# Patient Record
Sex: FEMALE | Race: OTHER | NOT HISPANIC OR LATINO | Employment: UNEMPLOYED | URBAN - METROPOLITAN AREA
[De-identification: names, ages, dates, MRNs, and addresses within clinical notes are randomized per-mention and may not be internally consistent; named-entity substitution may affect disease eponyms.]

---

## 2017-03-15 ENCOUNTER — TELEPHONE (OUTPATIENT)
Dept: MEDICAL GROUP | Age: 32
End: 2017-03-15

## 2017-03-16 NOTE — TELEPHONE ENCOUNTER
Called Milli Santizo and left a message to return my call regarding his/her upcoming NP appointment with Sandrine Pan M.D..   If patient returns the call please transfer them to extension: [phone]

## 2017-03-23 ENCOUNTER — OFFICE VISIT (OUTPATIENT)
Dept: MEDICAL GROUP | Age: 32
End: 2017-03-23
Payer: COMMERCIAL

## 2017-03-23 VITALS
HEIGHT: 61 IN | OXYGEN SATURATION: 96 % | DIASTOLIC BLOOD PRESSURE: 64 MMHG | HEART RATE: 78 BPM | BODY MASS INDEX: 27.68 KG/M2 | WEIGHT: 146.6 LBS | SYSTOLIC BLOOD PRESSURE: 110 MMHG | TEMPERATURE: 98.1 F

## 2017-03-23 DIAGNOSIS — Z13.220 SCREENING FOR LIPID DISORDERS: ICD-10-CM

## 2017-03-23 DIAGNOSIS — F52.5 PSYCHOLOGIC VAGINISMUS: ICD-10-CM

## 2017-03-23 DIAGNOSIS — Z30.09 FAMILY PLANNING: ICD-10-CM

## 2017-03-23 DIAGNOSIS — N92.6 IRREGULAR MENSTRUAL CYCLE: ICD-10-CM

## 2017-03-23 PROCEDURE — 99204 OFFICE O/P NEW MOD 45 MIN: CPT | Performed by: INTERNAL MEDICINE

## 2017-03-23 ASSESSMENT — PATIENT HEALTH QUESTIONNAIRE - PHQ9: CLINICAL INTERPRETATION OF PHQ2 SCORE: 0

## 2017-03-23 NOTE — MR AVS SNAPSHOT
"        Milli Santizo   3/23/2017 4:00 PM   Office Visit   MRN: [id number]    Department:  [address] [place] [address]   Dept Phone:  [phone]    Description:  Female : 1985   Provider:  Sandrine Pan M.D.           Reason for Visit     Establish Care     Dysmenorrhea missed period in February and returned on 3/7/17    Difficulty Sleeping not getting enough hours of sleep and experiencing daytime sleepiness    Other family planning      Allergies as of 3/23/2017     No Known Allergies      You were diagnosed with     Psychologic vaginismus   [069034]       Irregular menstrual cycle   [626.4.ICD-9-CM]       Family planning   [701791]       Screening for lipid disorders   [9674160]         Vital Signs     Blood Pressure Pulse Temperature Height Weight Body Mass Index    110/64 mmHg 78 36.7 °C (98.1 °F) 1.549 m (5' 0.98\") 66.497 kg (146 lb 9.6 oz) 27.71 kg/m2    Oxygen Saturation Last Menstrual Period Breastfeeding? Smoking Status          96% 2017 No Never Smoker         Basic Information     Date Of Birth Sex Race Ethnicity Preferred Language    1985 Female Other Non- English      Your appointments     May 01, 2017  2:30 PM   New Patient with Arvind Paredes M.D.   [facility])    [address]  [facility] [id number] [phone]              Problem List              ICD-10-CM Priority Class Noted - Resolved    Positive PPD R76.11   Unknown - Present    Allergic rhinitis    2013 - Present    Family planning Z30.09   2013 - Present    Psychologic vaginismus F52.5   3/23/2017 - Present    Irregular menstrual cycle N92.6   3/23/2017 - Present      Health Maintenance        Date Due Completion Dates    IMM DTaP/Tdap/Td Vaccine (1 - Tdap) 2006 ---    PAP SMEAR 2008 ---            Current Immunizations     HPV Quadrivalent Vaccine (GARDASIL) 2013  2:45 PM    Influenza TIV (IM) 10/21/2013, 2013   " Influenza Vaccine Quad Inj (Preserved) 10/18/2016    MMR Vaccine 12/13/2012, 11/13/2012  3:15 PM    Tuberculin Skin Test 4/1/2011      Below and/or attached are the medications your provider expects you to take. Review all of your home medications and newly ordered medications with your provider and/or pharmacist. Follow medication instructions as directed by your provider and/or pharmacist. Please keep your medication list with you and share with your provider. Update the information when medications are discontinued, doses are changed, or new medications (including over-the-counter products) are added; and carry medication information at all times in the event of emergency situations     Allergies:  No Known Allergies          Medications  Valid as of: March 23, 2017 -  5:46 PM    Generic Name Brand Name Tablet Size Instructions for use    .                 Medicines prescribed today were sent to:     [facility][id number] - [place], [place] - [address]    [address] [place] [address]    Phone: [phone] Fax: [phone]    Open 24 Hours?: No      Medication refill instructions:       If your prescription bottle indicates you have medication refills left, it is not necessary to call your provider’s office. Please contact your pharmacy and they will refill your medication.    If your prescription bottle indicates you do not have any refills left, you may request refills at any time through one of the following ways: The online [facility] system (except Urgent Care), by calling your provider’s office, or by asking your pharmacy to contact your provider’s office with a refill request. Medication refills are processed only during regular business hours and may not be available until the next business day. Your provider may request additional information or to have a follow-up visit with you prior to refilling your medication.   *Please Note: Medication refills are assigned a new Rx number when refilled  electronically. Your pharmacy may indicate that no refills were authorized even though a new prescription for the same medication is available at the pharmacy. Please request the medicine by name with the pharmacy before contacting your provider for a refill.        Your To Do List     Future Labs/Procedures Complete By Expires    CBC WITH DIFFERENTIAL  As directed 3/24/2018    COMP METABOLIC PANEL  As directed 3/24/2018    FREE THYROXINE  As directed 3/24/2018    LIPID PROFILE  As directed 3/24/2018    TSH  As directed 3/24/2018      Referral     A referral request has been sent to our patient care coordination department. Please allow 3-5 business days for us to process this request and contact you either by phone or mail. If you do not hear from us by the 5th business day, please call us at [phone].           [employer] Access Code: Activation code not generated  Current [employer] Status: Active

## 2017-03-23 NOTE — Clinical Note
[facility]  Sandrine Pan M.D.  [address]  [place] [address]  Fax: [phone]   Authorization for Release/Disclosure of   Protected Health Information   Name: AUNG SANTIZO : 1985 SSN: [id number]   [place] [place] [place] [address] Phone:    [phone] (home)    I authorize the entity listed below to release/disclose the PHI below to:   [facility], [facility]/Sandrine Pan M.D. and Sandrine Pan M.D.   Provider or Entity Name:  Dr. Patito Bustamante   Address   [facility], [facility], [facility]   Phone:      Fax:     Reason for request: continuity of care   Information to be released:    [  ] LAST COLONOSCOPY,  including any PATH REPORT and follow-up  [  ] LAST FIT/COLOGUARD RESULT [  ] LAST DEXA  [  ] LAST MAMMOGRAM  [  ] LAST PAP  [  ] LAST LABS [  ] RETINA EXAM REPORT  [  ] IMMUNIZATION RECORDS  [ XXXX ] Release all info      [  ] Check here and initial the line next to each item to release ALL health information INCLUDING  _____ Care and treatment for drug and / or alcohol abuse  _____ HIV testing, infection status, or AIDS  _____ Genetic Testing    DATES OF SERVICE OR TIME PERIOD TO BE DISCLOSED: _____________  I understand and acknowledge that:  * This Authorization may be revoked at any time by you in writing, except if your health information has already been used or disclosed.  * Your health information that will be used or disclosed as a result of you signing this authorization could be re-disclosed by the recipient. If this occurs, your re-disclosed health information may no longer be protected by State or Federal laws.  * You may refuse to sign this Authorization. Your refusal will not affect your ability to obtain treatment.  * This Authorization becomes effective upon signing and will  on (date) __________.      If no date is indicated, this Authorization will  one (1) year from the signature date.    Name: Aung Santizo    Signature:   Date:     3/23/2017       PLEASE FAX  REQUESTED RECORDS BACK TO: [phone]

## 2017-03-24 NOTE — PROGRESS NOTES
Milli Santizo is a 31 y.o. female here to establish care and the evaluation and management of:      HPI:    Family planning  Patient reports that she plans to have pregnant with her . She has never pregnant before. She is not taking any contraception. She has  with her  for 2 years. She reported that her  has diabetes. They do not have any genital sexual intercourse yet as she has vaginismus and her  was also sick. Now they are planning to have a baby. She also has pressure from parents and ask them to have a baby.  She is taking prenatal vitamins daily.    Irregular menstrual cycle  Patient reported that she always has irregular menstrual cycle. She will have her period once a month for 2 treatment and she would miss one month. Her menstrual cycle restart again after missing one month. She has blood test in September 2016 showed normal CBC, CMP, FSH, LH, except slightly low TSH, normal free T4, slightly elevated hematocrit and RBC count. She was told that she was dehydrated and she was advised to hydrate well. She reports that her mom has hypothyroidism and taking levothyroxine. She denied temperature intolerance, constipation, diarrhea, skin changes or health changes except severe cystic acne on her face that happened last year and was treated with topical and oral medication.      Psychologic vaginismus  Patient reported that she has vaginal spasms and pain during sexual intercourse and during pelvic exam and Pap smear. She tried to have pelvic exam and [facility]. She could not tolerate to insert speculum and did not do the Pap smear. She was able to tolerate pelvic exam with finger at that time. She has not had vaginal sexual intercourse with her  due to fear of pain from penetration.   She read and learned from online that first time sexual intercourse will be very painful and made her fearful, nervous and hesitated to have genital  sexual intercourse.     She denied history of traumatized sexual intercourse, or sexually transmitted infection or recurrent urinary tract infection. She denied discharge or lesion on the genitalia. She denies surgery on abdomen or pelvic.   She stated that she has good relationship with her . They both want to try to have a baby.      Current medicines (including changes today)  Current Outpatient Prescriptions   Medication Sig Dispense Refill   • Prenatal Multivit-Min-Fe-FA (PRENATAL VITAMINS PO) Take  by mouth.       No current facility-administered medications for this visit.     She  has a past medical history of Positive PPD.  She  has no past surgical history on file.  Social History   Substance Use Topics   • Smoking status: Never Smoker    • Smokeless tobacco: Never Used   • Alcohol Use: Yes      Comment: 1 drink every few months     Social History     Social History Narrative     Family History   Problem Relation Age of Onset   • Arthritis Mother    • Thyroid Mother      hypothyroid   • Hypertension Father    • Arthritis Maternal Grandmother    • Hypertension Paternal Grandmother      Family Status   Relation Status Death Age   • Mother Alive    • Father Alive    • Sister Alive    • Maternal Grandmother Alive    • Maternal Grandfather Alive    • Paternal Grandmother     • Paternal Grandfather       Health Maintenance Topics with due status: Overdue       Topic Date Due    IMM DTaP/Tdap/Td Vaccine 2006    PAP SMEAR 2008         ROS    Gen.: Denied weight change, appetite change, fatigue.  ENT: Denied sinus tenderness, nasal congestion, runny nose, or sore throat  CVS: Denied chest pain, palpitations, legs swelling.  Respiratory: Denied cough, shortness of breath, wheezing.  GI: Denied abdominal pain, constipation or diarrhea.  Endocrine: Denied temperature intolerance, increased frequency of urination, polyphagia or polydipsia.  Musculoskeletal: Denied back pain or joint  "pain.    All other systems reviewed and are negative     Objective:     Blood pressure 110/64, pulse 78, temperature 36.7 °C (98.1 °F), height 1.549 m (5' 0.98\"), weight 66.497 kg (146 lb 9.6 oz), last menstrual period 03/07/2017, SpO2 96 %, not currently breastfeeding. Body mass index is 27.71 kg/(m^2).  Physical Exam:    Constitutional: Well nourished and Well developed, Alert, no distress.  Skin: Warm, dry, good turgor, no rashes in visible areas.  Eye: Equal, round and reactive, conjunctiva clear, lids normal.  ENMT: Lips without lesions, good dentition, oropharynx clear.  Neck: Trachea midline, no masses, no thyromegaly. No cervical or supraclavicular lymphadenopathy.  Respiratory: Unlabored respiratory effort, lungs clear to auscultation, no wheezes, no ronchi.  Cardiovascular: Normal S1, S2, no murmur, no edema.   Abdomen: Soft, non distended, non-tender, no masses, no hepatosplenomegaly. Bowel sound normal.  Extremities: No edema, no clubbing, no cyanosis.  Psych: Alert and oriented x3, normal affect and mood.    I reviewed her previous blood test done in 9/2016 with her today. Lab results will be scanned into media.     Assessment and Plan:   The following treatment plan was discussed       1. Psychologic vaginismus  - I have a long discussion with patient as her condition is situational vaginismus most likely related to psychological stress and fear.  - I have reviewed with her for different methods to cope the stress and to overcome fear.   - Discussed and advised to use lubricant gel.   - Advised to practice to relax her pelvic muscle, vagina wall.   - Advised to work with her  to get more stronger relationship and trust each other. Encourage to have more activities with her .    - Advised to do regular physical exercise and enough rest and relaxation technique.   - Will refer to psychologist for counseling.   - REFERRAL TO PSYCHOLOGY  - [facility] WITH DIFFERENTIAL; Future  - COMP METABOLIC " PANEL; Future    2. Irregular menstrual cycle  - Will retest for thyroid function test as her last TSH was low in 9/2016. She also has family hx of hypothyroid in her mom.  - She already has an appointment with gynecologist on 5/1/17.  - CBC WITH DIFFERENTIAL; Future  - COMP METABOLIC PANEL; Future  - TSH; Future  - FREE THYROXINE; Future    3. Family planning  - Counseled to eat heathy diet and regular exercise to maintain healthy body weight she is overweight currently.   - Advised to have adequate folate supplement. She will continue to take prenatal vitamins.  - CBC WITH DIFFERENTIAL; Future  - COMP METABOLIC PANEL; Future    4. Screening for lipid disorders  - Advised to eat low fat, low carbohydrate and high fiber diet as well as do cardio physical exercise regularly.   - LIPID PROFILE; Future    Face-to-face time spent 50 minutes with patient and more than half of that time spent for counseling and cooperating of care for medical problems listed above.       Records requested.  Followup: Return in about 6 weeks (around 5/4/2017), or if symptoms worsen or fail to improve, for psychologic vaginismus, irregular menstrual period. Family planning. Lab review. sooner should new symptoms or problems arise.      Please note that this dictation was created using voice recognition software. I have made every reasonable attempt to correct obvious errors, but I expect that there may have unintended errors in text, spelling, punctuation, or grammar that I did not discover.

## 2017-03-24 NOTE — ASSESSMENT & PLAN NOTE
Patient reports that she plans to have pregnant with her . She has never pregnant before. She is not taking any contraception. She has  with her  for 2 years. She reported that her  has diabetes. They do not have any genital sexual intercourse yet as she has vaginismus and her  was also sick. Now they are planning to have a baby. She also has pressure from parents and ask them to have a baby.  She is taking prenatal vitamins daily.

## 2017-03-24 NOTE — ASSESSMENT & PLAN NOTE
Patient reported that she always has irregular menstrual cycle. She will have her period once a month for 2 treatment and she would miss one month. Her menstrual cycle restart again after missing one month. She has blood test in September 2016 showed normal CBC, CMP, FSH, LH, except slightly low TSH, normal free T4, slightly elevated hematocrit and RBC count. She was told that she was dehydrated and she was advised to hydrate well. She reports that her mom has hypothyroidism and taking levothyroxine. She denied temperature intolerance, constipation, diarrhea, skin changes or health changes except severe cystic acne on her face that happened last year and was treated with topical and oral medication.

## 2017-03-24 NOTE — ASSESSMENT & PLAN NOTE
Patient reported that she has vaginal spasms and pain during sexual intercourse and during pelvic exam and Pap smear. She tried to have pelvic exam and Pap smear in [facility] outreach clinic last year. She could not tolerate to insert speculum and did not do the Pap smear. She was able to tolerate pelvic exam with finger at that time. She has not had vaginal sexual intercourse with her  due to fear of pain from penetration.   She read and learned from online that first time sexual intercourse will be very painful and made her fearful, nervous and hesitated to have genital sexual intercourse.     She denied history of traumatized sexual intercourse, or sexually transmitted infection or recurrent urinary tract infection. She denied discharge or lesion on the genitalia. She denies surgery on abdomen or pelvic.   She stated that she has good relationship with her . They both want to try to have a baby.

## 2017-04-01 ENCOUNTER — HOSPITAL ENCOUNTER (OUTPATIENT)
Dept: LAB | Facility: MEDICAL CENTER | Age: 32
End: 2017-04-01
Attending: INTERNAL MEDICINE
Payer: COMMERCIAL

## 2017-04-01 DIAGNOSIS — N92.6 IRREGULAR MENSTRUAL CYCLE: ICD-10-CM

## 2017-04-01 DIAGNOSIS — Z13.220 SCREENING FOR LIPID DISORDERS: ICD-10-CM

## 2017-04-01 DIAGNOSIS — F52.5 PSYCHOLOGIC VAGINISMUS: ICD-10-CM

## 2017-04-01 DIAGNOSIS — Z30.09 FAMILY PLANNING: ICD-10-CM

## 2017-04-01 LAB
ALBUMIN SERPL BCP-MCNC: 4.3 G/DL (ref 3.2–4.9)
ALBUMIN/GLOB SERPL: 1.2 G/DL
ALP SERPL-CCNC: 55 U/L (ref 30–99)
ALT SERPL-CCNC: 24 U/L (ref 2–50)
ANION GAP SERPL CALC-SCNC: 8 MMOL/L (ref 0–11.9)
AST SERPL-CCNC: 24 U/L (ref 12–45)
BASOPHILS # BLD AUTO: 0.03 K/UL (ref 0–0.12)
BASOPHILS NFR BLD AUTO: 0.4 % (ref 0–1.8)
BILIRUB SERPL-MCNC: 0.3 MG/DL (ref 0.1–1.5)
BUN SERPL-MCNC: 12 MG/DL (ref 8–22)
CALCIUM SERPL-MCNC: 9.7 MG/DL (ref 8.5–10.5)
CHLORIDE SERPL-SCNC: 106 MMOL/L (ref 96–112)
CHOLEST SERPL-MCNC: 173 MG/DL (ref 100–199)
CO2 SERPL-SCNC: 24 MMOL/L (ref 20–33)
COMMENT 1642: NORMAL
CREAT SERPL-MCNC: 0.72 MG/DL (ref 0.5–1.4)
EOSINOPHIL # BLD: 0.16 K/UL (ref 0–0.51)
EOSINOPHIL NFR BLD AUTO: 2.1 % (ref 0–6.9)
ERYTHROCYTE [DISTWIDTH] IN BLOOD BY AUTOMATED COUNT: 41 FL (ref 35.9–50)
GLOBULIN SER CALC-MCNC: 3.7 G/DL (ref 1.9–3.5)
GLUCOSE SERPL-MCNC: 98 MG/DL (ref 65–99)
HCT VFR BLD AUTO: 48.5 % (ref 37–47)
HDLC SERPL-MCNC: 44 MG/DL
HGB BLD-MCNC: 14.1 G/DL (ref 12–16)
IMM GRANULOCYTES # BLD AUTO: 0.01 K/UL (ref 0–0.11)
IMM GRANULOCYTES NFR BLD AUTO: 0.1 % (ref 0–0.9)
LDLC SERPL CALC-MCNC: 109 MG/DL
LG PLATELETS BLD QL SMEAR: NORMAL
LYMPHOCYTES # BLD: 2.7 K/UL (ref 1–4.8)
LYMPHOCYTES NFR BLD AUTO: 36 % (ref 22–41)
MCH RBC QN AUTO: 25.1 PG (ref 27–33)
MCHC RBC AUTO-ENTMCNC: 29.1 G/DL (ref 33.6–35)
MCV RBC AUTO: 86.5 FL (ref 81.4–97.8)
MONOCYTES # BLD: 0.45 K/UL (ref 0–0.85)
MONOCYTES NFR BLD AUTO: 6 % (ref 0–13.4)
MORPHOLOGY BLD-IMP: NORMAL
NEUTROPHILS # BLD: 4.15 K/UL (ref 2–7.15)
NEUTROPHILS NFR BLD AUTO: 55.4 % (ref 44–72)
NRBC # BLD AUTO: 0 K/UL
NRBC BLD-RTO: 0 /100 WBC
PLATELET # BLD AUTO: 222 K/UL (ref 164–446)
PLATELET BLD QL SMEAR: NORMAL
PMV BLD AUTO: 15.1 FL (ref 9–12.9)
POTASSIUM SERPL-SCNC: 4.5 MMOL/L (ref 3.6–5.5)
PROT SERPL-MCNC: 8 G/DL (ref 6–8.2)
RBC # BLD AUTO: 5.61 M/UL (ref 4.2–5.4)
RBC BLD AUTO: PRESENT
SODIUM SERPL-SCNC: 138 MMOL/L (ref 135–145)
T4 FREE SERPL-MCNC: 0.86 NG/DL (ref 0.53–1.43)
TRIGL SERPL-MCNC: 100 MG/DL (ref 0–149)
TSH SERPL DL<=0.005 MIU/L-ACNC: 3.8 UIU/ML (ref 0.3–3.7)
WBC # BLD AUTO: 7.5 K/UL (ref 4.8–10.8)

## 2017-04-01 PROCEDURE — 84443 ASSAY THYROID STIM HORMONE: CPT

## 2017-04-01 PROCEDURE — 85025 COMPLETE CBC W/AUTO DIFF WBC: CPT

## 2017-04-01 PROCEDURE — 84439 ASSAY OF FREE THYROXINE: CPT

## 2017-04-01 PROCEDURE — 80061 LIPID PANEL: CPT

## 2017-04-01 PROCEDURE — 80053 COMPREHEN METABOLIC PANEL: CPT

## 2017-04-01 PROCEDURE — 36415 COLL VENOUS BLD VENIPUNCTURE: CPT

## 2017-04-02 NOTE — PROGRESS NOTES
Quick Note:    Please contact patient and advise her to schedule an appointment to review lab results.    Sandrine Pan MD    ______

## 2017-04-03 ENCOUNTER — TELEPHONE (OUTPATIENT)
Dept: MEDICAL GROUP | Age: 32
End: 2017-04-03

## 2017-04-03 NOTE — TELEPHONE ENCOUNTER
Phone Number Called: [phone] (home)     Message: Called and LVM for Pt to call office and set up an appointment to go over her recent lab results    Left Message for patient to call back: yes

## 2017-04-03 NOTE — TELEPHONE ENCOUNTER
----- Message from Sandrine Pan M.D. sent at 4/2/2017 11:40 AM PDT -----  Please contact patient and advise her to schedule an appointment to review lab results.    Sandrine Pan MD

## 2017-04-05 ENCOUNTER — OFFICE VISIT (OUTPATIENT)
Dept: MEDICAL GROUP | Age: 32
End: 2017-04-05
Payer: COMMERCIAL

## 2017-04-05 VITALS
RESPIRATION RATE: 16 BRPM | TEMPERATURE: 97.9 F | BODY MASS INDEX: 28.66 KG/M2 | DIASTOLIC BLOOD PRESSURE: 70 MMHG | HEART RATE: 80 BPM | OXYGEN SATURATION: 99 % | HEIGHT: 60 IN | SYSTOLIC BLOOD PRESSURE: 106 MMHG | WEIGHT: 146 LBS

## 2017-04-05 DIAGNOSIS — L70.0 ACNE VULGARIS: ICD-10-CM

## 2017-04-05 DIAGNOSIS — F52.5 PSYCHOLOGIC VAGINISMUS: ICD-10-CM

## 2017-04-05 DIAGNOSIS — R79.89 ELEVATED TSH: ICD-10-CM

## 2017-04-05 PROCEDURE — 99214 OFFICE O/P EST MOD 30 MIN: CPT | Performed by: INTERNAL MEDICINE

## 2017-04-05 RX ORDER — BENZOYL PEROXIDE 10 G/100G
1 SUSPENSION TOPICAL 2 TIMES DAILY
Qty: 1 BOTTLE | Refills: 3 | Status: SHIPPED | OUTPATIENT
Start: 2017-04-05 | End: 2018-04-26

## 2017-04-05 ASSESSMENT — PAIN SCALES - GENERAL: PAINLEVEL: NO PAIN

## 2017-04-06 NOTE — PROGRESS NOTES
Subjective:   Aung Blanchard is a 31 y.o. female here today for evaluation and management of:      Acne vulgaris  Patient reported that she has intermittent acne flared up on her face, mostly on her jaw line. She is using benzoyl peroxide 10% liquid to control her acne. She denies side effects from using it. She also used moisturizing lotion to prevent dryness of her skin. She wants to refill benzoyl peroxide liquid today.    Elevated TSH  Patient has family history of hypothyroid on her mom. She had slightly low TSH and normal free T4 in previous blood work in September 2016. Recent blood tests showed that she has slightly elevated TSH with normal free T4. She reported feeling tired and irregular menstrual cycle. We discussed to repeat the blood test 3 months later before decided taking levothyroxine as her thyroid hormone is not significantly low. She agreed with the plan.      Results for ANUG BLANCHARD (MRN [id number]) as of 4/5/2017 19:17   Ref. Range 4/1/2017 08:25   TSH Latest Ref Range: 0.300-3.700 uIU/mL 3.800 (H)   Free T-4 Latest Ref Range: 0.53-1.43 ng/dL 0.86       Psychologic vaginismus  She has an appointment with gynecologist and psychologist. She will follow the instruction that we discussed in previous visit on 3/23/17. Please see detailed discussion in office visit note on 3/23/17. Patient will discuss with gynecologist for irregular menstrual cycle, plan to conceive and will do Pap smear with gynecologist. She is advised to take prenatal vitamins daily.           Current medicines (including changes today)  Current Outpatient Prescriptions   Medication Sig Dispense Refill   • Benzoyl Peroxide 10 % Liquid 1 Application by Apply externally route 2 times a day. 1 Bottle 3   • Prenatal Multivit-Min-Fe-FA (PRENATAL VITAMINS PO) Take  by mouth.       No current facility-administered medications for this visit.     She  has a past medical history of Positive PPD.    ROS   No chest pain, no shortness  of breath, no abdominal pain       Objective:     Blood pressure 106/70, pulse 80, temperature 36.6 °C (97.9 °F), resp. rate 16, height 1.524 m (5'), weight 66.225 kg (146 lb), last menstrual period 03/07/2017, SpO2 99 %, not currently breastfeeding. Body mass index is 28.51 kg/(m^2).   Physical Exam:  General: Alert, oriented and no acute distress.  Eye contact is good, speech goal directed, affect calm  HEENT: conjunctiva non-injected, sclera non-icteric.  Oral mucous membranes pink and moist with no lesions.  Pinna normal.   Neck No supraclavicular, submandibular, submental lymphadenopathy or masses in the neck or supraclavicular regions.  Lungs: Normal respiratory effort, clear to auscultation bilaterally with good excursion.  CV: regular rate and rhythm. No murmurs.   Abdomen: soft, non distended, nontender, Bowel sound normal.  Ext: no edema, color normal, vascularity normal, temperature normal        Assessment and Plan:   The following treatment plan was discussed     1. Elevated TSH  - Discussed to recheck thyroid function tests in 3 months. If her TSH remains high, will consider to start Levothyroxine.  - COMP METABOLIC PANEL; Future  - CBC WITH DIFFERENTIAL; Future  - TSH; Future  - FREE THYROXINE; Future    2. Acne vulgaris  - Recommend to avoid sweet and dairy. Refill benzoyl peroxide. Discussed the use and side effects of benzoyl peroxide with patient.  - Benzoyl Peroxide 10 % Liquid; 1 Application by Apply externally route 2 times a day.  Dispense: 1 Bottle; Refill: 3  - COMP METABOLIC PANEL; Future  - CBC WITH DIFFERENTIAL; Future    3. Psychologic vaginismus  - She will continue to try relaxation techniques, KY gel and more interaction with her .  - She will follow-up with gynecologist and psychologist.  - Advised her to do Pap smear with gynecologist.      She has slightly elevated globulin at 3.7 and slightly elevated hematocrit at 48.5, and recent blood test on 4/1/17. I discussed with  patient and  for possible causes and I recommended her to increase water intake and to lose weight. She is advised to eat low fat, low carbohydrate and high fiber diet as well as do cardio physical exercise regularly. We will recheck lab in 3 months.     Followup: Return in about 3 months (around 7/5/2017), or if symptoms worsen or fail to improve, for elevated TSH, acne, psychologic vaginismus, lab review.      Please note that this dictation was created using voice recognition software. I have made every reasonable attempt to correct obvious errors, but I expect that there may have unintended errors in text, spelling, punctuation, or grammar that I did not discover.

## 2017-04-06 NOTE — ASSESSMENT & PLAN NOTE
She has an appointment with gynecologist and psychologist. She will follow the instruction that we discussed in previous visit on 3/23/17. Please see detailed discussion in office visit note on 3/23/17. Patient will discuss with gynecologist for irregular menstrual cycle, plan to conceive and will do Pap smear with gynecologist. She is advised to take prenatal vitamins daily.

## 2017-04-06 NOTE — ASSESSMENT & PLAN NOTE
Patient has family history of hypothyroid on her mom. She had slightly low TSH and normal free T4 in previous blood work in September 2016. Recent blood tests showed that she has slightly elevated TSH with normal free T4. She reported feeling tired and irregular menstrual cycle. We discussed to repeat the blood test 3 months later before decided taking levothyroxine as her thyroid hormone is not significantly low. She agreed with the plan.      Results for AUNG BLANCHARD (MRN [id number]) as of 4/5/2017 19:17   Ref. Range 4/1/2017 08:25   TSH Latest Ref Range: 0.300-3.700 uIU/mL 3.800 (H)   Free T-4 Latest Ref Range: 0.53-1.43 ng/dL 0.86

## 2017-04-06 NOTE — ASSESSMENT & PLAN NOTE
Patient reported that she has intermittent acne flared up on her face, mostly on her jaw line. She is using benzoyl peroxide 10% liquid to control her acne. She denies side effects from using it. She also used moisturizing lotion to prevent dryness of her skin. She wants to refill benzoyl peroxide liquid today.

## 2017-05-02 ENCOUNTER — GYNECOLOGY VISIT (OUTPATIENT)
Dept: OBGYN | Facility: MEDICAL CENTER | Age: 32
End: 2017-05-02
Payer: COMMERCIAL

## 2017-05-02 VITALS
WEIGHT: 144 LBS | HEIGHT: 60 IN | SYSTOLIC BLOOD PRESSURE: 108 MMHG | BODY MASS INDEX: 28.27 KG/M2 | DIASTOLIC BLOOD PRESSURE: 80 MMHG

## 2017-05-02 DIAGNOSIS — Z31.69 PRE-CONCEPTION COUNSELING: ICD-10-CM

## 2017-05-02 PROCEDURE — 99204 OFFICE O/P NEW MOD 45 MIN: CPT | Performed by: OBSTETRICS & GYNECOLOGY

## 2017-05-02 ASSESSMENT — ENCOUNTER SYMPTOMS
ABDOMINAL PAIN: 0
SEIZURES: 0
NECK PAIN: 0
NERVOUS/ANXIOUS: 0
CONSTIPATION: 0
FOCAL WEAKNESS: 0
SPUTUM PRODUCTION: 0
SPEECH CHANGE: 0
PALPITATIONS: 0
DEPRESSION: 0
BLURRED VISION: 0
HALLUCINATIONS: 0
BLOOD IN STOOL: 0
DOUBLE VISION: 0
PHOTOPHOBIA: 0
MYALGIAS: 0
CONSTITUTIONAL NEGATIVE: 1
SHORTNESS OF BREATH: 0
SENSORY CHANGE: 0
DIARRHEA: 0

## 2017-05-02 ASSESSMENT — LIFESTYLE VARIABLES: SUBSTANCE_ABUSE: 0

## 2017-05-02 NOTE — PROGRESS NOTES
Subjective:      Milli Santizo is a 31 y.o. female who presents with Other    H/o irregular cycles with severe acne in -  was on BC pills from [facility] later cycles got regulated since  stopped BC pills and trying to get pregnant.   cycles are regular.  Patient's last menstrual period was 2017.\   for 2 years.     deneis any abnormal pap or STD.   denies HTn diabetes   had TSh borderline high.   feels tiresme.   denies smoking  Social alcohol use.   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  Denies Family h/o diabetes, breast cancer, ovarian ,uterine or colon cancer.   no prior surgeries.           Other  Pertinent negatives include no abdominal pain, chest pain, congestion, myalgias or neck pain.     See above.    Review of Systems   Constitutional: Negative.    HENT: Negative for congestion and nosebleeds.    Eyes: Negative for blurred vision, double vision and photophobia.   Respiratory: Negative for sputum production and shortness of breath.    Cardiovascular: Negative for chest pain and palpitations.   Gastrointestinal: Negative for abdominal pain, diarrhea, constipation and blood in stool.   Genitourinary: Negative for dysuria and urgency.   Musculoskeletal: Negative for myalgias and neck pain.   Skin: Negative.    Neurological: Negative for sensory change, speech change, focal weakness and seizures.   Psychiatric/Behavioral: Negative for depression, suicidal ideas, hallucinations and substance abuse. The patient is not nervous/anxious.           Objective:     /80 mmHg  Ht 1.524 m (5')  Wt 65.318 kg (144 lb)  BMI 28.12 kg/m2  LMP 2017     Physical Exam   Constitutional: She is oriented to person, place, and time. She appears well-developed and well-nourished.   HENT:   Head: Normocephalic and atraumatic.   Eyes: Conjunctivae and EOM are normal. Pupils are equal, round, and reactive to light.   Neck: Normal range of motion. Neck supple. No thyromegaly present.    Cardiovascular: Normal rate, regular rhythm and normal heart sounds.    Pulmonary/Chest: Effort normal and breath sounds normal.   Abdominal: Soft. Bowel sounds are normal.   Genitourinary: Vagina normal and uterus normal.   Finger exam followed by speculum exam, reassured pt the anatomy is normal.   gave instructions of positions during sex.   Musculoskeletal: Normal range of motion.   Neurological: She is alert and oriented to person, place, and time. She has normal reflexes.   Skin: Skin is warm and dry.   Psychiatric: She has a normal mood and affect. Her behavior is normal. Judgment and thought content normal.   Nursing note and vitals reviewed.              Assessment/Plan:   32 y/o    regular cycles.   trying tog et pregnant.   talked about fertile period.

## 2017-05-02 NOTE — MR AVS SNAPSHOT
Milli Santizo   2017 10:30 AM   Gynecology Visit   MRN: [id number]    Department:  [facility]   Dept Phone:  [phone]    Description:  Female : 1985   Provider:  Arvind Paredes M.D.           Reason for Visit     Other family planning trying to concive      Allergies as of 2017     No Known Allergies      Vital Signs     Blood Pressure Height Weight Body Mass Index Last Menstrual Period Smoking Status    108/80 mmHg 1.524 m (5') 65.318 kg (144 lb) 28.12 kg/m2 2017 Never Smoker       Basic Information     Date Of Birth Sex Race Ethnicity Preferred Language    1985 Female Other Non- English      Your appointments     2017  9:00 AM   Initial Behavioral Health Eval with Meaghan Hernandez P.H.D.   BEHAVIORAL HEALTH [facility] ([facility])    [address] [place] [address]  [address]  [facility] [address]   [phone]            2017  3:20 PM   Established Patient with Sandrine Pan M.D.   [address] [id number] [phone]           You will be receiving a confirmation call a few days before your appointment from our automated call confirmation system.              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   3/23/2017 - Present    Irregular menstrual cycle N92.6   3/23/2017 - Present    Elevated TSH R94.6   2017 - Present    Acne vulgaris L70.0   2017 - Present      Health Maintenance        Date Due Completion Dates    IMM DTaP/Tdap/Td Vaccine (1 - Tdap) 2006 ---    PAP SMEAR 2008 ---            Current Immunizations     HPV Quadrivalent Vaccine (GARDASIL) 2013  2:45 PM    Influenza TIV (IM) 10/21/2013, 2013    Influenza Vaccine Quad Inj (Preserved) 10/18/2016    MMR Vaccine 2012, 2012  3:15 PM    Tuberculin Skin  Test 4/1/2011      Below and/or attached are the medications your provider expects you to take. Review all of your home medications and newly ordered medications with your provider and/or pharmacist. Follow medication instructions as directed by your provider and/or pharmacist. Please keep your medication list with you and share with your provider. Update the information when medications are discontinued, doses are changed, or new medications (including over-the-counter products) are added; and carry medication information at all times in the event of emergency situations     Allergies:  No Known Allergies          Medications  Valid as of: May 02, 2017 - 11:04 AM    Generic Name Brand Name Tablet Size Instructions for use    Benzoyl Peroxide (Liquid) Benzoyl Peroxide 10 % 1 Application by Apply externally route 2 times a day.        Prenatal Multivit-Min-Fe-FA   Take  by mouth.        .                 Medicines prescribed today were sent to:     [facility] ([place] [place] - [address]    [facility] [place] [address]    Phone: [phone] Fax: [phone]    Open 24 Hours?: No      Medication refill instructions:       If your prescription bottle indicates you have medication refills left, it is not necessary to call your provider’s office. Please contact your pharmacy and they will refill your medication.    If your prescription bottle indicates you do not have any refills left, you may request refills at any time through one of the following ways: The online [employer] system (except Urgent Care), by calling your provider’s office, or by asking your pharmacy to contact your provider’s office with a refill request. Medication refills are processed only during regular business hours and may not be available until the next business day. Your provider may request additional information or to have a follow-up visit with you prior to refilling your medication.   *Please Note: Medication refills  are assigned a new Rx number when refilled electronically. Your pharmacy may indicate that no refills were authorized even though a new prescription for the same medication is available at the pharmacy. Please request the medicine by name with the pharmacy before contacting your provider for a refill.           [employer] Access Code: Activation code not generated  Current [employer] [facility] Status: Active

## 2017-06-08 ENCOUNTER — OFFICE VISIT (OUTPATIENT)
Dept: BEHAVIORAL HEALTH | Facility: PHYSICIAN GROUP | Age: 32
End: 2017-06-08
Payer: COMMERCIAL

## 2017-06-08 DIAGNOSIS — F41.1 GENERALIZED ANXIETY DISORDER: ICD-10-CM

## 2017-06-08 PROCEDURE — 90791 PSYCH DIAGNOSTIC EVALUATION: CPT | Performed by: PSYCHOLOGIST
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
## 2017-06-14 ENCOUNTER — OFFICE VISIT (OUTPATIENT)
Dept: MEDICAL GROUP | Age: 32
End: 2017-06-14
Payer: COMMERCIAL

## 2017-06-14 VITALS
DIASTOLIC BLOOD PRESSURE: 78 MMHG | HEIGHT: 60 IN | BODY MASS INDEX: 28.66 KG/M2 | SYSTOLIC BLOOD PRESSURE: 110 MMHG | TEMPERATURE: 97.3 F | WEIGHT: 146 LBS | HEART RATE: 84 BPM | OXYGEN SATURATION: 97 %

## 2017-06-14 DIAGNOSIS — G57.11 MERALGIA PARESTHETICA OF RIGHT SIDE: ICD-10-CM

## 2017-06-14 PROBLEM — F41.1 GENERALIZED ANXIETY DISORDER: Status: ACTIVE | Noted: 2017-06-14

## 2017-06-14 PROCEDURE — 99214 OFFICE O/P EST MOD 30 MIN: CPT | Performed by: INTERNAL MEDICINE

## 2017-06-14 RX ORDER — IBUPROFEN 600 MG/1
600 TABLET ORAL EVERY 6 HOURS PRN
COMMUNITY
End: 2017-07-25

## 2017-06-14 ASSESSMENT — PAIN SCALES - GENERAL: PAINLEVEL: 7=MODERATE-SEVERE PAIN

## 2017-06-14 NOTE — PATIENT INSTRUCTIONS
Meralgia Paresthetica   Meralgia paresthetica (MP) is a disorder characterized by tingling, numbness, and burning pain in the outer side of the thigh. It occurs in men more than women. MP is generally found in middle-aged or overweight people. Sometimes, the disorder may disappear.  CAUSES  The disorder is caused by a nerve in the thigh being squeezed (compressed). MP may be associated with tight clothing, pregnancy, diabetes, and being overweight (obese).  SYMPTOMS  · Tingling, numbness, and burning in the outer thigh.   · An area of the skin may be painful and sensitive to the touch.   The symptoms often worsen after walking or standing.  TREATMENT   Treatment is based on your symptoms and is mainly supportive. Treatment may include:  · Wearing looser clothing.   · Losing weight.   · Avoiding prolonged standing or walking.   · Taking medication.   · Surgery if the pain is peristent or severe.   MP usually eases or disappears after treatment. Surgery is not always fully successful.  Document Released: 12/08/2003 Document Revised: 03/11/2013 Document Reviewed: 12/18/2006  [employer]® Patient Information ©2013 [employer].

## 2017-06-14 NOTE — ASSESSMENT & PLAN NOTE
This is a new problem. Patient reported feeling constant, stabbing, sharp pain on upper outer quadrant of the right thigh for 7 days. She has similar symptom a year ago. She does not have any acute injury. She does not have history of back pain, or back injury. She stated that her pain was very intense and severe, 8/10 in severity and she was not able to stand straight and walk a few days ago. She took ibuprofen 600 mg last night and pain gradually decreased. However, she still has mild to moderate sharp pain on upper outer part of the right thigh. 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 She does not have history of diabetes or any neurological disease. Denies family history of neurological disease.

## 2017-06-14 NOTE — PROGRESS NOTES
Subjective:   Milli Santizo is a 31 y.o. female here today for evaluation and management of:      Meralgia paresthetica of right side  This is a new problem. Patient reported feeling constant, stabbing, sharp pain on upper outer quadrant of the right thigh for 7 days. She has similar symptom a year ago. She does not have any acute injury. She does not have history of back pain, or back injury. She stated that her pain was very intense and severe, 8/10 in severity and she was not able to stand straight and walk a few days ago. She took ibuprofen 600 mg last night and pain gradually decreased. However, she still has mild to moderate sharp pain on upper outer part of the right thigh. 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 She does not have history of diabetes or any neurological disease. Denies family history of neurological disease.         Current medicines (including changes today)  Current Outpatient Prescriptions   Medication Sig Dispense Refill   • Diclofenac Sodium 1 % Gel Apply 4 gram on affected thigh twice a day as needed. 1 Tube 0   • ibuprofen (MOTRIN) 600 MG Tab Take 600 mg by mouth every 6 hours as needed.     • Benzoyl Peroxide 10 % Liquid 1 Application by Apply externally route 2 times a day. 1 Bottle 3   • Prenatal Multivit-Min-Fe-FA (PRENATAL VITAMINS PO) Take  by mouth.       No current facility-administered medications for this visit.     She  has a past medical history of Positive PPD and Anxiety.    ROS   No chest pain, no shortness of breath, no abdominal pain       Objective:     Blood pressure 110/78, pulse 84, temperature 36.3 °C (97.3 °F), height 1.524 m (5'), weight 66.225 kg (146 lb), last menstrual period 05/19/2017, SpO2 97 %, not currently breastfeeding. Body mass index is 28.51 kg/(m^2).   Physical Exam:  General: Alert, oriented and no acute distress.  Eye contact is good, speech goal directed, affect calm  HEENT: conjunctiva  non-injected, sclera non-icteric.  Oral mucous membranes pink and moist with no lesions.  Pinna normal.   Lungs: Normal respiratory effort, clear to auscultation bilaterally with good excursion.  CV: regular rate and rhythm. No murmurs.   Abdomen: soft, non distended, nontender, Bowel sound normal.  Ext: no edema, color normal, vascularity normal, temperature normal  Musculoskeletal exam: Nontender and no swelling on hips, knees exam. Nontender on lumbar spine exam. Negative straight leg raising test. No deformity noticed on the both lower extremities.  Neuro exam: Intact fine touch on both lower extremity. Normal muscle tone and muscle strength on both lower extremity. Normal gait.      Assessment and Plan:   The following treatment plan was discussed     1. Meralgia paresthetica of right side  - I have a long discussion with patient and her  about nature of the disease explained to them that it is localized inflammation on lateral femoral cutaneous nerve. This is usually benign in nature and sometimes disappeared by taking anti-inflammatory medication and avoid wearing tight pants.  - Patient wants to try ibuprofen only. She does not want to take gabapentin yet.  - She is advised to take ibuprofen 600 mg every 8 hour with meal for 5 days and prescribed diclofenac gel to apply twice a day on affected area.  - Recommend to avoid prolonged sitting, prolonged standing or prolonged walking. Avoid wearing tight clothes.   - If symptoms do not improve in 4-6 weeks, consider to try gabapentin or oral steroid.  - Diclofenac Sodium 1 % Gel; Apply 4 gram on affected thigh twice a day as needed.  Dispense: 1 Tube; Refill: 0  - ibuprofen (MOTRIN) 600 MG Tab; Take 600 mg by mouth every 6 hours as needed.        Followup: Return if symptoms worsen or fail to improve. Patient has regular follow-up appointment on 7/14/17 with blood tests. She will keep the follow-up appointment.      Please note that this dictation was  created using voice recognition software. I have made every reasonable attempt to correct obvious errors, but I expect that there may have unintended errors in text, spelling, punctuation, or grammar that I did not discover.

## 2017-07-01 ENCOUNTER — HOSPITAL ENCOUNTER (OUTPATIENT)
Dept: LAB | Facility: MEDICAL CENTER | Age: 32
End: 2017-07-01
Attending: INTERNAL MEDICINE
Payer: COMMERCIAL

## 2017-07-01 DIAGNOSIS — L70.0 ACNE VULGARIS: ICD-10-CM

## 2017-07-01 DIAGNOSIS — R79.89 ELEVATED TSH: ICD-10-CM

## 2017-07-01 LAB
ALBUMIN SERPL BCP-MCNC: 3.8 G/DL (ref 3.2–4.9)
ALBUMIN/GLOB SERPL: 1.1 G/DL
ALP SERPL-CCNC: 66 U/L (ref 30–99)
ALT SERPL-CCNC: 18 U/L (ref 2–50)
ANION GAP SERPL CALC-SCNC: 7 MMOL/L (ref 0–11.9)
AST SERPL-CCNC: 24 U/L (ref 12–45)
BASOPHILS # BLD AUTO: 0.5 % (ref 0–1.8)
BASOPHILS # BLD: 0.04 K/UL (ref 0–0.12)
BILIRUB SERPL-MCNC: 0.3 MG/DL (ref 0.1–1.5)
BUN SERPL-MCNC: 6 MG/DL (ref 8–22)
CALCIUM SERPL-MCNC: 8.7 MG/DL (ref 8.5–10.5)
CHLORIDE SERPL-SCNC: 109 MMOL/L (ref 96–112)
CO2 SERPL-SCNC: 23 MMOL/L (ref 20–33)
CREAT SERPL-MCNC: 0.6 MG/DL (ref 0.5–1.4)
EOSINOPHIL # BLD AUTO: 0.24 K/UL (ref 0–0.51)
EOSINOPHIL NFR BLD: 2.8 % (ref 0–6.9)
ERYTHROCYTE [DISTWIDTH] IN BLOOD BY AUTOMATED COUNT: 38.8 FL (ref 35.9–50)
GFR SERPL CREATININE-BSD FRML MDRD: >60 ML/MIN/1.73 M 2
GLOBULIN SER CALC-MCNC: 3.4 G/DL (ref 1.9–3.5)
GLUCOSE SERPL-MCNC: 80 MG/DL (ref 65–99)
HCT VFR BLD AUTO: 46 % (ref 37–47)
HGB BLD-MCNC: 14.6 G/DL (ref 12–16)
IMM GRANULOCYTES # BLD AUTO: 0.02 K/UL (ref 0–0.11)
IMM GRANULOCYTES NFR BLD AUTO: 0.2 % (ref 0–0.9)
LYMPHOCYTES # BLD AUTO: 2.88 K/UL (ref 1–4.8)
LYMPHOCYTES NFR BLD: 33.9 % (ref 22–41)
MCH RBC QN AUTO: 26.6 PG (ref 27–33)
MCHC RBC AUTO-ENTMCNC: 31.7 G/DL (ref 33.6–35)
MCV RBC AUTO: 83.9 FL (ref 81.4–97.8)
MONOCYTES # BLD AUTO: 0.49 K/UL (ref 0–0.85)
MONOCYTES NFR BLD AUTO: 5.8 % (ref 0–13.4)
NEUTROPHILS # BLD AUTO: 4.83 K/UL (ref 2–7.15)
NEUTROPHILS NFR BLD: 56.8 % (ref 44–72)
NRBC # BLD AUTO: 0 K/UL
NRBC BLD AUTO-RTO: 0 /100 WBC
PLATELET # BLD AUTO: 219 K/UL (ref 164–446)
PMV BLD AUTO: 14.1 FL (ref 9–12.9)
POTASSIUM SERPL-SCNC: 4.1 MMOL/L (ref 3.6–5.5)
PROT SERPL-MCNC: 7.2 G/DL (ref 6–8.2)
RBC # BLD AUTO: 5.48 M/UL (ref 4.2–5.4)
SODIUM SERPL-SCNC: 139 MMOL/L (ref 135–145)
T4 FREE SERPL-MCNC: 0.77 NG/DL (ref 0.53–1.43)
TSH SERPL DL<=0.005 MIU/L-ACNC: 5.74 UIU/ML (ref 0.3–3.7)
WBC # BLD AUTO: 8.5 K/UL (ref 4.8–10.8)

## 2017-07-01 PROCEDURE — 85025 COMPLETE CBC W/AUTO DIFF WBC: CPT

## 2017-07-01 PROCEDURE — 80053 COMPREHEN METABOLIC PANEL: CPT

## 2017-07-01 PROCEDURE — 84439 ASSAY OF FREE THYROXINE: CPT

## 2017-07-01 PROCEDURE — 84443 ASSAY THYROID STIM HORMONE: CPT

## 2017-07-01 PROCEDURE — 36415 COLL VENOUS BLD VENIPUNCTURE: CPT

## 2017-07-14 ENCOUNTER — APPOINTMENT (OUTPATIENT)
Dept: MEDICAL GROUP | Age: 32
End: 2017-07-14
Payer: COMMERCIAL

## 2017-07-25 ENCOUNTER — OFFICE VISIT (OUTPATIENT)
Dept: MEDICAL GROUP | Age: 32
End: 2017-07-25
Payer: COMMERCIAL

## 2017-07-25 VITALS
BODY MASS INDEX: 28.47 KG/M2 | SYSTOLIC BLOOD PRESSURE: 110 MMHG | HEART RATE: 88 BPM | DIASTOLIC BLOOD PRESSURE: 74 MMHG | HEIGHT: 60 IN | TEMPERATURE: 97.7 F | OXYGEN SATURATION: 97 % | WEIGHT: 145 LBS

## 2017-07-25 DIAGNOSIS — Z30.09 FAMILY PLANNING: ICD-10-CM

## 2017-07-25 DIAGNOSIS — E03.9 HYPOTHYROIDISM (ACQUIRED): ICD-10-CM

## 2017-07-25 DIAGNOSIS — F52.5 PSYCHOLOGIC VAGINISMUS: ICD-10-CM

## 2017-07-25 PROCEDURE — 99215 OFFICE O/P EST HI 40 MIN: CPT | Performed by: INTERNAL MEDICINE

## 2017-07-25 RX ORDER — LEVOTHYROXINE SODIUM 0.03 MG/1
25 TABLET ORAL
Qty: 30 TAB | Refills: 6 | Status: SHIPPED | OUTPATIENT
Start: 2017-07-25 | End: 2018-02-06 | Stop reason: SDUPTHER

## 2017-07-25 NOTE — MR AVS SNAPSHOT
Milli Valladaresew   2017 5:40 PM   Office Visit   MRN: [id number]    Department:  [address] [place] [address]   Dept Phone:  [phone]    Description:  Female : 1985   Provider:  Sandrine Pan M.D.           Reason for Visit     Hypothyroidism Pt also experiencing mood swings since the beginning of the month    Results Blood work done on 17      Allergies as of 2017     No Known Allergies      You were diagnosed with     Hypothyroidism (acquired)   [363373]         Vital Signs     Blood Pressure Pulse Temperature Height Weight Body Mass Index    110/74 mmHg 88 36.5 °C (97.7 °F) 1.524 m (5') 65.772 kg (145 lb) 28.32 kg/m2    Oxygen Saturation Last Menstrual Period Breastfeeding? Smoking Status          97% 2017 No Never Smoker         Basic Information     Date Of Birth Sex Race Ethnicity Preferred Language    1985 Female Other Non- English      Your appointments     Aug 24, 2017  2:00 PM   Individual Therapy with Meaghan Hernandez P.H.D.   BE[facility]IORAL [facility] [address] [place] [address] [facility]    [address] [place] [address]  [address]  [facility] [address]   [phone]            Aug 25, 2017  3:00 PM   GYN Visit with Lavell Bullard M.D.   [facility] ([address], [address]  [facility] [address]   [phone]            Sep 06, 2017  4:00 PM   Individual Therapy with Meaghan Hernandez P.H.D.   BE[facility]AVIOR[facility]    [address] [facility] [address]  [address]  [facility] [id number] [phone]            Sep 21, 2017  3:00 PM   Individual Therapy with CLARY LeonardoHDILAN   BEHAVIORAL HEALTH [address] [place] [address] [facility]    [address] [facility]  [address]  [facility] [address]   [phone]            Oct 12, 2017  5:20 PM   Established Patient with Sandrine Pan M.D.   [address]    [address] [facility] [id number] [phone]           You will be receiving a confirmation call a few days before your appointment from our automated  call confirmation system.              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   3/23/2017 - Present    Irregular menstrual cycle N92.6   3/23/2017 - Present    Hypothyroidism (acquired) E03.9   4/5/2017 - Present    Acne vulgaris L70.0   4/5/2017 - Present    Meralgia paresthetica of right side G57.11   6/14/2017 - Present    Generalized anxiety disorder F41.1   6/14/2017 - Present      Health Maintenance        Date Due Completion Dates    IMM DTaP/Tdap/Td Vaccine (1 - Tdap) 6/27/2006 ---    PAP SMEAR 6/27/2008 ---    IMM INFLUENZA (1) 9/1/2017 10/18/2016, 10/21/2013, 1/25/2013            Current Immunizations     HPV Quadrivalent Vaccine (GARDASIL) 11/20/2013  2:45 PM    Influenza TIV (IM) 10/21/2013, 1/25/2013    Influenza Vaccine Quad Inj (Preserved) 10/18/2016    MMR Vaccine 12/13/2012, 11/13/2012  3:15 PM    Tuberculin Skin Test 4/1/2011      Below and/or attached are the medications your provider expects you to take. Review all of your home medications and newly ordered medications with your provider and/or pharmacist. Follow medication instructions as directed by your provider and/or pharmacist. Please keep your medication list with you and share with your provider. Update the information when medications are discontinued, doses are changed, or new medications (including over-the-counter products) are added; and carry medication information at all times in the event of emergency situations     Allergies:  No Known Allergies          Medications  Valid as of: July 25, 2017 -  6:41 PM    Generic Name Brand Name Tablet Size Instructions for use    Benzoyl Peroxide (Liquid) Benzoyl Peroxide 10 % 1 Application by Apply externally route 2 times a day.        Levothyroxine Sodium (Tab) SYNTHROID 25 MCG Take 1 Tab by mouth Every morning on an empty stomach.         Prenatal Multivit-Min-Fe-FA   Take  by mouth.        .                 Medicines prescribed today were sent to:     [facility] #[id number] - [place], [place] - [address]    [address] [place] [address]    Phone: [phone] Fax: [phone]    Open 24 Hours?: No      Medication refill instructions:       If your prescription bottle indicates you have medication refills left, it is not necessary to call your provider’s office. Please contact your pharmacy and they will refill your medication.    If your prescription bottle indicates you do not have any refills left, you may request refills at any time through one of the following ways: The online Shopcliq system (except Urgent Care), by calling your provider’s office, or by asking your pharmacy to contact your provider’s office with a refill request. Medication refills are processed only during regular business hours and may not be available until the next business day. Your provider may request additional information or to have a follow-up visit with you prior to refilling your medication.   *Please Note: Medication refills are assigned a new Rx number when refilled electronically. Your pharmacy may indicate that no refills were authorized even though a new prescription for the same medication is available at the pharmacy. Please request the medicine by name with the pharmacy before contacting your provider for a refill.        Your To Do List     Future Labs/Procedures Complete By Expires    FREE THYROXINE  As directed 7/26/2018    THYROID PEROXIDASE  (TPO) AB  As directed 7/26/2018    TSH  As directed 7/26/2018    US-SOFT TISSUES OF HEAD - NECK  As directed 1/25/2018         Shopcliq Access Code: Activation code not generated  Current Shopcliq Status: Active

## 2017-07-26 NOTE — ASSESSMENT & PLAN NOTE
Patient still has fear to have natural sexual intercourse. She and her  tried for natural intercourse but is not able to complete due to spasm and fear and pain. She stated that her  also has erectile dysfunction due to type 1 diabetes. She already saw behavioral therapist once. She has follow-up appointment with them next month.

## 2017-07-26 NOTE — ASSESSMENT & PLAN NOTE
I have a long discussion with patient that she needs to overcome the fear on natural sexual intercourse. She will work with her gynecologist and behavior therapist for psychologic vaginismus. She denies history of sexual abuse. I also discussed with patient that she will need to do pelvic ultrasound to evaluate for ovary and uterus if she worries about polycystic ovarian syndrome. Patient has not had pap smear or pelvic exam yet due to vaginismus. I also discussed with patient to have couples treatment with therapist. Her  also need to talk to his PCP for erectile dysfunction. I also discussed with patient that they will need to see specialist at infertility clinic if they still cannot naturally conceive after trying and treating with different methods. Patient understands and agrees with the plan.

## 2017-07-26 NOTE — ASSESSMENT & PLAN NOTE
Patient has elevated TSH with low normal T4. Her TSH was high on 2 consecutive blood tests. She has family history of hypothyroid on her mom and sister. She reported feeling tired and low energy most of the time. She has irregular menstrual cycle with mixed menstrual periods every 2-3 months. We discussed to try low-dose levothyroxine and she agreed to take levothyroxine.

## 2017-07-26 NOTE — PROGRESS NOTES
Subjective:   Milli Santizo is a 32 y.o. female here today for evaluation and management of:      Psychologic vaginismus  Patient still has fear to have natural sexual intercourse. She and her  tried for natural intercourse but is not able to complete due to spasm and fear and pain. She stated that her  also has erectile dysfunction due to type 1 diabetes. She already saw behavioral therapist once. She has follow-up appointment with them next month.    Hypothyroidism (acquired)  Patient has elevated TSH with low normal T4. Her TSH was high on 2 consecutive blood tests. She has family history of hypothyroid on her mom and sister. She reported feeling tired and low energy most of the time. She has irregular menstrual cycle with mixed menstrual periods every 2-3 months. We discussed to try low-dose levothyroxine and she agreed to take levothyroxine.    Family planning  I have a long discussion with patient that she needs to overcome the fear on natural sexual intercourse. She will work with her gynecologist and behavior therapist for psychologic vaginismus. She denies history of sexual abuse. I also discussed with patient that she will need to do pelvic ultrasound to evaluate for ovary and uterus if she worries about polycystic ovarian syndrome. Patient has not had pap smear or pelvic exam yet due to vaginismus. I also discussed with patient to have couples treatment with therapist. Her  also need to talk to his PCP for erectile dysfunction. I also discussed with patient that they will need to see specialist at infertility clinic if they still cannot naturally conceive after trying and treating with different methods. Patient understands and agrees with the plan.           Current medicines (including changes today)  Current Outpatient Prescriptions   Medication Sig Dispense Refill   • levothyroxine (SYNTHROID) 25 MCG Tab Take 1 Tab by mouth Every morning on an empty stomach. 30 Tab 6   •  Benzoyl Peroxide 10 % Liquid 1 Application by Apply externally route 2 times a day. 1 Bottle 3   • Prenatal Multivit-Min-Fe-FA (PRENATAL VITAMINS PO) Take  by mouth.       No current facility-administered medications for this visit.     She  has a past medical history of Positive PPD and Anxiety.    ROS   No chest pain, no shortness of breath, no abdominal pain       Objective:     Blood pressure 110/74, pulse 88, temperature 36.5 °C (97.7 °F), height 1.524 m (5'), weight 65.772 kg (145 lb), last menstrual period 07/17/2017, SpO2 97 %, not currently breastfeeding. Body mass index is 28.32 kg/(m^2).   Physical Exam:  General: Alert, oriented and no acute distress.  Eye contact is good, speech goal directed, affect calm  HEENT: conjunctiva non-injected, sclera non-icteric.  Oral mucous membranes pink and moist with no lesions.  Pinna normal.   Lungs: Normal respiratory effort, clear to auscultation bilaterally with good excursion.  CV: regular rate and rhythm. No murmurs.   Abdomen: soft, non distended, nontender, Bowel sound normal.  Ext: no edema, color normal, vascularity normal, temperature normal        Assessment and Plan:   The following treatment plan was discussed     1. Hypothyroidism (acquired)  - She will start levothyroxine 25 µg every morning. Reviewed the potential side effects reviewed with levothyroxine. Advised to take levothyroxine on an empty stomach. We will recheck labs in 3 months.  - We will do ultrasound neck for evaluation of thyroid gland.  - levothyroxine (SYNTHROID) 25 MCG Tab; Take 1 Tab by mouth Every morning on an empty stomach.  Dispense: 30 Tab; Refill: 6  - US-SOFT TISSUES OF HEAD - NECK; Future  - TSH; Future  - FREE THYROXINE; Future  - THYROID PEROXIDASE  (TPO) AB; Future    2. Psychologic vaginismus  - We have a long discussion to help her to understand how to relax and overcome her fear. I advised her to have couples treatment with her therapist, and to get assist with book or  movie or small vibrator for stimulation. She also needs to follow with gynecologist for detailed pelvic exam and Pap smear. She has appointment with her new gynecologist next month.  - I provided encouragement every visit and also advised to control her stress and anxiety as possible.    3. Family planning  - She is advised to have pelvic exam, Pap smear and pelvic ultrasound to rule out PCOS.  - She is advised to eat healthy diet and do regular cardio physical exercise to lose weight. She is advised to take prenatal vitamins daily.  - Advised her  to have detailed assessment for sperm analysis and treatment for erectile dysfunction as well.  - Advised patient to follow with therapist and endocrinologist as scheduled.  - If she can have natural sexual intercourse and she is still not able to conceive naturally, she will need to go to infertility clinic.  - She may need to have more blood tests for pituitary adrenal gonadal axis. I will defer those tests to infertility clinic if she needs to do further workup in the future.      Face-to-face time spent 40 minutes with patient and more than half of that time spent for counseling and cooperating of care for medical problems listed above.       Followup: Return in about 3 months (around 10/25/2017), or if symptoms worsen or fail to improve, for hypothyroid, irregular menstrual cycle, psychologic vaginismus, lab review.      Please note that this dictation was created using voice recognition software. I have made every reasonable attempt to correct obvious errors, but I expect that there may have unintended errors in text, spelling, punctuation, or grammar that I did not discover.

## 2017-08-17 ENCOUNTER — PATIENT MESSAGE (OUTPATIENT)
Dept: MEDICAL GROUP | Age: 32
End: 2017-08-17

## 2017-08-17 NOTE — TELEPHONE ENCOUNTER
From: Milli Santizo  To: Sandrine Pan M.D.  Sent: 8/17/2017 5:39 AM PDT  Subject: Prescription Question    [facility] Doctor Viviane,    My thyroid medicine is almost done. Can you please write another prescription order to [facility] pharmacy for refill?

## 2017-08-24 ENCOUNTER — OFFICE VISIT (OUTPATIENT)
Dept: BEHAVIORAL HEALTH | Facility: PHYSICIAN GROUP | Age: 32
End: 2017-08-24
Payer: COMMERCIAL

## 2017-08-24 DIAGNOSIS — F41.1 GENERALIZED ANXIETY DISORDER: ICD-10-CM

## 2017-08-24 DIAGNOSIS — F52.5 PSYCHOLOGIC VAGINISMUS: ICD-10-CM

## 2017-08-24 PROCEDURE — 90834 PSYTX W PT 45 MINUTES: CPT | Performed by: PSYCHOLOGIST

## 2017-08-24 NOTE — BH THERAPY
[facility]  Therapy Progress Note    Patient Name: Milli Santizo  Patient MRN: [id number]  Today's Date: 8/24/2017     Type of session:Individual psychotherapy  Length of session: 45 minutes  Persons in attendance:Patient    Subjective/New Info: Disc Pt's childhood: grew up in [place], enjoyed her childhood, however the rigors of school there were sometimes stressful. Father was strict. No one talked about sex to Pt - she had no idea what was happening to her when she started her period and even then, she just got the pragmatics from her mom. Pt learned about sex and intercourse from movies and books, both fiction and educational. The fear of the pain af first intercourse was planted in her mind then. Pt is unable to fully have intercourse. Disc the treatment approach for this problem. Some psychoeducation about her anatomy. Suggested that Pt use her own fingers, just one at first, to just become accustomed to having something inside her. As she becomes more comfortable , she is to insert another finger - moving it gently around, until she is able to insert three or four fingers. Disc the attitude in which she is to do this, being accepting, curious, non-judgemental. She can do this alone or with the support of her husb, but she is to be they only one inserting. Pt expressed understanding. Pt conts to not want meds.  Has great difficulty letting go of her busy, anxious thoughts.    Objective/Observations:   Participation: Active verbal participation and Attentive   Grooming: Casual   Cognition: Alert and Fully Oriented   Eye contact: Good   Mood: Anxious   Affect: Anxious   Thought process: Logical   Speech: Rate within normal limits   Other:     Diagnoses:   1. Generalized anxiety disorder    2. Psychologic vaginismus         Current risk:   SUICIDE: Not applicable   Homicide: Not applicable   Self-harm: Not applicable   Relapse: Not applicable   Other:    Safety Plan reviewed? Not Indicated   If  evidence of imminent risk is present, intervention/plan:     Therapeutic Intervention(s): Behavior:  Behavioral activation, Conflict clarification, Pain addressed, Relaxation exercise, Stressors assessed and Supportive psychotherapy    Treatment Goal(s)/Objective(s) addressed: Tx plan:  - Learn to successfully challenge & change distortions in thinking  - Learn to ameliorate effects of anxiety/panic on life and functioning  - Be able to have intercourse without pain  -        Progress toward Treatment Goals: No change    Plan:  - Continue Individual therapy    Meaghan Hernandez, Ph.D.  8/24/2017

## 2017-08-25 ENCOUNTER — GYNECOLOGY VISIT (OUTPATIENT)
Dept: OBGYN | Facility: CLINIC | Age: 32
End: 2017-08-25
Payer: COMMERCIAL

## 2017-08-25 VITALS
BODY MASS INDEX: 28.66 KG/M2 | SYSTOLIC BLOOD PRESSURE: 108 MMHG | HEIGHT: 60 IN | WEIGHT: 146 LBS | DIASTOLIC BLOOD PRESSURE: 70 MMHG

## 2017-08-25 DIAGNOSIS — N97.0 INFERTILITY ASSOCIATED WITH ANOVULATION: ICD-10-CM

## 2017-08-25 DIAGNOSIS — Z01.419 WELL WOMAN EXAM: ICD-10-CM

## 2017-08-25 PROCEDURE — 99213 OFFICE O/P EST LOW 20 MIN: CPT | Performed by: OBSTETRICS & GYNECOLOGY

## 2017-08-25 NOTE — MR AVS SNAPSHOT
Milli Santizo   2017 3:00 PM   Gynecology Visit   MRN: [id number]    Department:  [facility]   Dept Phone:  [phone]    Description:  Female : 1985   Provider:  Lavell Bullard M.D.           Reason for Visit     Gynecologic Exam           Allergies as of 2017     No Known Allergies      You were diagnosed with     Well woman exam   [129124]       Infertility associated with anovulation   [[id number]]         Vital Signs     Blood Pressure Height Weight Body Mass Index Last Menstrual Period Breastfeeding?    108/70 mmHg 1.524 m (5') 66.225 kg (146 lb) 28.51 kg/m2 2017 No    Smoking Status                   Never Smoker            Basic Information     Date Of Birth Sex Race Ethnicity Preferred Language    1985 Female Other Non- English      Your appointments     Aug 26, 2017  7:00 AM   [facility] Tracks with [facility]   [facility] (--)    [address]  [place] [address]   [phone]            Sep 06, 2017  4:00 PM   Individual Therapy with Meaghan Hernandez, Ph.D.   BEHAVIORAL HEALTH [facility])    [address] [facility]  [address]  [place] [id number] [phone]            Sep 21, 2017  3:00 PM   Individual Therapy with Meaghan Hernandez, Ph.D.   BEHAVIORAL HEALTH [address] [place] [address])    [address] [facility]  [address]  [place] [address]   [phone]            Sep 29, 2017  1:30 PM   US [id number] with S [place] US 2   [facility]    [facility]  [address] [facility]  [address]  [place] [address]   [phone]            Oct 12, 2017  5:20 PM   Established Patient with Sandrine Pan M.D.   [facility] [address] [facility]    [address] [facility] [place] [address] [phone]           You will be receiving a confirmation call a few days before your appointment from our automated call confirmation system.            2017  4:15 PM   GYN Visit with Lavell Bullard M.D.   [facility]  [address], [address]  [facility] [address]   [phone]            Nov 09, 2017  5:00 PM   Individual Therapy with Meaghan Hernandez, Ph.D.   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   3/23/2017 - Present    Irregular menstrual cycle N92.6   3/23/2017 - Present    Hypothyroidism (acquired) E03.9   4/5/2017 - Present    Acne vulgaris L70.0   4/5/2017 - Present    Meralgia paresthetica of right side G57.11   6/14/2017 - Present    Generalized anxiety disorder F41.1   6/14/2017 - Present      Health Maintenance        Date Due Completion Dates    IMM DTaP/Tdap/Td Vaccine (1 - Tdap) 6/27/2006 ---    PAP SMEAR 6/27/2008 ---    IMM INFLUENZA (1) 9/1/2017 10/18/2016, 10/21/2013, 1/25/2013            Current Immunizations     HPV Quadrivalent Vaccine (GARDASIL) 11/20/2013  2:45 PM    Influenza TIV (IM) 10/21/2013, 1/25/2013    Influenza Vaccine Quad Inj (Preserved) 10/18/2016    MMR Vaccine 12/13/2012, 11/13/2012  3:15 PM    Tuberculin Skin Test 4/1/2011      Below and/or attached are the medications your provider expects you to take. Review all of your home medications and newly ordered medications with your provider and/or pharmacist. Follow medication instructions as directed by your provider and/or pharmacist. Please keep your medication list with you and share with your provider. Update the information when medications are discontinued, doses are changed, or new medications (including over-the-counter products) are added; and carry medication information at all times in the event of emergency situations     Allergies:  No Known Allergies          Medications  Valid as of: August 25, 2017 -  4:12 PM    Generic Name Brand Name Tablet  Size Instructions for use    Benzoyl Peroxide (Liquid) Benzoyl Peroxide 10 % 1 Application by Apply externally route 2 times a day.        Levothyroxine Sodium (Tab) SYNTHROID 25 MCG Take 1 Tab by mouth Every morning on an empty stomach.        Prenatal Multivit-Min-Fe-FA   Take  by mouth.        .                 Medicines prescribed today were sent to:     [facility]ACY #[id number] - [place], [place] - [address]    [address] [place] [address]    Phone: [phone] Fax: [phone]    Open 24 Hours?: No      Medication refill instructions:       If your prescription bottle indicates you have medication refills left, it is not necessary to call your provider’s office. Please contact your pharmacy and they will refill your medication.    If your prescription bottle indicates you do not have any refills left, you may request refills at any time through one of the following ways: The online Stylehive system (except Urgent Care), by calling your provider’s office, or by asking your pharmacy to contact your provider’s office with a refill request. Medication refills are processed only during regular business hours and may not be available until the next business day. Your provider may request additional information or to have a follow-up visit with you prior to refilling your medication.   *Please Note: Medication refills are assigned a new Rx number when refilled electronically. Your pharmacy may indicate that no refills were authorized even though a new prescription for the same medication is available at the pharmacy. Please request the medicine by name with the pharmacy before contacting your provider for a refill.           Stylehive Access Code: Activation code not generated  Current Stylehive Status: Active

## 2017-08-26 ENCOUNTER — HOSPITAL ENCOUNTER (OUTPATIENT)
Dept: LAB | Facility: MEDICAL CENTER | Age: 32
End: 2017-08-26
Payer: COMMERCIAL

## 2017-08-26 LAB
BDY FAT % MEASURED: 31.7 %
BP DIAS: 86 MMHG
BP SYS: 114 MMHG
CHOLEST SERPL-MCNC: 183 MG/DL (ref 100–199)
DIABETES HTDIA: NO
EVENT NAME HTEVT: NORMAL
FASTING HTFAS: YES
GLUCOSE SERPL-MCNC: 90 MG/DL (ref 65–99)
HDLC SERPL-MCNC: 49 MG/DL
HYPERTENSION HTHYP: NO
LDLC SERPL CALC-MCNC: 114 MG/DL
SCREENING LOC CITY HTCIT: NORMAL
SCREENING LOC STATE HTSTA: NORMAL
SCREENING LOCATION HTLOC: NORMAL
SMOKING HTSMO: NO
SUBSCRIBER ID HTSID: NORMAL
TRIGL SERPL-MCNC: 98 MG/DL (ref 0–149)

## 2017-08-26 PROCEDURE — 80061 LIPID PANEL: CPT

## 2017-08-26 PROCEDURE — 82947 ASSAY GLUCOSE BLOOD QUANT: CPT

## 2017-08-26 PROCEDURE — 36415 COLL VENOUS BLD VENIPUNCTURE: CPT

## 2017-08-26 PROCEDURE — S5190 WELLNESS ASSESSMENT BY NONPH: HCPCS

## 2017-09-04 NOTE — PROGRESS NOTES
32 y.o.  female previously seen for : Chief Complaint:  infertility  Patient wished to deferred pelvic today  Specialty Problems     None      . Patient now here in follow up. Patient and SO masy have some sexual difficulty     Patient's last menstrual period was 2017.      Subjective: Abdominal Pain: negative    Vaginal Bleedingnegative  Menstrual Cycle: normal: possible cycle irregularity  Dysmenorrhea:negative:   Dyspareunia:negative  Urinary Symptoms: negative   Vaginal Discharge:{No          Current Outpatient Prescriptions:   •  levothyroxine (SYNTHROID) 25 MCG Tab, Take 1 Tab by mouth Every morning on an empty stomach., Disp: 30 Tab, Rfl: 6  •  Benzoyl Peroxide 10 % Liquid, 1 Application by Apply externally route 2 times a day., Disp: 1 Bottle, Rfl: 3  •  Prenatal Multivit-Min-Fe-FA (PRENATAL VITAMINS PO), Take  by mouth., Disp: , Rfl:   ROS: no change in ROS since visit of : Visit date not found.  :  Recent Results (from the past 336 hour(s))   [facility] BLOOD TESTS    Collection Time: 17  7:24 AM   Result Value Ref Range    Fasting? Yes     Cholesterol,Tot 183 100 - 199 mg/dL    Triglycerides 98 0 - 149 mg/dL    HDL 49 >=40 mg/dL     (H) <100 mg/dL    Glucose 90 65 - 99 mg/dL   [facility]    Collection Time: 17  7:53 AM   Result Value Ref Range    Hypertension? No     Height 61 in    Weight 142 lbs    Body Fat % 31.7 %    Systolic  <120 mmHg    Diastolic BP 86 (H) <80 mmHg   [facility] SCREENING DATA    Collection Time: 17  7:53 AM   Result Value Ref Range    Subscriber ID [facility]     Event Name Patient Visit     Location of Screening [facility]     Tobacco Use? No     Diabetes? No        Vitals:    17 1453   BP: 108/70   Weight: 66.2 kg (146 lb)   Height: 1.524 m (5')     Past Medical History:   Diagnosis Date   • Anxiety    • Positive PPD      PGYN: None  Social History     Social History   • Marital status: Single     Spouse name:  N/A   • Number of children: 0   • Years of education: N/A     Occupational History   • [employer]     Social History Main Topics   • Smoking status: Never Smoker   • Smokeless tobacco: Never Used   • Alcohol use Yes      Comment: 1 drink every few months   • Drug use: No   • Sexual activity: Yes     Partners: Male     Other Topics Concern   • Not on file     Social History Narrative   • No narrative on file     Family History   Problem Relation Age of Onset   • Arthritis Mother    • Thyroid Mother      hypothyroid   • Hypertension Father    • Arthritis Maternal Grandmother    • Hypertension Paternal Grandmother      No past surgical history on file.      Exam: deferred  Ass: desires pregnancy  Possible cycle irregualrity    Spent 15 minutes minutes with the patient ; Face to Face, with >50% of this time spent in counseling and coordination of care, surrounding the above mentioned issues as well as:   P. Luteal progesterone       TSH/PRL    Follow up : Return visit in 4 week(s)

## 2017-09-06 ENCOUNTER — OFFICE VISIT (OUTPATIENT)
Dept: BEHAVIORAL HEALTH | Facility: PHYSICIAN GROUP | Age: 32
End: 2017-09-06
Payer: COMMERCIAL

## 2017-09-06 DIAGNOSIS — F52.5 PSYCHOLOGIC VAGINISMUS: ICD-10-CM

## 2017-09-06 DIAGNOSIS — F41.1 GENERALIZED ANXIETY DISORDER: ICD-10-CM

## 2017-09-06 PROCEDURE — 90834 PSYTX W PT 45 MINUTES: CPT | Performed by: PSYCHOLOGIST

## 2017-09-07 ENCOUNTER — HOSPITAL ENCOUNTER (OUTPATIENT)
Dept: LAB | Facility: MEDICAL CENTER | Age: 32
End: 2017-09-07
Attending: OBSTETRICS & GYNECOLOGY
Payer: COMMERCIAL

## 2017-09-07 PROCEDURE — 84403 ASSAY OF TOTAL TESTOSTERONE: CPT

## 2017-09-07 PROCEDURE — 36415 COLL VENOUS BLD VENIPUNCTURE: CPT

## 2017-09-07 PROCEDURE — 84144 ASSAY OF PROGESTERONE: CPT

## 2017-09-07 PROCEDURE — 84443 ASSAY THYROID STIM HORMONE: CPT

## 2017-09-07 PROCEDURE — 84439 ASSAY OF FREE THYROXINE: CPT

## 2017-09-07 PROCEDURE — 84146 ASSAY OF PROLACTIN: CPT

## 2017-09-07 PROCEDURE — 83001 ASSAY OF GONADOTROPIN (FSH): CPT

## 2017-09-07 PROCEDURE — 82627 DEHYDROEPIANDROSTERONE: CPT

## 2017-09-07 PROCEDURE — 83002 ASSAY OF GONADOTROPIN (LH): CPT

## 2017-09-07 NOTE — BH THERAPY
" [facility]  Therapy Progress Note    Patient Name: Milli Santizo  Patient MRN: [id number]  Today's Date: 9/6/2017     Type of session:Individual psychotherapy  Length of session: 45 minutes  Persons in attendance:Patient    Subjective/New Info: Pt reports that she was unable to do the experience - she was only able to get one finger inside herself and was not able to do two fingers. Pt is quite un accepting and intolerant anything that presents as an unpleasant experience or may create any uncomfortable sensation or pain. Disc some tools she could use to learn to relax (relaxation tape, writing down her \"to do list\" in her mind prior to bed and setting it aside. Disc ways she could challenge the distortions in her thinking and use mindfulness to bring her attention to her present moment and examine what are the \"real\" issues /problems she is having and can actually work on solving and get her mind out of the land of \"what ifs'. Pt is having an unusually difficult time embracing these things. Pt might be a little more open to medication.     Objective/Observations:   Participation: Active verbal participation and Attentive   Grooming: Casual   Cognition: Alert and Fully Oriented   Eye contact: Good   Mood: Anxious   Affect: Anxious   Thought process: Logical   Speech: Rate within normal limits   Other:     Diagnoses:   1. Generalized anxiety disorder    2. Psychologic vaginismus         Current risk:   SUICIDE: Not applicable   Homicide: Not applicable   Self-harm: Not applicable   Relapse: Not applicable   Other:    Safety Plan reviewed? Not Indicated   If evidence of imminent risk is present, intervention/plan:     Therapeutic Intervention(s): Behavior:  Behavioral activation, Conflict clarification, Pain addressed, Relaxation exercise, Stressors assessed and Supportive psychotherapy    Treatment Goal(s)/Objective(s) addressed: Tx plan:  - Learn to successfully challenge & change distortions in " thinking  - Learn to ameliorate effects of anxiety/panic on life and functioning  - Be able to have intercourse without pain  -        Progress toward Treatment Goals: No change    Plan:  - Continue Individual therapy    Meaghan Hernandez, Ph.D.  9/6/2017

## 2017-09-08 LAB
DHEA-S SERPL-MCNC: 165.1 UG/DL (ref 98.8–340)
FSH SERPL-ACNC: 5.4 MIU/ML
LH SERPL-ACNC: 21 IU/L
PROGEST SERPL-MCNC: 0.48 NG/ML
PROLACTIN SERPL-MCNC: 18.53 NG/ML (ref 2.8–26)
T4 FREE SERPL-MCNC: 0.8 NG/DL (ref 0.53–1.43)
TESTOST SERPL-MCNC: 85 NG/DL (ref 9–75)
TSH SERPL DL<=0.005 MIU/L-ACNC: 3.5 UIU/ML (ref 0.3–3.7)

## 2017-09-11 ENCOUNTER — HOSPITAL ENCOUNTER (OUTPATIENT)
Dept: LAB | Facility: MEDICAL CENTER | Age: 32
End: 2017-09-11
Attending: OBSTETRICS & GYNECOLOGY
Payer: COMMERCIAL

## 2017-09-11 PROCEDURE — 36415 COLL VENOUS BLD VENIPUNCTURE: CPT

## 2017-09-11 PROCEDURE — 84144 ASSAY OF PROGESTERONE: CPT

## 2017-09-12 LAB — PROGEST SERPL-MCNC: 3.32 NG/ML

## 2017-09-29 ENCOUNTER — HOSPITAL ENCOUNTER (OUTPATIENT)
Dept: RADIOLOGY | Facility: MEDICAL CENTER | Age: 32
End: 2017-09-29
Attending: INTERNAL MEDICINE
Payer: COMMERCIAL

## 2017-09-29 ENCOUNTER — TELEPHONE (OUTPATIENT)
Dept: OBGYN | Facility: CLINIC | Age: 32
End: 2017-09-29

## 2017-09-29 DIAGNOSIS — E03.9 HYPOTHYROIDISM (ACQUIRED): ICD-10-CM

## 2017-09-29 PROCEDURE — 76536 US EXAM OF HEAD AND NECK: CPT

## 2017-09-30 NOTE — TELEPHONE ENCOUNTER
Pt wanting to know results. Informed patient dr dos santos is not in right now and will go over her results on Monday. Will call her back Monday with an answer. Pt verbalized understanding.

## 2017-10-03 ENCOUNTER — TELEPHONE (OUTPATIENT)
Dept: OBGYN | Facility: CLINIC | Age: 32
End: 2017-10-03

## 2017-10-03 NOTE — TELEPHONE ENCOUNTER
Consulted with Dr Bullard and he states it would be easiest to go over results with patient when she comes in for follow up appt. Patient is upset and states she wants to be fit in on 10/3/17. I explained to patient there are no appts available for her to be seen on 10/3/17. She states she wants to speak with manager in person, not over the phone. Will relay the message to Manager (Taylor RAMOS) on 10/3/17

## 2017-10-06 ENCOUNTER — IMMUNIZATION (OUTPATIENT)
Dept: OCCUPATIONAL MEDICINE | Facility: CLINIC | Age: 32
End: 2017-10-06

## 2017-10-06 DIAGNOSIS — Z23 NEED FOR VACCINATION: ICD-10-CM

## 2017-10-06 PROCEDURE — 90686 IIV4 VACC NO PRSV 0.5 ML IM: CPT | Performed by: PREVENTIVE MEDICINE

## 2017-10-17 ENCOUNTER — DOCUMENTATION (OUTPATIENT)
Dept: BEHAVIORAL HEALTH | Facility: PHYSICIAN GROUP | Age: 32
End: 2017-10-17

## 2017-10-20 ENCOUNTER — HOSPITAL ENCOUNTER (OUTPATIENT)
Dept: LAB | Facility: MEDICAL CENTER | Age: 32
End: 2017-10-20
Attending: INTERNAL MEDICINE
Payer: COMMERCIAL

## 2017-10-20 DIAGNOSIS — E03.9 HYPOTHYROIDISM (ACQUIRED): ICD-10-CM

## 2017-10-20 LAB
T4 FREE SERPL-MCNC: 0.81 NG/DL (ref 0.53–1.43)
THYROPEROXIDASE AB SERPL-ACNC: 14.9 IU/ML (ref 0–9)
TSH SERPL DL<=0.005 MIU/L-ACNC: 3.55 UIU/ML (ref 0.3–3.7)

## 2017-10-20 PROCEDURE — 36415 COLL VENOUS BLD VENIPUNCTURE: CPT

## 2017-10-20 PROCEDURE — 86376 MICROSOMAL ANTIBODY EACH: CPT

## 2017-10-20 PROCEDURE — 84439 ASSAY OF FREE THYROXINE: CPT

## 2017-10-20 PROCEDURE — 84443 ASSAY THYROID STIM HORMONE: CPT

## 2017-10-26 ENCOUNTER — OFFICE VISIT (OUTPATIENT)
Dept: MEDICAL GROUP | Age: 32
End: 2017-10-26
Payer: COMMERCIAL

## 2017-10-26 VITALS
HEART RATE: 86 BPM | HEIGHT: 60 IN | DIASTOLIC BLOOD PRESSURE: 66 MMHG | BODY MASS INDEX: 28.47 KG/M2 | OXYGEN SATURATION: 98 % | WEIGHT: 145 LBS | TEMPERATURE: 97.4 F | SYSTOLIC BLOOD PRESSURE: 120 MMHG

## 2017-10-26 DIAGNOSIS — E03.9 HYPOTHYROIDISM (ACQUIRED): ICD-10-CM

## 2017-10-26 DIAGNOSIS — Z30.09 FAMILY PLANNING: ICD-10-CM

## 2017-10-26 DIAGNOSIS — L70.0 ACNE VULGARIS: ICD-10-CM

## 2017-10-26 LAB
INT CON NEG: NEGATIVE
INT CON POS: POSITIVE
POC URINE PREGNANCY TEST: NEGATIVE

## 2017-10-26 PROCEDURE — 81025 URINE PREGNANCY TEST: CPT | Performed by: INTERNAL MEDICINE

## 2017-10-26 PROCEDURE — 99215 OFFICE O/P EST HI 40 MIN: CPT | Performed by: INTERNAL MEDICINE

## 2017-10-27 NOTE — ASSESSMENT & PLAN NOTE
Patient would like to have urine pregnancy test in clinic as she has not had her period since 8/18/17. She and her  try to conceive. She was already evaluated by endocrinologist and had hormone test done. The test showed that she has high testosterone that is more likely due to PCOS. She has follow-up appointment with gynecologist next week to discuss the treatment of PCOS.

## 2017-10-27 NOTE — PROGRESS NOTES
Subjective:   Milli Santizo is a 32 y.o. female here today for evaluation and management of:      Hypothyroidism (acquired)  Patient is taking levothyroxine 25 µg every morning. She felt energy level improved after taking the levothyroxine. Her thyroid function tests improved with levothyroxine. She denies side effects from taking it. Her thyroid ultrasound was normal and did not show thyroid nodule.  I reviewed all her recent labs and ultrasound Thyroid with her in clinic today. I answered all questions and concerns that she and her  have.     Family planning  Patient would like to have urine pregnancy test in clinic as she has not had her period since 8/18/17. She and her  try to conceive. She was already evaluated by endocrinologist and had hormone test done. The test showed that she has high testosterone that is more likely due to PCOS. She has follow-up appointment with gynecologist next week to discuss the treatment of PCOS.    Acne vulgaris  Patient reported worsening acne vulgaris on her chin. She wants to know the option of the treatment. She is using benzoyl peroxide 10% and it does not seem to help a lot. I had a long discussion with patient regarding different treatment option. She still wants to wait to try new oral medications or topical. She wants to meet her gynecologist next week and wants to see was kind of treatment plan that she will get.         Current medicines (including changes today)  Current Outpatient Prescriptions   Medication Sig Dispense Refill   • levothyroxine (SYNTHROID) 25 MCG Tab Take 1 Tab by mouth Every morning on an empty stomach. 30 Tab 6   • Benzoyl Peroxide 10 % Liquid 1 Application by Apply externally route 2 times a day. 1 Bottle 3   • Prenatal Multivit-Min-Fe-FA (PRENATAL VITAMINS PO) Take  by mouth.       No current facility-administered medications for this visit.      She  has a past medical history of Anxiety and Positive PPD.    ROS   No chest pain,  no shortness of breath, no abdominal pain       Objective:     Blood pressure 120/66, pulse 86, temperature 36.3 °C (97.4 °F), height 1.524 m (5'), weight 65.8 kg (145 lb), SpO2 98 %. Body mass index is 28.32 kg/m².   Physical Exam:  General: Alert, oriented and no acute distress.  Eye contact is good, speech goal directed, affect calm  HEENT: conjunctiva non-injected, sclera non-icteric.  Oral mucous membranes pink and moist with no lesions.  Pinna normal.   Lungs: Normal respiratory effort, clear to auscultation bilaterally with good excursion.  CV: regular rate and rhythm. No murmurs.  Abdomen: soft, non distended, nontender, Bowel sound normal.  Ext: no edema, color normal, vascularity normal, temperature normal        Assessment and Plan:   The following treatment plan was discussed     1. Family planning  - POCT urine pregnancy test in clinic was negative. Patient and  plan to conceive.  - She was recently diagnosed by gynecologist with PCOS.  - She has multiple questions and concerns regarding PCOS. I discussed the nature of disease with patient and reviewed the potential treatment option to induce ovulation.   - Patient has follow-up appointment with gynecologist next week.  - POCT Pregnancy    2. Hypothyroidism (acquired)  - Improved and well-controlled. Continue levothyroxine 25 µg every morning on empty stomach.  - Reviewed blood tests and ultrasound thyroid with patient.  - Answered all questions and concerns that she had.  - TSH; Future  - FREE THYROXINE; Future    3. Acne vulgaris  - We discussed different treatment options including topical antibiotic, oral antibiotic. We will avoid oral isotretinoin as she is planning to get pregnant.  - Patient does not want to start new treatment yet.  - Counseling to avoid sugar and dairy as much as she can.  - Continue benzoyl peroxide topical.    4. Health maintenance  - Patient will have Pap smear with gynecologist. She refused to have Tdap vaccine, even  after discussion of importance of immunization. She wants to postpone Tdap to future.    Face-to-face time spent 40 minutes with patient and more than half of that time spent for counseling and cooperating of care for medical problems listed above.       Followup: Return in about 6 months (around 4/26/2018), or if symptoms worsen or fail to improve, for hypothyroid, irregular menstrual cycle, acne, lab review.      Please note that this dictation was created using voice recognition software. I have made every reasonable attempt to correct obvious errors, but I expect that there may have unintended errors in text, spelling, punctuation, or grammar that I did not discover.

## 2017-10-27 NOTE — ASSESSMENT & PLAN NOTE
Patient is taking levothyroxine 25 µg every morning. She felt energy level improved after taking the levothyroxine. Her thyroid function tests improved with levothyroxine. She denies side effects from taking it. Her thyroid ultrasound was normal and did not show thyroid nodule.  I reviewed all her recent labs and ultrasound Thyroid with her in clinic today. I answered all questions and concerns that she and her  have.

## 2017-10-27 NOTE — ASSESSMENT & PLAN NOTE
Patient reported worsening acne vulgaris on her chin. She wants to know the option of the treatment. She is using benzoyl peroxide 10% and it does not seem to help a lot. I had a long discussion with patient regarding different treatment option. She still wants to wait to try new oral medications or topical. She wants to meet her gynecologist next week and wants to see was kind of treatment plan that she will get.

## 2017-11-01 ENCOUNTER — GYNECOLOGY VISIT (OUTPATIENT)
Dept: OBGYN | Facility: CLINIC | Age: 32
End: 2017-11-01
Payer: COMMERCIAL

## 2017-11-01 VITALS
DIASTOLIC BLOOD PRESSURE: 68 MMHG | SYSTOLIC BLOOD PRESSURE: 118 MMHG | HEIGHT: 60 IN | BODY MASS INDEX: 28.66 KG/M2 | WEIGHT: 146 LBS

## 2017-11-01 DIAGNOSIS — E28.2 PCOS (POLYCYSTIC OVARIAN SYNDROME): ICD-10-CM

## 2017-11-01 DIAGNOSIS — N92.6 IRREGULAR PERIODS/MENSTRUAL CYCLES: ICD-10-CM

## 2017-11-01 PROCEDURE — 99214 OFFICE O/P EST MOD 30 MIN: CPT | Performed by: OBSTETRICS & GYNECOLOGY

## 2017-11-02 NOTE — PROGRESS NOTES
32 y.o.  female previously seen for : Chief Complaint:  infertility    Specialty Problems     None      . Patient now here in follow up. PAtient here to follow up labs . Again does not want exam/pap, today is day 1 of cycle     Patient's last menstrual period was 2017.      Subjective: Abdominal Pain: negative    Vaginal Bleeding positive  Menstrual Cycle: normal: negative  Dysmenorrhea:positive:   Dyspareunia:positive  Urinary Symptoms: negative   Vaginal Discharge:{ No          Current Outpatient Prescriptions:   •  metformin (GLUCOPHAGE) 500 MG Tab, Take 1 Tab by mouth 2 times a day, with meals., Disp: 60 Tab, Rfl: 0  •  levothyroxine (SYNTHROID) 25 MCG Tab, Take 1 Tab by mouth Every morning on an empty stomach., Disp: 30 Tab, Rfl: 6  •  Benzoyl Peroxide 10 % Liquid, 1 Application by Apply externally route 2 times a day., Disp: 1 Bottle, Rfl: 3  •  Prenatal Multivit-Min-Fe-FA (PRENATAL VITAMINS PO), Take  by mouth., Disp: , Rfl:   ROS: no change in ROS since visit of : 10/3/2017.  :  Recent Results (from the past 336 hour(s))   TSH    Collection Time: 10/20/17  4:27 PM   Result Value Ref Range    TSH 3.550 0.300 - 3.700 uIU/mL   FREE THYROXINE    Collection Time: 10/20/17  4:27 PM   Result Value Ref Range    Free T-4 0.81 0.53 - 1.43 ng/dL   THYROID PEROXIDASE  (TPO) AB    Collection Time: 10/20/17  4:27 PM   Result Value Ref Range    Microsomal -Tpo- Abs 14.9 (H) 0.0 - 9.0 IU/mL   POCT Pregnancy    Collection Time: 10/26/17  6:05 PM   Result Value Ref Range    POC Urine Pregnancy Test Negative Negative    Internal Control Positive Positive     Internal Control Negative Negative        Vitals:    17 1622   BP: 118/68   Weight: 66.2 kg (146 lb)   Height: 1.524 m (5')     Past Medical History:   Diagnosis Date   • Anxiety    • Positive PPD      PGYN: None  Social History     Social History   • Marital status: Single     Spouse name: N/A   • Number of children: 0   • Years of education: N/A      Occupational History   • [employer] Information Management [employer]     Social History Main Topics   • Smoking status: Never Smoker   • Smokeless tobacco: Never Used   • Alcohol use Yes      Comment: 1 drink every few months   • Drug use: No   • Sexual activity: Yes     Partners: Male     Other Topics Concern   • Not on file     Social History Narrative   • No narrative on file     Family History   Problem Relation Age of Onset   • Arthritis Mother    • Thyroid Mother      hypothyroid   • Hypertension Father    • Arthritis Maternal Grandmother    • Hypertension Paternal Grandmother      No past surgical history on file.      Exam:  Patient declined     Labs : Results for AUNG BLANCHARD (MRN [id number]) as of 11/2/2017 09:00   Ref. Range 9/7/2017 16:39 9/11/2017 16:37 10/20/2017 16:27   TSH Latest Ref Range: 0.300 - 3.700 uIU/mL 3.500  3.550   Free T-4 Latest Ref Range: 0.53 - 1.43 ng/dL 0.80  0.81   Follicle Stimulating Hormone Latest Units: mIU/mL 5.4     Luteinizing Hormone Latest Units: IU/L 21.0     Testosterone,Total Latest Ref Range: 9 - 75 ng/dL 85 (H)     Prolactin Latest Ref Range: 2.80 - 26.00 ng/mL 18.53     Progesterone Latest Units: ng/mL 0.48 3.32    Dhea-S Latest Ref Range: 98.8 - 340.0 ug/dL 165.1       Ass: Infertility          Clinical and labs , c/w PCOS         Dyspareunia , and possible vaginismus     Spent 30 minutes minutes with the patient ; Face to Face, with >50% of this time spent in counseling and coordination of care, surrounding the above mentioned issues as well as: infertilty work up , PCOS , etiology , phased Rx process . Patient and SO , not really understanding process yet, thus recommend holding Clomid . Will start metformin     P. Metformin 500 mg BID      Repeat Luteal labs       Follow up : Return visit in 4 week(s)

## 2017-11-09 ENCOUNTER — APPOINTMENT (OUTPATIENT)
Dept: BEHAVIORAL HEALTH | Facility: PHYSICIAN GROUP | Age: 32
End: 2017-11-09
Payer: COMMERCIAL

## 2017-11-21 ENCOUNTER — HOSPITAL ENCOUNTER (OUTPATIENT)
Dept: LAB | Facility: MEDICAL CENTER | Age: 32
End: 2017-11-21
Attending: OBSTETRICS & GYNECOLOGY
Payer: COMMERCIAL

## 2017-11-21 LAB — PROGEST SERPL-MCNC: 8.1 NG/ML

## 2017-11-21 PROCEDURE — 36415 COLL VENOUS BLD VENIPUNCTURE: CPT

## 2017-11-21 PROCEDURE — 84144 ASSAY OF PROGESTERONE: CPT

## 2017-11-25 ENCOUNTER — HOSPITAL ENCOUNTER (OUTPATIENT)
Dept: LAB | Facility: MEDICAL CENTER | Age: 32
End: 2017-11-25
Attending: OBSTETRICS & GYNECOLOGY
Payer: COMMERCIAL

## 2017-11-25 LAB — PROGEST SERPL-MCNC: 8.23 NG/ML

## 2017-11-25 PROCEDURE — 84144 ASSAY OF PROGESTERONE: CPT

## 2017-11-25 PROCEDURE — 36415 COLL VENOUS BLD VENIPUNCTURE: CPT

## 2017-11-30 ENCOUNTER — HOSPITAL ENCOUNTER (OUTPATIENT)
Facility: MEDICAL CENTER | Age: 32
End: 2017-11-30
Attending: OBSTETRICS & GYNECOLOGY
Payer: COMMERCIAL

## 2017-11-30 ENCOUNTER — GYNECOLOGY VISIT (OUTPATIENT)
Dept: OBGYN | Facility: CLINIC | Age: 32
End: 2017-11-30
Payer: COMMERCIAL

## 2017-11-30 VITALS
BODY MASS INDEX: 29.06 KG/M2 | HEIGHT: 60 IN | DIASTOLIC BLOOD PRESSURE: 70 MMHG | SYSTOLIC BLOOD PRESSURE: 122 MMHG | WEIGHT: 148 LBS

## 2017-11-30 DIAGNOSIS — N97.0 INFERTILITY ASSOCIATED WITH ANOVULATION: ICD-10-CM

## 2017-11-30 DIAGNOSIS — E28.2 PCOS (POLYCYSTIC OVARIAN SYNDROME): ICD-10-CM

## 2017-11-30 PROCEDURE — 99213 OFFICE O/P EST LOW 20 MIN: CPT | Performed by: OBSTETRICS & GYNECOLOGY

## 2017-11-30 PROCEDURE — 87624 HPV HI-RISK TYP POOLED RSLT: CPT

## 2017-11-30 PROCEDURE — 88175 CYTOPATH C/V AUTO FLUID REDO: CPT

## 2017-11-30 NOTE — PROGRESS NOTES
32 y.o.  female previously seen for : Chief Complaint:  Infertility, sexual dyfunction , irregualr cycles     Specialty Problems     None      . Patient now here in follow up. PAtient begun on metformin at 500 mg daily . Here to check Progesterone leves. Now onm metformin .Patient as well need svaginal exam : has deferred x 2 before     Patient's last menstrual period was 2017.      Subjective: Abdominal Pain: positive    Vaginal Bleedingpositive  Menstrual Cycle: normal: negative  Dysmenorrhea:positive:   Dyspareunia:positive - unsure if consummated   Urinary Symptoms: negative   Vaginal Discharge:{No          Current Outpatient Prescriptions:   •  metformin (GLUCOPHAGE) 500 MG Tab, Take 1 Tab by mouth 2 times a day, with meals., Disp: 60 Tab, Rfl: 2  •  metformin (GLUCOPHAGE) 500 MG Tab, Take 1 Tab by mouth 2 times a day, with meals., Disp: 60 Tab, Rfl: 0  •  levothyroxine (SYNTHROID) 25 MCG Tab, Take 1 Tab by mouth Every morning on an empty stomach., Disp: 30 Tab, Rfl: 6  •  Benzoyl Peroxide 10 % Liquid, 1 Application by Apply externally route 2 times a day., Disp: 1 Bottle, Rfl: 3  •  Prenatal Multivit-Min-Fe-FA (PRENATAL VITAMINS PO), Take  by mouth., Disp: , Rfl:   ROS: no change in ROS since visit of : 2017.  :  Recent Results (from the past 336 hour(s))   PROGESTERONE    Collection Time: 17  4:47 PM   Result Value Ref Range    Progesterone 8.10 ng/mL   PROGESTERONE    Collection Time: 17 10:13 AM   Result Value Ref Range    Progesterone 8.23 ng/mL       Vitals:    17 1111   BP: 122/70   Weight: 67.1 kg (148 lb)   Height: 1.524 m (5')     Past Medical History:   Diagnosis Date   • Anxiety    • Positive PPD      PGYN: PCOS  Social History     Social History   • Marital status:      Spouse name: N/A   • Number of children: 0   • Years of education: N/A     Occupational History   • [employer]     Social History Main Topics   • Smoking status:  Never Smoker   • Smokeless tobacco: Never Used   • Alcohol use Yes      Comment: 1 drink every few months   • Drug use: No   • Sexual activity: Yes     Partners: Male     Other Topics Concern   • Not on file     Social History Narrative   • No narrative on file     Family History   Problem Relation Age of Onset   • Arthritis Mother    • Thyroid Mother      hypothyroid   • Hypertension Father    • Arthritis Maternal Grandmother    • Hypertension Paternal Grandmother      No past surgical history on file.      Exam:   General : Awake, alert and oriented x 3, Cranial nerves II-XII grossly intact  Abdominal : no masses, nontender, soft no rebound or guarding  Pelvic :  external genitalia normal, Bartholin's glands, urethra, [place]'s glands negative, hymen intact posteriorly ;  Vaginal blood noted   Pelvic Support system : normal    Labs :   Results for AUNG BLANCHARD (MRN [id number]) as of 11/30/2017 14:09   Ref. Range 9/29/2017 13:33 10/20/2017 16:27 10/26/2017 18:05 11/21/2017 16:47 11/25/2017 10:13   Progesterone Latest Units: ng/mL    8.10 8.23     Ass  PCOS, infertility   Sexual dysfunction     Spent 15 minutes minutes with the patient ; Face to Face, with >50% of this time spent in counseling and coordination of care, surrounding the above mentioned issues as well as:Need further improvement in ovulatory function . PAtient needs to return with SO,to discuss hymen issue , if desireous of children   P.     Follow up : Return visit in 4 week(s)  Increase metformin to 500 mg BID   Rec hck day 21 this month

## 2017-12-02 LAB
CYTOLOGY REG CYTOL: NORMAL
HPV HR 12 DNA CVX QL NAA+PROBE: NEGATIVE
HPV16 DNA SPEC QL NAA+PROBE: NEGATIVE
HPV18 DNA SPEC QL NAA+PROBE: NEGATIVE
SPECIMEN SOURCE: NORMAL

## 2017-12-11 DIAGNOSIS — F52.9 FEMALE SEXUAL DYSFUNCTION: ICD-10-CM

## 2017-12-11 DIAGNOSIS — E28.2 PCOS (POLYCYSTIC OVARIAN SYNDROME): ICD-10-CM

## 2017-12-11 DIAGNOSIS — N97.0 INFERTILITY ASSOCIATED WITH ANOVULATION: ICD-10-CM

## 2017-12-19 ENCOUNTER — HOSPITAL ENCOUNTER (OUTPATIENT)
Dept: LAB | Facility: MEDICAL CENTER | Age: 32
End: 2017-12-19
Attending: OBSTETRICS & GYNECOLOGY
Payer: COMMERCIAL

## 2017-12-19 PROCEDURE — 36415 COLL VENOUS BLD VENIPUNCTURE: CPT

## 2017-12-19 PROCEDURE — 84144 ASSAY OF PROGESTERONE: CPT

## 2017-12-20 ENCOUNTER — OFFICE VISIT (OUTPATIENT)
Dept: BEHAVIORAL HEALTH | Facility: PHYSICIAN GROUP | Age: 32
End: 2017-12-20
Payer: COMMERCIAL

## 2017-12-20 ENCOUNTER — TELEPHONE (OUTPATIENT)
Dept: OBGYN | Facility: CLINIC | Age: 32
End: 2017-12-20

## 2017-12-20 DIAGNOSIS — F41.1 GENERALIZED ANXIETY DISORDER: ICD-10-CM

## 2017-12-20 DIAGNOSIS — F52.5 PSYCHOLOGIC VAGINISMUS: ICD-10-CM

## 2017-12-20 LAB — PROGEST SERPL-MCNC: 0.56 NG/ML

## 2017-12-20 PROCEDURE — 90834 PSYTX W PT 45 MINUTES: CPT | Performed by: PSYCHOLOGIST

## 2017-12-20 NOTE — TELEPHONE ENCOUNTER
Called and let her know that she was referred to  due to infertility  and anovulation. Pt  States she will discuss on her next appt with the doctor.

## 2017-12-20 NOTE — TELEPHONE ENCOUNTER
----- Message from Meaghan Romano sent at 12/15/2017  3:10 PM PST -----  Regarding: questions  Contact: [phone]  Patient called and wanted to know why she was referred out to a fertility Dr? Please call back thank you.

## 2017-12-21 NOTE — BH THERAPY
[facility]  Therapy Progress Note    Patient Name: Milli Santizo  Patient MRN: [id number]  Today's Date: 12/20/2017     Type of session:Individual psychotherapy  Length of session: 45 minutes  Persons in attendance:Patient    Subjective/New Info: Pt reports she conts to try to have sex with husb but is unable to accomplish the act every time she feels any kind of pain. Pt wants a simple solution or thing she can apply that will make all her problems go away. Disc how her anxiety and non-acceptance of anything unpleasant is driving her troubles and avoidance behaviors. Disc approaches to changing this and that this would be a long process for her. Disc how medication might soften her over anxious brain and make it easier for her to make use of therapy. Suggested we find a specialist in sexual problems to work with her on the sex issues and work here on the anxiety problems. Psychoeducation and practice on DBT Mindfulness skills to reduce emotional suffering. Handed out flyer and links on practice of mindfulness. Psychoeducation and practice re  use of DBT Distress Tolerance skills: Utilizing distraction, radical acceptance, mindfulness, sensory observation, creating space, positive comparison, pleasant experience. Set up medication eval.  Will help Pt find a therapist who specializes in sexual problems.    Objective/Observations:   Participation: Active verbal participation and Attentive   Grooming: Casual   Cognition: Alert and Fully Oriented   Eye contact: Good   Mood: Anxious   Affect: Anxious   Thought process: Logical   Speech: Rate within normal limits   Other:     Diagnoses:   1. Generalized anxiety disorder    2. Psychologic vaginismus         Current risk:   SUICIDE: Not applicable   Homicide: Not applicable   Self-harm: Not applicable   Relapse: Not applicable   Other:    Safety Plan reviewed? Not Indicated   If evidence of imminent risk is present, intervention/plan:     Therapeutic  Intervention(s): Behavior:  Behavioral activation, Conflict clarification, Pain addressed, Relaxation exercise, Stressors assessed and Supportive psychotherapy    Treatment Goal(s)/Objective(s) addressed: Tx plan:  - Learn to successfully challenge & change distortions in thinking  - Learn to ameliorate effects of anxiety/panic on life and functioning  - Be able to have intercourse without pain  -        Progress toward Treatment Goals: No change    Plan:  - Continue Individual therapy    Meaghan Hernandez, Ph.D.  12/20/2017

## 2018-01-11 ENCOUNTER — GYNECOLOGY VISIT (OUTPATIENT)
Dept: OBGYN | Facility: CLINIC | Age: 33
End: 2018-01-11
Payer: COMMERCIAL

## 2018-01-11 VITALS — BODY MASS INDEX: 28.32 KG/M2 | SYSTOLIC BLOOD PRESSURE: 112 MMHG | WEIGHT: 145 LBS | DIASTOLIC BLOOD PRESSURE: 70 MMHG

## 2018-01-11 DIAGNOSIS — F52.9 FEMALE SEXUAL DYSFUNCTION: ICD-10-CM

## 2018-01-11 DIAGNOSIS — N97.0 INFERTILITY ASSOCIATED WITH ANOVULATION: ICD-10-CM

## 2018-01-11 PROCEDURE — 99214 OFFICE O/P EST MOD 30 MIN: CPT | Performed by: OBSTETRICS & GYNECOLOGY

## 2018-01-27 ENCOUNTER — HOSPITAL ENCOUNTER (OUTPATIENT)
Dept: LAB | Facility: MEDICAL CENTER | Age: 33
End: 2018-01-27
Attending: OBSTETRICS & GYNECOLOGY
Payer: COMMERCIAL

## 2018-01-27 PROCEDURE — 36415 COLL VENOUS BLD VENIPUNCTURE: CPT

## 2018-01-27 PROCEDURE — 82951 GLUCOSE TOLERANCE TEST (GTT): CPT

## 2018-01-27 PROCEDURE — 83525 ASSAY OF INSULIN: CPT | Mod: 91

## 2018-01-27 PROCEDURE — 82952 GTT-ADDED SAMPLES: CPT

## 2018-02-01 LAB
GLUCOSE 1H P CHAL SERPL-MCNC: 168 MG/DL (ref 65–199)
GLUCOSE 2H P CHAL SERPL-MCNC: 117 MG/DL (ref 65–139)
GLUCOSE BS SERPL-MCNC: 98 MG/DL (ref 65–99)
INSULIN 1H P CHAL SERPL-ACNC: 105 UIU/ML (ref 29–88)
INSULIN 2H P CHAL SERPL-ACNC: 283 UIU/ML (ref 22–79)
INSULIN P FAST SERPL-ACNC: 33 UIU/ML (ref 3–19)

## 2018-02-01 NOTE — PROGRESS NOTES
32 y.o.  female previously seen for : Chief Complaint:  infertility    Specialty Problems     None      . Patient now here in follow up. Patient with dx of infertility , with both poor ovulatory response , probable PCOS , and secondary to lack of vaginal penetration , no real chance for conception : vaginismus     Patient's last menstrual period was 2017.      Subjective: Abdominal Pain: negative    Vaginal Bleedingnegative  Menstrual Cycle: normal: negative  Dysmenorrhea:positive:   Dyspareunia:not tested  Urinary Symptoms: no   Vaginal Discharge:{No          Current Outpatient Prescriptions:   •  metformin (GLUCOPHAGE) 500 MG Tab, Take 1 Tab by mouth 2 times a day, with meals., Disp: 60 Tab, Rfl: 2  •  metformin (GLUCOPHAGE) 500 MG Tab, Take 1 Tab by mouth 2 times a day, with meals., Disp: 60 Tab, Rfl: 0  •  levothyroxine (SYNTHROID) 25 MCG Tab, Take 1 Tab by mouth Every morning on an empty stomach., Disp: 30 Tab, Rfl: 6  •  Benzoyl Peroxide 10 % Liquid, 1 Application by Apply externally route 2 times a day., Disp: 1 Bottle, Rfl: 3  •  Prenatal Multivit-Min-Fe-FA (PRENATAL VITAMINS PO), Take  by mouth., Disp: , Rfl:   ROS: no change in ROS since visit of : 2017.  :  Recent Results (from the past 336 hour(s))   GLUCOSE/INSULIN RESP (5 SPEC)    Collection Time: 18  7:30 AM   Result Value Ref Range    Glucose, Fasting 98 65 - 99 mg/dL    Glucose 1 Hour 168 65 - 199 mg/dL    Glucose 2 Hour 117 65 - 139 mg/dL    Insulin 1Hr (60 min) 105 (H) 29 - 88 uIU/mL    Insulin 2Hr (120 min( 283 (H) 22 - 79 uIU/mL    Insulin Fasting 33 (H) 3 - 19 uIU/mL       Vitals:    18 1042   BP: 112/70   Weight: 65.8 kg (145 lb)     Past Medical History:   Diagnosis Date   • Anxiety    • Positive PPD      PGYN: lack of sexual activity , and infertility   Social History     Social History   • Marital status:      Spouse name: N/A   • Number of children: 0   • Years of education: N/A     Occupational History    • Health Information Management [employer]     Social History Main Topics   • Smoking status: Never Smoker   • Smokeless tobacco: Never Used   • Alcohol use Yes      Comment: 1 drink every few months   • Drug use: No   • Sexual activity: Yes     Partners: Male     Other Topics Concern   • Not on file     Social History Narrative   • No narrative on file     Family History   Problem Relation Age of Onset   • Arthritis Mother    • Thyroid Mother      hypothyroid   • Hypertension Father    • Arthritis Maternal Grandmother    • Hypertension Paternal Grandmother      No past surgical history on file.      Exam:  Deferred   Labs :     Results for AUNG BLANCHARD (MRN [id number]) as of 2/1/2018 15:30   Ref. Range 12/19/2017 15:47   Progesterone Latest Units: ng/mL 0.56    day 21 of cycle     Ass:     Spent 30 minutes minutes with the patient ; Face to Face, with >50% of this time spent in counseling and coordination of care, surrounding the above mentioned issues as well as:problems with hormonal response , PCOS , on metformin , and vaginismus , makes her better candidate for IUI, or IVF  Suggest need to [facility] to [facility] for these procedures     P. Refer to Dr. Armstrong        No Clomid as no sex       Continue Metformin as ordered 500 mg BID       RTC after conception

## 2018-02-06 DIAGNOSIS — E03.9 HYPOTHYROIDISM (ACQUIRED): ICD-10-CM

## 2018-02-06 RX ORDER — LEVOTHYROXINE SODIUM 0.03 MG/1
25 TABLET ORAL
Qty: 30 TAB | Refills: 6 | Status: SHIPPED | OUTPATIENT
Start: 2018-02-06 | End: 2018-09-11 | Stop reason: SDUPTHER

## 2018-02-13 ENCOUNTER — OFFICE VISIT (OUTPATIENT)
Dept: BEHAVIORAL HEALTH | Facility: PHYSICIAN GROUP | Age: 33
End: 2018-02-13
Payer: COMMERCIAL

## 2018-02-13 VITALS
BODY MASS INDEX: 28.47 KG/M2 | HEART RATE: 77 BPM | DIASTOLIC BLOOD PRESSURE: 84 MMHG | HEIGHT: 60 IN | WEIGHT: 145 LBS | SYSTOLIC BLOOD PRESSURE: 112 MMHG

## 2018-02-13 DIAGNOSIS — F52.5 PSYCHOLOGIC VAGINISMUS: ICD-10-CM

## 2018-02-13 DIAGNOSIS — F41.1 GENERALIZED ANXIETY DISORDER: ICD-10-CM

## 2018-02-13 PROCEDURE — 99215 OFFICE O/P EST HI 40 MIN: CPT | Performed by: PSYCHIATRY & NEUROLOGY

## 2018-02-13 RX ORDER — DIAZEPAM 5 MG/1
5 TABLET ORAL
Qty: 5 TAB | Refills: 0 | Status: SHIPPED
Start: 2018-02-13 | End: 2018-03-15

## 2018-02-13 ASSESSMENT — PATIENT HEALTH QUESTIONNAIRE - PHQ9: CLINICAL INTERPRETATION OF PHQ2 SCORE: 0

## 2018-02-13 NOTE — PROGRESS NOTES
INITIAL PSYCHIATRY EVALUATION      Chief Complaint   Patient presents with   • Establish Care   • Anxiety         History Of Present Illness:  Milli Santizo is a 32 y.o. old female with history of hypothyroidism, PCOS, anxiety disorder comes in today to establish care and for evaluation of anxiety. She reports having anxiety for majority of her adult life but it has been manageable without medications. She reports worrying excessively about things in her life. She has been  to her  who has Type 1 diabetes which appears to be under good control. However, he worries about his health excessively and has a difficult time relaxing herself. She is close with her parents who live in town as well and if she does not hear from them for a while she starts getting anxious about their health. She has a difficult time relaxing herself and sometimes her anxiety interferes with sleep as well. She is also struggling with having a sexual relationship with her . She got  to him in 2015 but has not been able to have a sexual relationship with him. She has extreme anxiety surrounding sexual intercourse which makes it really difficult for her to be sexually active with her  at this time. She grew up in [place] and did not have much knowledge about sexuality either from her school or  family. She states that she went to an all girls school and college and was not around boys much. She does not have that close relationship with her mother, older sister or any other friends to discuss this issue with them. She has been having a lot of pressure from her parents to have a grandchild as her older sister is also struggling with infertility and does not have any kids. She and her  want kids as well but she is extremely sensitive to pain and is not sure how she will be able to handle the pain during labor. She states that she and her  waited for a year before they tried to have sex. She does not  "have body esteem issues and feels comfortable in her skin. She does not masturbate and has extremely good support from her . She states that her  has tried penetration but it is extremely painful and she is unable to continue having sex. She did state that her OB was able to do up her vaginal examination last year which was uncomfortable but she was told that her hymen is \"partially intact\". She gets extremely anxious if she and her  are trying to have sex and is unable to relax herself and go through the process. She does drink alcohol a few times a month but does not feel relaxed or has attempted to have sex after alcohol. Her  has type 2 diabetes and is having some erectile dysfunction because of it. She stated that he was prescribed Viagra but they have never felt that because of cost issues. She does have some situational depression regarding this issue but denies persistent or prolonged symptoms of depression. Denies any self-harm behavior. Denies having thoughts of wanting to hurt herself. The speed    Current psychiatric medications - None     Past Psychiatric History:  Denies history of suicide attempt or prior inpatient psychiatry hospitalization. She endorses some situational depression but denies persistent or prolonged symptoms of depression. Denies prior symptoms consistent with hypomania/sara or psychosis.  Previous medication trials - None     Past Medical/Surgical History:  Past Medical History:   Diagnosis Date   • Anxiety    • Positive PPD      History reviewed. No pertinent surgical history.    Family Psychiatric History:  Denies    Substance Use/Addiction History:  Alcohol - Drinks infrequently, 2-3 times a month, denies heavy drinking or binge drinking or black outs  Nicotine - Denies  Illicit drugs - Denies    Social History:  She was born and raised in [place] and she immigrated to [place] in 2009 with her parents. She has an older sister who lives in [place]. " She got  in 2015 and lives with her  in [place]. 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    Allergies:  Patient has no known allergies.    Medications:  Current Outpatient Prescriptions   Medication Sig Dispense Refill   • diazePAM (VALIUM) 5 MG Tab Take 1 Tab by mouth 1 time daily as needed for Anxiety for up to 30 days. 5 Tab 0   • levothyroxine (SYNTHROID) 25 MCG Tab TAKE 1 TAB BY MOUTH EVERY MORNING ON AN EMPTY STOMACH. 30 Tab 6   • metformin (GLUCOPHAGE) 500 MG Tab Take 1 Tab by mouth 2 times a day, with meals. 60 Tab 2   • Prenatal Multivit-Min-Fe-FA (PRENATAL VITAMINS PO) Take  by mouth.     • Benzoyl Peroxide 10 % Liquid 1 Application by Apply externally route 2 times a day. 1 Bottle 3     No current facility-administered medications for this visit.        Review of Symptoms:  Constitutional - Negative for fatigue  Eyes - Negative for blurry vision  HEENT - Negative for sore throat  Respiratory - Negative for shortness of breath, cough  CVS - Negative for chest pain, palpitations  GI - Negative for nausea, vomiting, abdominal pain, diarrhea, constipation  Skin - Negative for rash  Musculoskeletal - Negative for back pain  Neurological - Negative for headaches  Psychiatric - Positive for anxiety    Physical Examination:  Vital signs: /84   Pulse 77   Ht 1.524 m (5')   Wt 65.8 kg (145 lb)   BMI 28.32 kg/m²     Musculoskeletal: Normal gait. No abnormal movements.     Mental Status Evaluation:   General: Young  female, dressed in casual attire, good grooming and hygiene, in no apparent distress, calm and cooperative, good eye contact, no psychomotor agitation or retardation  Orientation: Alert and oriented to person, place and time  Recent and remote memory: Intact  Attention span and concentration: Intact  Speech: Spontaneous, normal rate, rhythm and tone  Thought Process: Linear, logical and goal directed  Thought Content: Denies suicidal or  "homicidal ideations, intent or plan  Perception: Denies auditory or visual hallucinations. No delusions noted  Associations: Intact  Language: Appropriate  Fund of knowledge and vocabulary: Adequate  Mood: \"good\"  Affect: Anxious, mood congruent  Insight: Good  Judgment: Good    Depression screening:  Depression Screen (PHQ-2/PHQ-9) 3/23/2017 2/13/2018   PHQ-2 Total Score 0 0       Interpretation of PHQ-9 Total Score   Score Severity   1-4 No Depression   5-9 Mild Depression   10-14 Moderate Depression   15-19 Moderately Severe Depression   20-27 Severe Depression    Medical Records/Labs/Diagnostic Tests Reviewed:  [place]  records - no prescribe controlled medications found in the last 3 years    Impression:  Young  female with generalized anxiety symptoms and situational anxiety surrounding sexual intercourse.    1. Generalized anxiety disorder  2. Psychological vaginismus     Plan:  1. I discussed treatment options for her anxiety management at length with her.  She does appear to be good candidate for SSRI/SNRI medications but there is a risk of sexual dysfunction as a side effect and I would like to avoid those side effects if possible. Her primary anxiety at this time secondary to not being able to have a sexual relationship with her . She will likely benefit from as needed use of benzodiazepines for her vaginismus. I have prescribed her 5 mg of Valium and instructed her to use it 30 minutes before intercourse which should help her with mental anxiety as well muscle relxation. I have also advised her to get the prescription of Viagra filled for her  as he has difficulty maintaining his erection for more than a minute. I encouraged her to use other relaxation/distraction techniques like music etc. whenever she plans on having intercourse with her . I also encouraged her to use a vibrator and get a clitoral orgasm which can help her relax and make penetration easier.  2. Provided " supportive psychotherapy (> 16 minutes). Discussed nonpharmacological techniques of anxiety management. Discussed normal expectations with sexual intercourse.    Return to clinic in 2 months or sooner if symptoms worsen    The proposed treatment plan was discussed with the patient who was provided the opportunity to ask questions and make suggestions regarding alternative treatment. Patient verbalized understanding and expressed agreement with the plan.     Total face-to-face time: 45 minutes  More than 50% of face-to-face time was spent in counseling and coordinating care. Discussed anxiety and vaginismus management.     Yessica Medeiros M.D.  02/13/18    This note was created using voice recognition software (Dragon). The accuracy of the dictation is limited by the abilities of the software. I have reviewed the note prior to signing, however some errors in grammar and context are still possible. If you have any questions related to this note please do not hesitate to contact our office.     CC:  Sandrine Pan M.D

## 2018-03-09 ENCOUNTER — OFFICE VISIT (OUTPATIENT)
Dept: BEHAVIORAL HEALTH | Facility: PHYSICIAN GROUP | Age: 33
End: 2018-03-09
Payer: COMMERCIAL

## 2018-03-09 DIAGNOSIS — F41.1 GENERALIZED ANXIETY DISORDER: ICD-10-CM

## 2018-03-09 DIAGNOSIS — F52.5 PSYCHOLOGIC VAGINISMUS: ICD-10-CM

## 2018-03-09 PROCEDURE — 90834 PSYTX W PT 45 MINUTES: CPT | Performed by: PSYCHOLOGIST

## 2018-03-10 NOTE — BH THERAPY
[facility]  Therapy Progress Note    Patient Name: Milli Santizo  Patient MRN: [id number]  Today's Date: 3/9/2018     Type of session:Individual psychotherapy  Length of session: 45 minutes  Persons in attendance:Patient    Subjective/New Info: Pt reports she conts to try to have sex with husb but is unable to accomplish the act every time she feels any kind of pain. Pt is unable to to take antidepressant bc it reduces sexual drive and she reports the few benzos she was given does nothing to help her have intercourse. Disc referring Pt to a trained sex theapist. Disc how this one problem keeps her anxiety alive. Gave her two female names and Pt said she would contact them. Suggested that work on her anxiety can continue after she resolves this issue first, then she can maybe try an antidepressant. Pt agreed and will either return to work on other issues around her anxiety or stay with the other therapist.     Objective/Observations:   Participation: Active verbal participation and Attentive   Grooming: Casual   Cognition: Alert and Fully Oriented   Eye contact: Good   Mood: Anxious   Affect: Anxious   Thought process: Logical   Speech: Rate within normal limits   Other:     Diagnoses:   1. Psychologic vaginismus    2. Generalized anxiety disorder         Current risk:   SUICIDE: Not applicable   Homicide: Not applicable   Self-harm: Not applicable   Relapse: Not applicable   Other:    Safety Plan reviewed? Not Indicated   If evidence of imminent risk is present, intervention/plan:     Therapeutic Intervention(s): Behavior:  Behavioral activation, Develop/modify treatment plan, Stressors assessed and Supportive psychotherapy    Treatment Goal(s)/Objective(s) addressed: Tx plan:  - Learn to successfully challenge & change distortions in thinking  - Learn to ameliorate effects of anxiety/panic on life and functioning  - Be able to have intercourse without pain  -        Progress toward Treatment Goals:  No change    Plan:  - Continue Medication management  - referred to sex therapist specialist    Meaghan Hernandez, Ph.D.  3/9/2018

## 2018-04-09 ENCOUNTER — OFFICE VISIT (OUTPATIENT)
Dept: BEHAVIORAL HEALTH | Facility: PHYSICIAN GROUP | Age: 33
End: 2018-04-09
Payer: COMMERCIAL

## 2018-04-09 VITALS
HEIGHT: 60 IN | HEART RATE: 83 BPM | BODY MASS INDEX: 28.27 KG/M2 | WEIGHT: 144 LBS | SYSTOLIC BLOOD PRESSURE: 126 MMHG | DIASTOLIC BLOOD PRESSURE: 82 MMHG

## 2018-04-09 DIAGNOSIS — F41.1 GENERALIZED ANXIETY DISORDER: ICD-10-CM

## 2018-04-09 DIAGNOSIS — F52.5 PSYCHOLOGIC VAGINISMUS: ICD-10-CM

## 2018-04-09 PROCEDURE — 99214 OFFICE O/P EST MOD 30 MIN: CPT | Performed by: PSYCHIATRY & NEUROLOGY

## 2018-04-09 RX ORDER — DIAZEPAM 5 MG/1
5 TABLET ORAL
Qty: 15 TAB | Refills: 0 | Status: SHIPPED
Start: 2018-04-09 | End: 2018-05-09

## 2018-04-09 NOTE — PROGRESS NOTES
"PSYCHIATRY FOLLOW-UP NOTE      Chief Complaint   Patient presents with   • Follow-Up     anxiety         History Of Present Illness:  Milli Santizo is a 32 y.o. old female with generalized anxiety disorder, vaginismus, hypothyroidism, PCOS comes in today for follow up, was last seen for an initial evaluation 2 months ago. She reports stable anxiety symptoms since her last visit with me. She did try to have intercourse after taking Valium and feels that \"it did work\". She does feel that her  was able to penetrate her at that time. She feels that she and her  are busy during the week with their jobs and to not have much time for sex. She has good support from her  in regards to her anxiety and she is appreciative of the same. She did notice some drowsiness with Valium but did not notice any side effects. She tried to take Valium the second time that did not notice the same efficacy is the first time. She feels that if she does not try to have sex on a regular basis her anxiety and pain during sexual intercourse comes back again. She would like to have a healthy sexual life before getting pregnant which is our eventual goal. She does have anxiety in other scenarios of her life but is denying much impairments in other aspects. Denies any recent panic attacks. She is also not happy with her job and is looking into changing careers at this time. Denies any relationship stressors. Denies any current mood symptoms. Sleep and appetite have been stable.    Social History:   She was born and raised in [place] and immigrated to  in 2009 with her parents. She has an older sister who lives in [place]. She got  in 2015 and has no kids. She lives with her  in [place]. 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    Substance Use:  Alcohol - Drinks alcohol may be 2-3 times a month  Nicotine - Denies  Illicit drugs - Denies    Past Medication Trials:  None " "    Medications:  Current Outpatient Prescriptions   Medication Sig Dispense Refill   • diazepam (VALIUM) 5 MG Tab Take 1 Tab by mouth 1 time daily as needed for Anxiety for up to 30 days. 15 Tab 0   • levothyroxine (SYNTHROID) 25 MCG Tab TAKE 1 TAB BY MOUTH EVERY MORNING ON AN EMPTY STOMACH. 30 Tab 6   • metformin (GLUCOPHAGE) 500 MG Tab Take 1 Tab by mouth 2 times a day, with meals. 60 Tab 2   • Benzoyl Peroxide 10 % Liquid 1 Application by Apply externally route 2 times a day. 1 Bottle 3   • Prenatal Multivit-Min-Fe-FA (PRENATAL VITAMINS PO) Take  by mouth.       No current facility-administered medications for this visit.        Review Of Systems:    Constitutional - Negative for fatigue  Respiratory - Negative for shortness of breath, cough  CVS - Negative for chest pain, palpitations  GI - Negative for nausea, vomiting, abdominal pain, diarrhea, constipation  Musculoskeletal - Negative for back pain  Neurological - Negative for headaches  Psychiatric - Positive for anxiety    Physical Examination:  Vital signs: /82   Pulse 83   Ht 1.524 m (5')   Wt 65.3 kg (144 lb)   BMI 28.12 kg/m²     Musculoskeletal: Normal gait. No abnormal movements.     Mental Status Evaluation:   General: Young  female, dressed in casual attire, good grooming and hygiene, in no apparent distress, calm and cooperative, good eye contact, no psychomotor agitation or retardation  Orientation: Alert and oriented to person, place and time  Recent and remote memory: Grossly intact  Attention span and concentration: Grossly intact  Speech: Spontaneous, normal rate, rhythm and tone  Thought Process: Linear, logical and goal directed  Thought Content: Denies suicidal or homicidal ideations, intent or plan  Perception: Denies auditory or visual hallucinations. No delusions noted  Associations: Intact  Language: Appropriate  Fund of knowledge and vocabulary: Grossly adequate  Mood: \"am okay\"  Affect: Euthymic, mood " congruent  Insight: Good  Judgment: Good    Depression screening:  Depression Screen (PHQ-2/PHQ-9) 3/23/2017 2/13/2018   PHQ-2 Total Score 0 0       Interpretation of PHQ-9 Total Score   Score Severity   1-4 No Depression   5-9 Mild Depression   10-14 Moderate Depression   15-19 Moderately Severe Depression   20-27 Severe Depression    Medical Records/Labs/Diagnostic Tests Reviewed:  [place]  records - appropriate refills, no abuse suspected       Impression:  1. Generalized anxiety disorder - stable   2. Psychological vaginismus - stable     Plan:  1. Continue Valium 5 mg as needed for anxiety related with sexual intercourse. # 15 tablets with no refill prescription faxed to her pharmacy.  2. Will continue to avoid SSRI/SNRI medications for anxiety management given sexual side effects with those medications. She is also looking forward to getting pregnant in the near future and would like to avoid medications if possible during pregnancy.  3. She has information about sex therapists in [facility] but is not interested in therapy at this time. I have advised her to keep sex as a priority in her marriage and to try to have sex at least once or twice a week to alleviate her anxiety related to intercourse.    Return to clinic in 3 months or sooner if symptoms worsen    The proposed treatment plan was discussed with the patient who was provided the opportunity to ask questions and make suggestions regarding alternative treatment. Patient verbalized understanding and expressed agreement with the plan.     Total face-to-face time: 25 minutes  More than 50% of face-to-face time was spent in counseling and coordinating care. Discussed anxiety associated with sexual intercourse.     Yessica Medeiros M.D.  04/09/18    This note was created using voice recognition software (Dragon). The accuracy of the dictation is limited by the abilities of the software. I have reviewed the note prior to signing, however some errors in grammar and  context are still possible. If you have any questions related to this note please do not hesitate to contact our office.

## 2018-04-23 ENCOUNTER — HOSPITAL ENCOUNTER (OUTPATIENT)
Dept: LAB | Facility: MEDICAL CENTER | Age: 33
End: 2018-04-23
Attending: INTERNAL MEDICINE
Payer: COMMERCIAL

## 2018-04-23 DIAGNOSIS — E03.9 HYPOTHYROIDISM (ACQUIRED): ICD-10-CM

## 2018-04-23 DIAGNOSIS — R79.89 HIGH SERUM LOW-DENSITY LIPOPROTEIN (LDL): ICD-10-CM

## 2018-04-23 DIAGNOSIS — F41.1 GENERALIZED ANXIETY DISORDER: ICD-10-CM

## 2018-04-23 DIAGNOSIS — L70.0 ACNE VULGARIS: ICD-10-CM

## 2018-04-23 LAB
ALBUMIN SERPL BCP-MCNC: 4.2 G/DL (ref 3.2–4.9)
ALBUMIN/GLOB SERPL: 1.3 G/DL
ALP SERPL-CCNC: 55 U/L (ref 30–99)
ALT SERPL-CCNC: 17 U/L (ref 2–50)
ANION GAP SERPL CALC-SCNC: 8 MMOL/L (ref 0–11.9)
AST SERPL-CCNC: 19 U/L (ref 12–45)
BASOPHILS # BLD AUTO: 0.3 % (ref 0–1.8)
BASOPHILS # BLD: 0.03 K/UL (ref 0–0.12)
BILIRUB SERPL-MCNC: 0.3 MG/DL (ref 0.1–1.5)
BUN SERPL-MCNC: 9 MG/DL (ref 8–22)
CALCIUM SERPL-MCNC: 8.6 MG/DL (ref 8.5–10.5)
CHLORIDE SERPL-SCNC: 106 MMOL/L (ref 96–112)
CHOLEST SERPL-MCNC: 173 MG/DL (ref 100–199)
CO2 SERPL-SCNC: 23 MMOL/L (ref 20–33)
CREAT SERPL-MCNC: 0.67 MG/DL (ref 0.5–1.4)
EOSINOPHIL # BLD AUTO: 0.22 K/UL (ref 0–0.51)
EOSINOPHIL NFR BLD: 2.4 % (ref 0–6.9)
ERYTHROCYTE [DISTWIDTH] IN BLOOD BY AUTOMATED COUNT: 40.7 FL (ref 35.9–50)
GLOBULIN SER CALC-MCNC: 3.3 G/DL (ref 1.9–3.5)
GLUCOSE SERPL-MCNC: 82 MG/DL (ref 65–99)
HCT VFR BLD AUTO: 44.5 % (ref 37–47)
HDLC SERPL-MCNC: 46 MG/DL
HGB BLD-MCNC: 14.4 G/DL (ref 12–16)
IMM GRANULOCYTES # BLD AUTO: 0.02 K/UL (ref 0–0.11)
IMM GRANULOCYTES NFR BLD AUTO: 0.2 % (ref 0–0.9)
LDLC SERPL CALC-MCNC: 98 MG/DL
LYMPHOCYTES # BLD AUTO: 2.99 K/UL (ref 1–4.8)
LYMPHOCYTES NFR BLD: 32.5 % (ref 22–41)
MCH RBC QN AUTO: 27.3 PG (ref 27–33)
MCHC RBC AUTO-ENTMCNC: 32.4 G/DL (ref 33.6–35)
MCV RBC AUTO: 84.4 FL (ref 81.4–97.8)
MONOCYTES # BLD AUTO: 0.55 K/UL (ref 0–0.85)
MONOCYTES NFR BLD AUTO: 6 % (ref 0–13.4)
NEUTROPHILS # BLD AUTO: 5.39 K/UL (ref 2–7.15)
NEUTROPHILS NFR BLD: 58.6 % (ref 44–72)
NRBC # BLD AUTO: 0 K/UL
NRBC BLD-RTO: 0 /100 WBC
PLATELET # BLD AUTO: 202 K/UL (ref 164–446)
PMV BLD AUTO: 14.6 FL (ref 9–12.9)
POTASSIUM SERPL-SCNC: 4.1 MMOL/L (ref 3.6–5.5)
PROT SERPL-MCNC: 7.5 G/DL (ref 6–8.2)
RBC # BLD AUTO: 5.27 M/UL (ref 4.2–5.4)
SODIUM SERPL-SCNC: 137 MMOL/L (ref 135–145)
T4 FREE SERPL-MCNC: 1.03 NG/DL (ref 0.53–1.43)
TRIGL SERPL-MCNC: 144 MG/DL (ref 0–149)
TSH SERPL DL<=0.005 MIU/L-ACNC: 4.14 UIU/ML (ref 0.38–5.33)
WBC # BLD AUTO: 9.2 K/UL (ref 4.8–10.8)

## 2018-04-23 PROCEDURE — 84439 ASSAY OF FREE THYROXINE: CPT

## 2018-04-23 PROCEDURE — 36415 COLL VENOUS BLD VENIPUNCTURE: CPT

## 2018-04-23 PROCEDURE — 80053 COMPREHEN METABOLIC PANEL: CPT

## 2018-04-23 PROCEDURE — 80061 LIPID PANEL: CPT

## 2018-04-23 PROCEDURE — 85025 COMPLETE CBC W/AUTO DIFF WBC: CPT

## 2018-04-23 PROCEDURE — 84443 ASSAY THYROID STIM HORMONE: CPT

## 2018-04-26 ENCOUNTER — OFFICE VISIT (OUTPATIENT)
Dept: MEDICAL GROUP | Age: 33
End: 2018-04-26
Payer: COMMERCIAL

## 2018-04-26 VITALS
WEIGHT: 145 LBS | HEIGHT: 60 IN | BODY MASS INDEX: 28.47 KG/M2 | HEART RATE: 78 BPM | DIASTOLIC BLOOD PRESSURE: 68 MMHG | TEMPERATURE: 97.8 F | SYSTOLIC BLOOD PRESSURE: 106 MMHG | OXYGEN SATURATION: 98 % | RESPIRATION RATE: 16 BRPM

## 2018-04-26 DIAGNOSIS — E03.9 HYPOTHYROIDISM (ACQUIRED): ICD-10-CM

## 2018-04-26 DIAGNOSIS — E28.2 PCOS (POLYCYSTIC OVARIAN SYNDROME): ICD-10-CM

## 2018-04-26 DIAGNOSIS — N97.0 INFERTILITY ASSOCIATED WITH ANOVULATION: ICD-10-CM

## 2018-04-26 DIAGNOSIS — F52.5 PSYCHOLOGIC VAGINISMUS: ICD-10-CM

## 2018-04-26 PROBLEM — N92.6 IRREGULAR MENSTRUAL CYCLE: Status: RESOLVED | Noted: 2017-03-23 | Resolved: 2018-04-26

## 2018-04-26 PROCEDURE — 99214 OFFICE O/P EST MOD 30 MIN: CPT | Performed by: INTERNAL MEDICINE

## 2018-04-27 NOTE — ASSESSMENT & PLAN NOTE
Patient follows with Dr. Medeiros, psychiatrist for her anxiety and psychologic vaginismus. She is taking Valium 5 mg only when she has sexual intercourse with her . She stated that she does not take Valium all the time. I reviewed the risks and benefits of Valium with patient and . She will cautiously take Valium and will use it rarely.

## 2018-04-27 NOTE — ASSESSMENT & PLAN NOTE
Patient follows with Dr. [facility] and Dr. Bull[facility] regularly. She is taking metformin 500 mg 2 tablets twice a day with meal as instructed by Dr. Armstrong. She stated that her menstrual cycle becomes regular after taking metformin and levothyroxine. She has menstruation once a month and last for 5-7 days. She stated that she has heavy flow and first 2 days. She takes iron supplement during her menstruation and takes prenatal vitamins daily. She has normal hemoglobin and hematocrit and normal CMP on 4/23/18.

## 2018-04-27 NOTE — ASSESSMENT & PLAN NOTE
Patient is taking levothyroxine 25 µg every morning. She stated that she is feeling better with current regimens. She denies side effects from taking levothyroxine. Her recent blood test showed normal CBC, CMP, and para vision test on 4/23/18. Her cholesterol level also improved. I reviewed blood test results with her and her  in clinic today.      Results for AUNG BLANCHARD (MRN [id number]) as of 4/26/2018 20:14   Ref. Range 4/23/2018 06:50   TSH Latest Ref Range: 0.380 - 5.330 uIU/mL 4.140   Free T-4 Latest Ref Range: 0.53 - 1.43 ng/dL 1.03     Results for AUNG BLANCHARD (MRN [id number]) as of 4/26/2018 20:14   Ref. Range 4/23/2018 06:50   Cholesterol,Tot Latest Ref Range: 100 - 199 mg/dL 173   Triglycerides Latest Ref Range: 0 - 149 mg/dL 144   HDL Latest Ref Range: >=40 mg/dL 46   LDL Latest Ref Range: <100 mg/dL 98

## 2018-04-27 NOTE — PROGRESS NOTES
Subjective:   Aung Santizo is a 32 y.o. female here today for evaluation and management of:      Hypothyroidism (acquired)  Patient is taking levothyroxine 25 µg every morning. She stated that she is feeling better with current regimens. She denies side effects from taking levothyroxine. Her recent blood test showed normal CBC, CMP, and para vision test on 4/23/18. Her cholesterol level also improved. I reviewed blood test results with her and her  in clinic today.      Results for AUNG SANTIZO (MRN [id number]) as of 4/26/2018 20:14   Ref. Range 4/23/2018 06:50   TSH Latest Ref Range: 0.380 - 5.330 uIU/mL 4.140   Free T-4 Latest Ref Range: 0.53 - 1.43 ng/dL 1.03     Results for AUNG SANTIZO (MRN [id number]) as of 4/26/2018 20:14   Ref. Range 4/23/2018 06:50   Cholesterol,Tot Latest Ref Range: 100 - 199 mg/dL 173   Triglycerides Latest Ref Range: 0 - 149 mg/dL 144   HDL Latest Ref Range: >=40 mg/dL 46   LDL Latest Ref Range: <100 mg/dL 98       PCOS (polycystic ovarian syndrome)  Patient follows with Dr. [facility] and Dr. [facility] regularly. She is taking metformin 500 mg 2 tablets twice a day with meal as instructed by Dr. Armstrong. She stated that her menstrual cycle becomes regular after taking metformin and levothyroxine. She has menstruation once a month and last for 5-7 days. She stated that she has heavy flow and first 2 days. She takes iron supplement during her menstruation and takes prenatal vitamins daily. She has normal hemoglobin and hematocrit and normal CMP on 4/23/18.    Psychologic vaginismus  Patient follows with Dr. Medeiros, psychiatrist for her anxiety and psychologic vaginismus. She is taking Valium 5 mg only when she has sexual intercourse with her . She stated that she does not take Valium all the time. I reviewed the risks and benefits of Valium with patient and . She will cautiously take Valium and will use it rarely.         Current medicines  (including changes today)  Current Outpatient Prescriptions   Medication Sig Dispense Refill   • metFORMIN (GLUCOPHAGE) 500 MG Tab Take 2 Tabs by mouth 2 times a day, with meals. 60 Tab 2   • diazepam (VALIUM) 5 MG Tab Take 1 Tab by mouth 1 time daily as needed for Anxiety for up to 30 days. 15 Tab 0   • levothyroxine (SYNTHROID) 25 MCG Tab TAKE 1 TAB BY MOUTH EVERY MORNING ON AN EMPTY STOMACH. 30 Tab 6   • Prenatal Multivit-Min-Fe-FA (PRENATAL VITAMINS PO) Take  by mouth.       No current facility-administered medications for this visit.      She  has a past medical history of Anxiety and Positive PPD. She also has no past medical history of Alcohol abuse; Alcoholism (CMS-HCC); Depression; Psychosis; or Substance abuse.    ROS   No chest pain, no shortness of breath, no abdominal pain       Objective:     Blood pressure 106/68, pulse 78, temperature 36.6 °C (97.8 °F), resp. rate 16, height 1.524 m (5'), weight 65.8 kg (145 lb), SpO2 98 %. Body mass index is 28.32 kg/m².   Physical Exam:  General: Alert, oriented and no acute distress.  Eye contact is good, speech goal directed, affect calm  HEENT: conjunctiva non-injected, sclera non-icteric.  Oral mucous membranes pink and moist with no lesions.  Pinna normal.   Lungs: Normal respiratory effort, clear to auscultation bilaterally with good excursion.  CV: regular rate and rhythm. No murmurs.   Abdomen: soft, non distended, nontender, Bowel sound normal.  Ext: no edema, color normal, vascularity normal, temperature normal        Assessment and Plan:   The following treatment plan was discussed     1. Hypothyroidism (acquired)  - Improved. Continue levothyroxine 25 µg one tablet every morning with empty stomach. Recheck last 6 months later.  - TSH; Future  - FREE THYROXINE; Future    2. PCOS (polycystic ovarian syndrome)  - Menstrual cycle improved with metformin. She will continue metformin 1000 mg twice a day as instructed by Dr. Armstrong, gynecologist.   -  metFORMIN (GLUCOPHAGE) 500 MG Tab; Take 2 Tabs by mouth 2 times a day, with meals.  Dispense: 60 Tab; Refill: 2    3. Infertility associated with anovulation  - Continue to follow with Dr. Armstrong in [facility].  - Discussed healthy diet and regular physical exercise to lose weight  - metFORMIN (GLUCOPHAGE) 500 MG Tab; Take 2 Tabs by mouth 2 times a day, with meals.  Dispense: 60 Tab; Refill: 2    4. Psychologic vaginismus  - Discussed for realization technique. She follows with Dr. Medeiros, psychiatrist. She is taking Valium when she has sexual intercourse with .   - Reviewed the potential side effects and risks of Valium with patient. She will cautiously use it.  - Advised not to drink alcohol with Valium. Advised not to drive when she takes Valium.      Followup: Return in about 6 months (around 10/26/2018), or if symptoms worsen or fail to improve, for hypothyroid, PCOS, anxiety, lab review.      Please note that this dictation was created using voice recognition software. I have made every reasonable attempt to correct obvious errors, but I expect that there may have unintended errors in text, spelling, punctuation, or grammar that I did not discover.

## 2018-07-23 ENCOUNTER — TELEPHONE (OUTPATIENT)
Dept: MEDICAL GROUP | Age: 33
End: 2018-07-23

## 2018-07-23 DIAGNOSIS — Z01.00 EYE EXAM, ROUTINE: ICD-10-CM

## 2018-09-11 DIAGNOSIS — E03.9 HYPOTHYROIDISM (ACQUIRED): ICD-10-CM

## 2018-09-11 RX ORDER — LEVOTHYROXINE SODIUM 0.03 MG/1
25 TABLET ORAL
Qty: 90 TAB | Refills: 3 | Status: SHIPPED | OUTPATIENT
Start: 2018-09-11 | End: 2019-07-18 | Stop reason: SDUPTHER

## 2018-10-06 ENCOUNTER — HOSPITAL ENCOUNTER (OUTPATIENT)
Dept: LAB | Facility: MEDICAL CENTER | Age: 33
End: 2018-10-06
Attending: INTERNAL MEDICINE
Payer: COMMERCIAL

## 2018-10-06 DIAGNOSIS — E03.9 HYPOTHYROIDISM (ACQUIRED): ICD-10-CM

## 2018-10-06 LAB
T4 FREE SERPL-MCNC: 1.02 NG/DL (ref 0.53–1.43)
TSH SERPL DL<=0.005 MIU/L-ACNC: 2.36 UIU/ML (ref 0.38–5.33)

## 2018-10-06 PROCEDURE — 84439 ASSAY OF FREE THYROXINE: CPT

## 2018-10-06 PROCEDURE — 84443 ASSAY THYROID STIM HORMONE: CPT

## 2018-10-06 PROCEDURE — 36415 COLL VENOUS BLD VENIPUNCTURE: CPT

## 2018-10-23 ENCOUNTER — OFFICE VISIT (OUTPATIENT)
Dept: MEDICAL GROUP | Age: 33
End: 2018-10-23
Payer: COMMERCIAL

## 2018-10-23 VITALS
TEMPERATURE: 97.2 F | HEIGHT: 60 IN | SYSTOLIC BLOOD PRESSURE: 104 MMHG | BODY MASS INDEX: 27.13 KG/M2 | DIASTOLIC BLOOD PRESSURE: 72 MMHG | WEIGHT: 138.2 LBS | HEART RATE: 83 BPM | OXYGEN SATURATION: 96 %

## 2018-10-23 DIAGNOSIS — F41.1 GENERALIZED ANXIETY DISORDER: ICD-10-CM

## 2018-10-23 DIAGNOSIS — E03.9 HYPOTHYROIDISM (ACQUIRED): ICD-10-CM

## 2018-10-23 DIAGNOSIS — Z23 NEED FOR VACCINATION: ICD-10-CM

## 2018-10-23 DIAGNOSIS — R79.89 HIGH SERUM LOW-DENSITY LIPOPROTEIN (LDL): ICD-10-CM

## 2018-10-23 DIAGNOSIS — E28.2 PCOS (POLYCYSTIC OVARIAN SYNDROME): ICD-10-CM

## 2018-10-23 PROCEDURE — 90686 IIV4 VACC NO PRSV 0.5 ML IM: CPT | Performed by: INTERNAL MEDICINE

## 2018-10-23 PROCEDURE — 99215 OFFICE O/P EST HI 40 MIN: CPT | Mod: 25 | Performed by: INTERNAL MEDICINE

## 2018-10-23 PROCEDURE — 90471 IMMUNIZATION ADMIN: CPT | Performed by: INTERNAL MEDICINE

## 2018-10-24 NOTE — ASSESSMENT & PLAN NOTE
Patient is taking metformin 500 mg 2 tablets twice a day.  Patient stated that her menstruation began small regular every 30-day and last for 5 days.  She reported that her ovulation test was also positive in the past after taking metformin.  Patient stated that she is not planning to have pregnancy yet.  However if she gets pregnant, she and her  also accept it.  She denies side effects from taking metformin.  Patient is also losing weight since last visit after taking metformin regularly.  However she stated that she is not able to do regular physical exercise due to her busy schedule.

## 2018-10-24 NOTE — PROGRESS NOTES
Subjective:   Aung Blanchard is a 33 y.o. female here today for evaluation and management of:      PCOS (polycystic ovarian syndrome)  Patient is taking metformin 500 mg 2 tablets twice a day.  Patient stated that her menstruation began small regular every 30-day and last for 5 days.  She reported that her ovulation test was also positive in the past after taking metformin.  Patient stated that she is not planning to have pregnancy yet.  However if she gets pregnant, she and her  also accept it.  She denies side effects from taking metformin.  Patient is also losing weight since last visit after taking metformin regularly.  However she stated that she is not able to do regular physical exercise due to her busy schedule.    Hypothyroidism (acquired)  Patient is taking levothyroxine 25 mcg every morning on empty stomach.  She denies side effects from taking it.  She still reports of feeling low mood but she does not have any other symptoms of hypothyroid.  Her thyroid function test is within normal.    Results for AUNG BLANCHARD (MRN [id number]) as of 10/23/2018 20:35   Ref. Range 10/6/2018 07:30   TSH Latest Ref Range: 0.380 - 5.330 uIU/mL 2.360   Free T-4 Latest Ref Range: 0.53 - 1.43 ng/dL 1.02       Generalized anxiety disorder  Patient still has mild depressed mood and anxiety.  She did not want to take SSRI or SNRI.  I have discussed with her to try Wellbutrin and discussed the risks and benefits of Wellbutrin with patient.  However patient declined to take it.  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  She stated that she is still having stress and anxiety.  Patient used to follow with [facility] for behavioral treatment but she was not able to follow instruction and participating in the treatment.  Patient was seen by Dr. Medeiros, psychiatrist in the past.  She stated that she wanted to follow-up with Dr. Medeiros and she will call to schedule with her for  follow-up.  She denied suicidal ideation or plan or homicidal ideation or plan.         Current medicines (including changes today)  Current Outpatient Prescriptions   Medication Sig Dispense Refill   • levothyroxine (SYNTHROID) 25 MCG Tab TAKE 1 TAB BY MOUTH EVERY MORNING ON AN EMPTY STOMACH. 90 Tab 3   • metFORMIN (GLUCOPHAGE) 500 MG Tab Take 2 Tabs by mouth 2 times a day, with meals. 60 Tab 2   • Prenatal Multivit-Min-Fe-FA (PRENATAL VITAMINS PO) Take  by mouth.       No current facility-administered medications for this visit.      She  has a past medical history of Anxiety and Positive PPD. She also has no past medical history of Alcohol abuse; Alcoholism (HCC); Depression; Psychosis (HCC); or Substance abuse (HCC).    ROS   No chest pain, no shortness of breath, no abdominal pain       Objective:     Blood pressure 104/72, pulse 83, temperature 36.2 °C (97.2 °F), temperature source Temporal, height 1.524 m (5'), weight 62.7 kg (138 lb 3.2 oz), last menstrual period 10/18/2018, SpO2 96 %, not currently breastfeeding. Body mass index is 26.99 kg/m².   Physical Exam:  General: Alert, oriented and no acute distress.  Eye contact is good, speech goal directed, affect calm  HEENT: conjunctiva non-injected, sclera non-icteric.  Oral mucous membranes pink and moist with no lesions.  Pinna normal.   Lungs: Normal respiratory effort, clear to auscultation bilaterally with good excursion.  CV: regular rate and rhythm. No murmurs.   Abdomen: soft, non distended, nontender, Bowel sound normal.  Ext: no edema, color normal, vascularity normal, temperature normal        Assessment and Plan:   The following treatment plan was discussed     1. Hypothyroidism (acquired)  - Well-controlled. Continue current regimens, levothyroxine 25 mcg 1 tab every morning on empty stomach. Recheck lab 1-2 weeks before next follow up visit.  Reviewed blood test result and discussed the side effects of medication with patient.  Answered all her  question and concern regarding her thyroid.  - TSH; Future  - FREE THYROXINE; Future    2. PCOS (polycystic ovarian syndrome)  - Stable.  Menstrual cycle improved after taking metformin regularly.  Patient also lost some weight from taking metformin.  She denies side effects from taking metformin.  She wanted to continue metformin.  Continue metformin 500 mg 2 tablets twice a day with meals.  I reviewed potential side effects of metformin with patient.  - CBC WITH DIFFERENTIAL; Future  - COMP METABOLIC PANEL; Future    3. Generalized anxiety disorder  - Not well controlled yet.  I discussed with patient how to cope with stress and anxiety.  She declined to take medication.  Patient also does not want to refer to psychologist.  She stated that she wants to follow with Dr. Medeiros, psychiatrist and she will call to make appointment with her.  - CBC WITH DIFFERENTIAL; Future  - COMP METABOLIC PANEL; Future    4. High serum low-density lipoprotein (LDL)  - Not on medication.  Continue to control with diet and exercise.  Advised to eat low fat, low carbohydrate and high fiber diet as well as do cardio physical exercise regularly.  - COMP METABOLIC PANEL; Future  - LIPID PROFILE; Future    5. Need for vaccination  - Influenza vaccine was given today after reviewing risks and benefits as well as side effects of vaccine.  - Influenza Vaccine Quad Injection >3Y (PF)    Face-to-face time spent 40 minutes with patient and more than half of that time spent for counseling how to cope stress, anxiety, reviewed her current medications, risks and benefits of her current medications, and cooperating of care for medical problems listed above.       Followup: Return in about 6 months (around 4/23/2019), or if symptoms worsen or fail to improve, for PCOS, Hyperlipidemia, Anxiety, Lab review, Hypothyroid.      Please note that this dictation was created using voice recognition software. I have made every reasonable attempt to correct  obvious errors, but I expect that there may have unintended errors in text, spelling, punctuation, or grammar that I did not discover.

## 2018-10-24 NOTE — ASSESSMENT & PLAN NOTE
Patient still has mild depressed mood and anxiety.  She did not want to take SSRI or SNRI.  I have discussed with her to try Wellbutrin and discussed the risks and benefits of Wellbutrin with patient.  However patient declined to take it.  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  She stated that she is still having stress and anxiety.  Patient used to follow with [facility], psychologist for behavioral treatment but she was not able to follow instruction and participating in the treatment.  Patient was seen by Dr. Medeiros, psychiatrist in the past.  She stated that she wanted to follow-up with Dr. Medeiros and she will call to schedule with her for follow-up.  She denied suicidal ideation or plan or homicidal ideation or plan.

## 2018-10-24 NOTE — ASSESSMENT & PLAN NOTE
Patient is taking levothyroxine 25 mcg every morning on empty stomach.  She denies side effects from taking it.  She still reports of feeling low mood but she does not have any other symptoms of hypothyroid.  Her thyroid function test is within normal.    Results for SANTO AUNG (MRN [id number]) as of 10/23/2018 20:35   Ref. Range 10/6/2018 07:30   TSH Latest Ref Range: 0.380 - 5.330 uIU/mL 2.360   Free T-4 Latest Ref Range: 0.53 - 1.43 ng/dL 1.02

## 2019-04-06 ENCOUNTER — HOSPITAL ENCOUNTER (OUTPATIENT)
Dept: LAB | Facility: MEDICAL CENTER | Age: 34
End: 2019-04-06
Attending: INTERNAL MEDICINE
Payer: COMMERCIAL

## 2019-04-06 DIAGNOSIS — R79.89 HIGH SERUM LOW-DENSITY LIPOPROTEIN (LDL): ICD-10-CM

## 2019-04-06 DIAGNOSIS — E28.2 PCOS (POLYCYSTIC OVARIAN SYNDROME): ICD-10-CM

## 2019-04-06 DIAGNOSIS — E03.9 HYPOTHYROIDISM (ACQUIRED): ICD-10-CM

## 2019-04-06 DIAGNOSIS — F41.1 GENERALIZED ANXIETY DISORDER: ICD-10-CM

## 2019-04-06 LAB
ALBUMIN SERPL BCP-MCNC: 4.6 G/DL (ref 3.2–4.9)
ALBUMIN/GLOB SERPL: 1.5 G/DL
ALP SERPL-CCNC: 50 U/L (ref 30–99)
ALT SERPL-CCNC: 16 U/L (ref 2–50)
ANION GAP SERPL CALC-SCNC: 7 MMOL/L (ref 0–11.9)
AST SERPL-CCNC: 19 U/L (ref 12–45)
BASOPHILS # BLD AUTO: 0.7 % (ref 0–1.8)
BASOPHILS # BLD: 0.05 K/UL (ref 0–0.12)
BILIRUB SERPL-MCNC: 0.4 MG/DL (ref 0.1–1.5)
BUN SERPL-MCNC: 7 MG/DL (ref 8–22)
CALCIUM SERPL-MCNC: 9.1 MG/DL (ref 8.5–10.5)
CHLORIDE SERPL-SCNC: 108 MMOL/L (ref 96–112)
CHOLEST SERPL-MCNC: 182 MG/DL (ref 100–199)
CO2 SERPL-SCNC: 24 MMOL/L (ref 20–33)
CREAT SERPL-MCNC: 0.69 MG/DL (ref 0.5–1.4)
EOSINOPHIL # BLD AUTO: 0.2 K/UL (ref 0–0.51)
EOSINOPHIL NFR BLD: 2.9 % (ref 0–6.9)
ERYTHROCYTE [DISTWIDTH] IN BLOOD BY AUTOMATED COUNT: 38.7 FL (ref 35.9–50)
FASTING STATUS PATIENT QL REPORTED: NORMAL
GLOBULIN SER CALC-MCNC: 3 G/DL (ref 1.9–3.5)
GLUCOSE SERPL-MCNC: 92 MG/DL (ref 65–99)
HCT VFR BLD AUTO: 47.2 % (ref 37–47)
HDLC SERPL-MCNC: 50 MG/DL
HGB BLD-MCNC: 15.1 G/DL (ref 12–16)
IMM GRANULOCYTES # BLD AUTO: 0.01 K/UL (ref 0–0.11)
IMM GRANULOCYTES NFR BLD AUTO: 0.1 % (ref 0–0.9)
LDLC SERPL CALC-MCNC: 113 MG/DL
LYMPHOCYTES # BLD AUTO: 2.49 K/UL (ref 1–4.8)
LYMPHOCYTES NFR BLD: 35.6 % (ref 22–41)
MCH RBC QN AUTO: 27.5 PG (ref 27–33)
MCHC RBC AUTO-ENTMCNC: 32 G/DL (ref 33.6–35)
MCV RBC AUTO: 85.8 FL (ref 81.4–97.8)
MONOCYTES # BLD AUTO: 0.36 K/UL (ref 0–0.85)
MONOCYTES NFR BLD AUTO: 5.1 % (ref 0–13.4)
NEUTROPHILS # BLD AUTO: 3.89 K/UL (ref 2–7.15)
NEUTROPHILS NFR BLD: 55.6 % (ref 44–72)
NRBC # BLD AUTO: 0 K/UL
NRBC BLD-RTO: 0 /100 WBC
PLATELET # BLD AUTO: 267 K/UL (ref 164–446)
PMV BLD AUTO: 12.5 FL (ref 9–12.9)
POTASSIUM SERPL-SCNC: 4.1 MMOL/L (ref 3.6–5.5)
PROT SERPL-MCNC: 7.6 G/DL (ref 6–8.2)
RBC # BLD AUTO: 5.5 M/UL (ref 4.2–5.4)
SODIUM SERPL-SCNC: 139 MMOL/L (ref 135–145)
T4 FREE SERPL-MCNC: 1.1 NG/DL (ref 0.53–1.43)
TRIGL SERPL-MCNC: 96 MG/DL (ref 0–149)
TSH SERPL DL<=0.005 MIU/L-ACNC: 2.59 UIU/ML (ref 0.38–5.33)
WBC # BLD AUTO: 7 K/UL (ref 4.8–10.8)

## 2019-04-06 PROCEDURE — 80053 COMPREHEN METABOLIC PANEL: CPT

## 2019-04-06 PROCEDURE — 84443 ASSAY THYROID STIM HORMONE: CPT

## 2019-04-06 PROCEDURE — 36415 COLL VENOUS BLD VENIPUNCTURE: CPT

## 2019-04-06 PROCEDURE — 84439 ASSAY OF FREE THYROXINE: CPT

## 2019-04-06 PROCEDURE — 80061 LIPID PANEL: CPT

## 2019-04-06 PROCEDURE — 85025 COMPLETE CBC W/AUTO DIFF WBC: CPT

## 2019-04-23 ENCOUNTER — OFFICE VISIT (OUTPATIENT)
Dept: MEDICAL GROUP | Age: 34
End: 2019-04-23
Payer: COMMERCIAL

## 2019-04-23 VITALS
BODY MASS INDEX: 26.39 KG/M2 | HEIGHT: 60 IN | SYSTOLIC BLOOD PRESSURE: 92 MMHG | WEIGHT: 134.4 LBS | TEMPERATURE: 98 F | DIASTOLIC BLOOD PRESSURE: 58 MMHG | HEART RATE: 73 BPM | OXYGEN SATURATION: 100 %

## 2019-04-23 DIAGNOSIS — E28.2 PCOS (POLYCYSTIC OVARIAN SYNDROME): ICD-10-CM

## 2019-04-23 DIAGNOSIS — R53.82 CHRONIC FATIGUE: ICD-10-CM

## 2019-04-23 DIAGNOSIS — E03.9 HYPOTHYROIDISM (ACQUIRED): ICD-10-CM

## 2019-04-23 PROCEDURE — 99214 OFFICE O/P EST MOD 30 MIN: CPT | Performed by: INTERNAL MEDICINE

## 2019-04-23 ASSESSMENT — PATIENT HEALTH QUESTIONNAIRE - PHQ9: CLINICAL INTERPRETATION OF PHQ2 SCORE: 0

## 2019-04-23 ASSESSMENT — PAIN SCALES - GENERAL: PAINLEVEL: NO PAIN

## 2019-04-24 NOTE — PROGRESS NOTES
Subjective:   Aung Blanchard is a 33 y.o. female here today for evaluation and management of:    Hypothyroidism (acquired)  Stable and well controlled with medication. Patient is taking Levothyroxine 25 MCG every morning on an empty stomach without side effects. I discussed the blood test with the patient in clinic today showing that she is not anemic and her thyroid levels are normal.      Results for AUNG BLANCHARD (MRN [id number]) as of 4/23/2019 17:39   Ref. Range 4/23/2018 06:50 10/6/2018 07:30 4/6/2019 07:23   TSH Latest Ref Range: 0.380 - 5.330 uIU/mL 4.140 2.360 2.590   Free T-4 Latest Ref Range: 0.53 - 1.43 ng/dL 1.03 1.02 1.10       PCOS (polycystic ovarian syndrome)  Patient is taking Metformin 500 MG 2 tablets twice a day as instructed by Dr. Hernandez. She denies side effects from medication but states that she has been losing weight rapidly over a short period of time. She is not exercising regularly. Patient expresses a lot of concerns with fertility and will follow-up with Dr. Hernandez. Patient is having menstruation regularly. Denies vaginal bleeding or vaginal discharge.  She requested to have pelvic ultrasound to evaluate her uterus and ovaries.    Chronic fatigue  Acute complaint. Patient states that she has been increasingly fatigued over the past 3-4 months. She goes to sleep at 10:30 PM and is awake at 4:30 AM the following morning. Patient states she wakes up at 4:30 AM to take her thyroid medication 1 hour prior to eating. She starts work at 7:30 AM and will start to feel very tired by the afternoon.  She has slightly elevated hematocrit at 47.2 on 4/6/19.  Patient's  reported that she has snoring when she sleeps on her back and she has less snoring or no snoring when she sleeps on her sides.  Patient denied headache.  She is not interested to do sleep study currently.  She agreed to have additional blood tests for fatigue work-up.      Current medicines (including changes  today)  Current Outpatient Prescriptions   Medication Sig Dispense Refill   • levothyroxine (SYNTHROID) 25 MCG Tab TAKE 1 TAB BY MOUTH EVERY MORNING ON AN EMPTY STOMACH. 90 Tab 3   • metFORMIN (GLUCOPHAGE) 500 MG Tab Take 2 Tabs by mouth 2 times a day, with meals. 60 Tab 2   • Prenatal Multivit-Min-Fe-FA (PRENATAL VITAMINS PO) Take  by mouth.       No current facility-administered medications for this visit.      She  has a past medical history of Anxiety and Positive PPD. She also has no past medical history of Alcohol abuse; Alcoholism (HCC); Depression; Psychosis (HCC); or Substance abuse (HCC).    ROS   No chest pain, no shortness of breath, no abdominal pain     Objective:     BP (!) 92/58 (BP Location: Right arm, Patient Position: Sitting, BP Cuff Size: Adult)   Pulse 73   Temp 36.7 °C (98 °F) (Temporal)   Ht 1.524 m (5')   Wt 61 kg (134 lb 6.4 oz)   SpO2 100%  Body mass index is 26.25 kg/m².      Physical Exam:  General: Alert, oriented and no acute distress.  Eye contact is good, speech goal directed, affect calm  HEENT: conjunctiva non-injected, sclera non-icteric.  Oral mucous membranes pink and moist with no lesions.  Pinna normal.  Neck: No supraclavicular, submandibular, submental lymphadenopathy or masses in the neck or supraclavicular regions.  Lungs: Normal respiratory effort, clear to auscultation bilaterally with good excursion.  CV: regular rate and rhythm. No murmurs.   Abdomen: soft, non distended, nontender, Bowel sound normal.  Ext: no edema, color normal, vascularity normal, temperature normal    Assessment and Plan:   The following treatment plan was discussed. Return in 6 weeks for follow-up.     1. Hypothyroidism (acquired)  - Well-controlled. Continue current regimens of Levothyroxine 25 MCG every morning on empty stomach.    2. PCOS (polycystic ovarian syndrome)  - Well-controlled. Continue current regimens of Metformin 1000 MG twice daily. Recheck lab 1-2 weeks before next follow  up visit.  - Advised that she take her metformin medication once at lunch and dinner.   - Continue prenatal vitamins.   - Follow-up with Dr. Hernandez, GYN, for PCOS and infertility .  Ordered US-pelvic transvaginal for further evaluation.   - US-PELVIC TRANSVAGINAL ONLY; Future    3. Chronic fatigue  - Discussed possibility of sleep apnea with the patient.   - Ordered for vitamin tests to screen for deficiency. Discussed option to try sleep study. She does not wish to proceed with the sleep study yet.   - Advised that she adjust her sleeping times and recommended that she sleep on her side and not on her back.   - Counseled on diet and walking exercises daily to lose weight. Advised stretching exercises to improve her fatigue.  - Continue to take prenatal vitamins once a day.  - VITAMIN D,25 HYDROXY; Future  - VITAMIN B12; Future  - FOLATE; Future  - IRON/TOTAL IRON BIND; Future  - FERRITIN; Future  - CBC WITH DIFFERENTIAL; Future      Followup: Return in about 6 weeks (around 6/4/2019), or if symptoms worsen or fail to improve, for Fatigue, hypothyroid, PCOS, lab review, ultrasound reviewed.      Please note that this dictation was created using voice recognition software. I have made every reasonable attempt to correct obvious errors, but I expect that there may have unintended errors in text, spelling, punctuation, or grammar that I did not discover.    I, Amber Houston (Mj), am scribing for, and in the presence of, Sandrine Pan M.D..    Electronically signed by: Amber Houston (Mj), 4/23/2019    I, Sandrine Pan M.D., personally performed the services described in this documentation, as scribed by Amber Houston in my presence, and it is both accurate and complete.

## 2019-05-02 ENCOUNTER — OFFICE VISIT (OUTPATIENT)
Dept: MEDICAL GROUP | Age: 34
End: 2019-05-02
Payer: COMMERCIAL

## 2019-05-02 VITALS
SYSTOLIC BLOOD PRESSURE: 100 MMHG | DIASTOLIC BLOOD PRESSURE: 62 MMHG | TEMPERATURE: 98.1 F | OXYGEN SATURATION: 97 % | BODY MASS INDEX: 26.42 KG/M2 | HEART RATE: 102 BPM | HEIGHT: 60 IN | WEIGHT: 134.6 LBS

## 2019-05-02 DIAGNOSIS — Z3A.01 LESS THAN 8 WEEKS GESTATION OF PREGNANCY: ICD-10-CM

## 2019-05-02 DIAGNOSIS — E03.9 HYPOTHYROIDISM (ACQUIRED): ICD-10-CM

## 2019-05-02 DIAGNOSIS — Z32.01 POSITIVE PREGNANCY TEST: ICD-10-CM

## 2019-05-02 LAB
INT CON NEG: NEGATIVE
INT CON POS: POSITIVE
POC URINE PREGNANCY TEST: POSITIVE

## 2019-05-02 PROCEDURE — 81025 URINE PREGNANCY TEST: CPT | Performed by: INTERNAL MEDICINE

## 2019-05-02 PROCEDURE — 99214 OFFICE O/P EST MOD 30 MIN: CPT | Performed by: INTERNAL MEDICINE

## 2019-05-02 ASSESSMENT — PAIN SCALES - GENERAL: PAINLEVEL: 4=SLIGHT-MODERATE PAIN

## 2019-05-02 NOTE — PROGRESS NOTES
Subjective:   Milli Blanchard is a 33 y.o. female here today for evaluation and management of:    Positive pregnancy test  Patient had a positive pregnancy test at home. She is taking prenatal vitamins. She is also taking Metformin 500 MG 2 tabs twice a day as instructed by Dr. Hernandez for PCOS and ovulation. I recommended she stopped taking Metformin now. She will follow up with Dr. Hernandez for her Metformin. She has an appointment on 5/23 for an ultrasound and would like a referral to see Dr. Arielle Montgomery with [facility]. LMP was 3/30/19. Patient confirms eating normally. Additionally, patient endorses low back pain, left sided abdominal pain, and mild nausea after eating.     Less than 8 weeks gestation of pregnancy  Patient had a positive POCT pregnancy test in clinic. Discussed with the patient that gestation, based on LMP, is 4 weeks and 5 days.     Hypothyroidism (acquired)  Patient is taking Levothyroxine 25 mcg once every morning on an empty stomach. She denies side effects from this medication and will continue taking the same dose.     Results for MILLI BLANCHARD (MRN [id number]) as of 5/2/2019 15:02   Ref. Range 10/6/2018 07:30 4/6/2019 07:23   TSH Latest Ref Range: 0.380 - 5.330 uIU/mL 2.360 2.590   Free T-4 Latest Ref Range: 0.53 - 1.43 ng/dL 1.02 1.10       Current medicines (including changes today)  Current Outpatient Prescriptions   Medication Sig Dispense Refill   • levothyroxine (SYNTHROID) 25 MCG Tab TAKE 1 TAB BY MOUTH EVERY MORNING ON AN EMPTY STOMACH. 90 Tab 3   • Prenatal Multivit-Min-Fe-FA (PRENATAL VITAMINS PO) Take  by mouth.       No current facility-administered medications for this visit.      She  has a past medical history of Anxiety and Positive PPD. She also has no past medical history of Alcohol abuse; Alcoholism (HCC); Depression; Psychosis (HCC); or Substance abuse (HCC).    ROS   No chest pain, no shortness of breath       Objective:     /62 (BP Location: Right arm,  Patient Position: Sitting, BP Cuff Size: Adult)   Pulse (!) 102   Temp 36.7 °C (98.1 °F) (Temporal)   Ht 1.524 m (5')   Wt 61.1 kg (134 lb 9.6 oz)   SpO2 97%  Body mass index is 26.29 kg/m².     Physical Exam:  General: Alert, oriented and no acute distress.  Eye contact is good, speech goal directed, affect calm  HEENT: conjunctiva non-injected, sclera non-icteric.  Oral mucous membranes pink and moist with no lesions.  Pinna normal.  Lungs: Normal respiratory effort, clear to auscultation bilaterally with good excursion.  CV: regular rate and rhythm. No murmurs.   Abdomen: soft, non distended, nontender, Bowel sound normal.  Ext: no edema, color normal, vascularity normal, temperature normal    Assessment and Plan:   The following treatment plan was discussed; follow-up in 3 months    1. Positive pregnancy test  - Recommended patient stop metformin immediately and follow up with Dr. Hernandez. She will continue taking prenatal vitamins.   - Discussed risk of possible gestational diabetes. She has no history of diabetes. Continue to watch blood sugars throughout her pregnancy.  - Will refer to Dr. Arielle Montgomery with [facility]. She will keep her appointment with OBGYN for 5/23 for her ultrasound.  - Advised patient to avoid beans and excess amount of green tea.   - Advised patient to take TUMS as needed for indigestion and drink prune juice if she becomes constipated.   - Counseled on balanced diet: carbs, protein, and fiber. Increase water intake. Increase amount of fiber. Avoid excess amount of sweets and carbohydrates. Continue physical exercise such as walking.   - Encouraged using Tylenol 500 mg once a day or twice a day as needed and a heating pad for her back pain.   - POCT Pregnancy  - REFERRAL TO OB/GYN    2. Less than 8 weeks gestation of pregnancy  - See 1 above.    3. Hypothyroidism (acquired)  - Stable. She will continue same dose of levothyroxine. Recheck at second trimester.  - TSH;  Future  - FREE THYROXINE; Future    4. Health Maintenance   - Patient is due for Tdap vaccine.  She will discuss with her gynecologist before receiving Tdap vaccine.      Followup: Return in about 3 months (around 8/2/2019), or if symptoms worsen or fail to improve, for Hypothyroid, Anxiety, Lab review.      Please note that this dictation was created using voice recognition software. I have made every reasonable attempt to correct obvious errors, but I expect that there may have unintended errors in text, spelling, punctuation, or grammar that I did not discover.    I, Amber Houston (Jorgitoibe), am scribing for, and in the presence of, Sandrine Pan M.D..    Electronically signed by: Amber Houston (Mj), 5/2/2019    I, Sandrine Pan M.D., personally performed the services described in this documentation, as scribed by Amber Houston in my presence, and it is both accurate and complete.

## 2019-05-10 NOTE — MR AVS SNAPSHOT
Milli Santizo   2017 10:40 AM   Office Visit   MRN: [id number]    Department:  [address] [place] [address]   Dept Phone:  [phone]    Description:  Female : 1985   Provider:  Sandrine Pan M.D.           Reason for Visit     Leg Pain x6 days, started from thigh      Allergies as of 2017     No Known Allergies      You were diagnosed with     Meralgia paresthetica of right side   [[id number]]         Vital Signs     Blood Pressure Pulse Temperature Height Weight Body Mass Index    110/78 mmHg 84 36.3 °C (97.3 °F) 1.524 m (5') 66.225 kg (146 lb) 28.51 kg/m2    Oxygen Saturation Last Menstrual Period Breastfeeding? Smoking Status          97% 2017 No Never Smoker         Basic Information     Date Of Birth Sex Race Ethnicity Preferred Language    1985 Female Other Non- English      Your appointments     2017  3:20 PM   Established Patient with Sandrine Pan M.D.   [address] [id number] [phone]           You will be receiving a confirmation call a few days before your appointment from our automated call confirmation system.            Aug 24, 2017  2:00 PM   Individual Therapy with Meaghan Hernandez P.H.D.   [facility] [address] [place] [address])    [address] [place] [address]  [facility] [address]   [phone]            Sep 06, 2017  4:00 PM   Individual Therapy with Meaghan Hernandez P.H.D.   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 Patient attempts to pull off eeg leads and corepak.  No s/s injury at this time menstrual cycle N92.6   3/23/2017 - Present    Elevated TSH R94.6   4/5/2017 - Present    Acne vulgaris L70.0   4/5/2017 - Present    Meralgia paresthetica of right side G57.11   6/14/2017 - Present      Health Maintenance        Date Due Completion Dates    IMM DTaP/Tdap/Td Vaccine (1 - Tdap) 6/27/2006 ---    PAP SMEAR 6/27/2008 ---            Current Immunizations     HPV Quadrivalent Vaccine (GARDASIL) 11/20/2013  2:45 PM    Influenza TIV (IM) 10/21/2013, 1/25/2013    Influenza Vaccine Quad Inj (Preserved) 10/18/2016    MMR Vaccine 12/13/2012, 11/13/2012  3:15 PM    Tuberculin Skin Test 4/1/2011      Below and/or attached are the medications your provider expects you to take. Review all of your home medications and newly ordered medications with your provider and/or pharmacist. Follow medication instructions as directed by your provider and/or pharmacist. Please keep your medication list with you and share with your provider. Update the information when medications are discontinued, doses are changed, or new medications (including over-the-counter products) are added; and carry medication information at all times in the event of emergency situations     Allergies:  No Known Allergies          Medications  Valid as of: June 14, 2017 - 11:30 AM    Generic Name Brand Name Tablet Size Instructions for use    Benzoyl Peroxide (Liquid) Benzoyl Peroxide 10 % 1 Application by Apply externally route 2 times a day.        Diclofenac Sodium (Gel) Diclofenac Sodium 1 % Apply 4 gram on affected thigh twice a day as needed.        Prenatal Multivit-Min-Fe-FA   Take  by mouth.        .                 Medicines prescribed today were sent to:     [facility] [place] - [address] [id number] [facility] [place] [address]    Phone: [phone] Fax: [phone]    Open 24 Hours?: No      Medication refill instructions:       If your prescription bottle indicates you have medication refills left, it is not  necessary to call your provider’s office. Please contact your pharmacy and they will refill your medication.    If your prescription bottle indicates you do not have any refills left, you may request refills at any time through one of the following ways: The online [employer] system (except Urgent Care), by calling your provider’s office, or by asking your pharmacy to contact your provider’s office with a refill request. Medication refills are processed only during regular business hours and may not be available until the next business day. Your provider may request additional information or to have a follow-up visit with you prior to refilling your medication.   *Please Note: Medication refills are assigned a new Rx number when refilled electronically. Your pharmacy may indicate that no refills were authorized even though a new prescription for the same medication is available at the pharmacy. Please request the medicine by name with the pharmacy before contacting your provider for a refill.        Instructions    Meralgia Paresthetica   Meralgia paresthetica (MP) is a disorder characterized by tingling, numbness, and burning pain in the outer side of the thigh. It occurs in men more than women. MP is generally found in middle-aged or overweight people. Sometimes, the disorder may disappear.  CAUSES  The disorder is caused by a nerve in the thigh being squeezed (compressed). MP may be associated with tight clothing, pregnancy, diabetes, and being overweight (obese).  SYMPTOMS  · Tingling, numbness, and burning in the outer thigh.   · An area of the skin may be painful and sensitive to the touch.   The symptoms often worsen after walking or standing.  TREATMENT   Treatment is based on your symptoms and is mainly supportive. Treatment may include:  · Wearing looser clothing.   · Losing weight.   · Avoiding prolonged standing or walking.   · Taking medication.   · Surgery if the pain is peristent or severe.   MP usually  eases or disappears after treatment. Surgery is not always fully successful.  Document Released: 12/08/2003 Document Revised: 03/11/2013 Document Reviewed: 12/18/2006  [employer]® Patient Information ©2013 [employer].          MyChart Access Code: Activation code not generated  Current [employer] Status: Active

## 2019-06-20 ENCOUNTER — HOSPITAL ENCOUNTER (OUTPATIENT)
Dept: LAB | Facility: MEDICAL CENTER | Age: 34
End: 2019-06-20
Attending: OBSTETRICS & GYNECOLOGY
Payer: COMMERCIAL

## 2019-06-20 PROCEDURE — 81508 FTL CGEN ABNOR TWO PROTEINS: CPT

## 2019-06-24 LAB
# FETUSES US: NORMAL
AGE - REPORTED: 32.5 YR
COLLECT DATE: NORMAL
CURRENT SMOKER: NO
FET CRL US.MEAS: 51.6 MM
FET CRL US.MEAS: NORMAL MM
FET NUCHAL FOLD MOM THICKNESS US.MEAS: 1.13
FET NUCHAL FOLD MOM THICKNESS US.MEAS: NORMAL
FET NUCHAL FOLD THICKNESS US.MEAS: 1.3 MM
GA: NORMAL WK
HCG MOM SERPL: 3.44
HCG SERPL-ACNC: NORMAL IU/L
HX OF HEREDITARY DISORDERS: NO
INTEGRATED SCN PATIENT-IMP: NORMAL
NUCHAL TRANSLUCENCY (NT), TWIN B Q0252: NORMAL MM
PAPP-A MOM SERPL: 1.19
PAPP-A SERPL-MCNC: 837.4 NG/ML
PATHOLOGY STUDY: NORMAL
SONOGRAPHER NAME: NORMAL
SONOGRAPHER: NORMAL
SPECIMEN DRAWN SERPL: NORMAL
US DATE: NORMAL

## 2019-07-03 ENCOUNTER — HOSPITAL ENCOUNTER (OUTPATIENT)
Dept: RADIOLOGY | Facility: MEDICAL CENTER | Age: 34
End: 2019-07-03
Attending: PHYSICIAN ASSISTANT
Payer: COMMERCIAL

## 2019-07-03 ENCOUNTER — OFFICE VISIT (OUTPATIENT)
Dept: URGENT CARE | Facility: PHYSICIAN GROUP | Age: 34
End: 2019-07-03
Payer: COMMERCIAL

## 2019-07-03 VITALS
RESPIRATION RATE: 18 BRPM | HEIGHT: 60 IN | TEMPERATURE: 97.4 F | OXYGEN SATURATION: 97 % | DIASTOLIC BLOOD PRESSURE: 82 MMHG | HEART RATE: 107 BPM | BODY MASS INDEX: 27.29 KG/M2 | SYSTOLIC BLOOD PRESSURE: 122 MMHG | WEIGHT: 139 LBS

## 2019-07-03 DIAGNOSIS — M25.562 ACUTE PAIN OF LEFT KNEE: ICD-10-CM

## 2019-07-03 DIAGNOSIS — S83.92XA SPRAIN OF LEFT KNEE, UNSPECIFIED LIGAMENT, INITIAL ENCOUNTER: ICD-10-CM

## 2019-07-03 DIAGNOSIS — M79.89 PAIN AND SWELLING OF LEFT LOWER LEG: ICD-10-CM

## 2019-07-03 DIAGNOSIS — M79.662 PAIN AND SWELLING OF LEFT LOWER LEG: ICD-10-CM

## 2019-07-03 PROCEDURE — 99214 OFFICE O/P EST MOD 30 MIN: CPT | Performed by: PHYSICIAN ASSISTANT

## 2019-07-03 PROCEDURE — 93971 EXTREMITY STUDY: CPT | Mod: LT

## 2019-07-03 ASSESSMENT — ENCOUNTER SYMPTOMS
FEVER: 0
CHILLS: 0
VOMITING: 0
WEAKNESS: 0
NAUSEA: 0

## 2019-07-03 NOTE — PROGRESS NOTES
Subjective:   Milli Santizo is a 34 y.o. female who presents for Knee Pain (L knee pain/[id number] )        Moved on July 1. Was performing a lot of heavy lifting. Pain started on June 30.  Pain is mild in the morning, but worse at night. Improves throughout the day.  13 weeks pregnant.  Last dose of tylenol was last night.      Knee Pain   This is a new problem. The current episode started in the past 7 days. The problem occurs constantly. The problem has been waxing and waning. Pertinent negatives include no chills, fever, nausea, vomiting or weakness. The symptoms are aggravated by bending and stress. She has tried acetaminophen (tiger balm and vicks) for the symptoms. The treatment provided mild relief.     Review of Systems   Constitutional: Negative for chills and fever.   Gastrointestinal: Negative for nausea and vomiting.   Neurological: Negative for weakness.       PMH:  has a past medical history of Anxiety and Positive PPD. She also has no past medical history of Alcohol abuse; Alcoholism ([facility]); Depression; Psychosis ([facility]); or Substance abuse ([facility]).  MEDS:   Current Outpatient Prescriptions:   •  levothyroxine (SYNTHROID) 25 MCG Tab, TAKE 1 TAB BY MOUTH EVERY MORNING ON AN EMPTY STOMACH., Disp: 90 Tab, Rfl: 3  •  Prenatal Multivit-Min-Fe-FA (PRENATAL VITAMINS PO), Take  by mouth., Disp: , Rfl:   ALLERGIES: No Known Allergies  SURGHX: No past surgical history on file.  SOCHX:  reports that she has never smoked. She has never used smokeless tobacco. She reports that she drinks about 0.6 oz of alcohol per week . She reports that she does not use drugs.  FH: Family history was reviewed, no pertinent findings to report   Objective:   /82   Pulse (!) 107   Temp 36.3 °C (97.4 °F) (Temporal)   Resp 18   Ht 1.524 m (5')   Wt 63 kg (139 lb)   LMP 03/30/2019 (Exact Date)   SpO2 97%   Breastfeeding? No   BMI 27.15 kg/m²   Physical Exam   Constitutional: She is oriented to person, place, and  time. She appears well-developed and well-nourished.  Non-toxic appearance. No distress.   HENT:   Head: Normocephalic and atraumatic.   Right Ear: External ear normal.   Left Ear: External ear normal.   Nose: Nose normal.   Neck: Neck supple.   Cardiovascular: Regular rhythm.  Tachycardia present.    Pulses:       Dorsalis pedis pulses are 2+ on the left side.        Posterior tibial pulses are 2+ on the left side.   Pulmonary/Chest: Effort normal. No respiratory distress.   Musculoskeletal:        Left knee: She exhibits swelling. She exhibits normal range of motion, no ecchymosis, no deformity and normal patellar mobility.   Mild-mod edemas of left knee. It is unclear if there is an effusion.  Stable to varus and valgus stresses at 0 and 15 degrees.  Extension to 0 flexion 160.  Negative anterior drawer.  Negative posterior drawer.  Negative Tahmina's.  Joint lines, patella, MCL, LCL nontender to palpation.  Patellar tendon and quadriceps tendon moderately tender to palpation.  Skin is atraumatic.  No erythema, ecchymosis, or warmth.   Neurological: She is alert and oriented to person, place, and time. No cranial nerve deficit or sensory deficit.   Neurovascularly intact distally.   Skin: Skin is warm and dry. Capillary refill takes less than 2 seconds.   Psychiatric: She has a normal mood and affect. Her speech is normal and behavior is normal. Judgment and thought content normal. Cognition and memory are normal.   Vitals reviewed.        Assessment/Plan:   1. Pain and swelling of left lower leg  - US-EXTREMITY VENOUS LOWER UNILAT LEFT; Future    2. Acute pain of left knee  - US-EXTREMITY VENOUS LOWER UNILAT LEFT; Future    3. Sprain of left knee, unspecified ligament, initial encounter    Due to patient's pregnancy status I advised against radiological imaging at this time.  Due to diffuse swelling and tenderness to palpation I will obtain an ultrasound to rule out DVT.    US:  FINDINGS:    REAL-TIME  GRAY-SCALE IMAGING:  Real-time gray-scale imaging reveals no evidence of focal wall thickening.    COLOR AND DUPLEX DOPPLER IMAGING:  There is no evidence of luminal thrombus.  There is normal augmentation to flow and respiratory variation.      Impression       No evidence of left lower extremity deep venous thrombosis.     Ultrasound negative for DVT.  Patient was contacted with the results.  I suspect that patient's symptoms are musculoskeletal in nature.  Due to pregnancy status  I recommend against oral and topical anti-inflammatories.  Patient instructed to avoid heavy lifting and other aggravating activities.  Elevate and ice several times a day.  If symptoms worsen or new symptoms develop patient instructed to see PCP or return to clinic for reevaluation.  Patient to follow-up with PCP in 7 to 10 days.    Differential diagnosis, natural history, supportive care, and indications for immediate follow-up discussed.

## 2019-07-18 DIAGNOSIS — E03.9 HYPOTHYROIDISM (ACQUIRED): ICD-10-CM

## 2019-07-18 RX ORDER — LEVOTHYROXINE SODIUM 0.03 MG/1
25 TABLET ORAL
Qty: 90 TAB | Refills: 3 | Status: SHIPPED | OUTPATIENT
Start: 2019-07-18 | End: 2019-10-29 | Stop reason: SDUPTHER

## 2019-07-24 ENCOUNTER — HOSPITAL ENCOUNTER (OUTPATIENT)
Dept: LAB | Facility: MEDICAL CENTER | Age: 34
End: 2019-07-24
Attending: OBSTETRICS & GYNECOLOGY
Payer: COMMERCIAL

## 2019-07-24 LAB
ABO GROUP BLD: NORMAL
ANISOCYTOSIS BLD QL SMEAR: ABNORMAL
BASOPHILS # BLD AUTO: 0.5 % (ref 0–1.8)
BASOPHILS # BLD: 0.06 K/UL (ref 0–0.12)
BLD GP AB SCN SERPL QL: NORMAL
COMMENT 1642: NORMAL
EOSINOPHIL # BLD AUTO: 0.24 K/UL (ref 0–0.51)
EOSINOPHIL NFR BLD: 1.9 % (ref 0–6.9)
ERYTHROCYTE [DISTWIDTH] IN BLOOD BY AUTOMATED COUNT: 48.1 FL (ref 35.9–50)
HCT VFR BLD AUTO: 36.5 % (ref 37–47)
HGB BLD-MCNC: 10.7 G/DL (ref 12–16)
IMM GRANULOCYTES # BLD AUTO: 0.07 K/UL (ref 0–0.11)
IMM GRANULOCYTES NFR BLD AUTO: 0.6 % (ref 0–0.9)
LYMPHOCYTES # BLD AUTO: 2.41 K/UL (ref 1–4.8)
LYMPHOCYTES NFR BLD: 19.2 % (ref 22–41)
MCH RBC QN AUTO: 27 PG (ref 27–33)
MCHC RBC AUTO-ENTMCNC: 29.3 G/DL (ref 33.6–35)
MCV RBC AUTO: 91.9 FL (ref 81.4–97.8)
MONOCYTES # BLD AUTO: 0.81 K/UL (ref 0–0.85)
MONOCYTES NFR BLD AUTO: 6.5 % (ref 0–13.4)
MORPHOLOGY BLD-IMP: NORMAL
NEUTROPHILS # BLD AUTO: 8.95 K/UL (ref 2–7.15)
NEUTROPHILS NFR BLD: 71.3 % (ref 44–72)
NRBC # BLD AUTO: 0 K/UL
NRBC BLD-RTO: 0 /100 WBC
PLATELET # BLD AUTO: 263 K/UL (ref 164–446)
PLATELET BLD QL SMEAR: NORMAL
PMV BLD AUTO: 12.8 FL (ref 9–12.9)
RBC # BLD AUTO: 3.97 M/UL (ref 4.2–5.4)
RBC BLD AUTO: PRESENT
RH BLD: NORMAL
WBC # BLD AUTO: 12.5 K/UL (ref 4.8–10.8)

## 2019-07-24 PROCEDURE — 87340 HEPATITIS B SURFACE AG IA: CPT

## 2019-07-24 PROCEDURE — 86901 BLOOD TYPING SEROLOGIC RH(D): CPT

## 2019-07-24 PROCEDURE — 36415 COLL VENOUS BLD VENIPUNCTURE: CPT

## 2019-07-24 PROCEDURE — 86850 RBC ANTIBODY SCREEN: CPT

## 2019-07-24 PROCEDURE — 87086 URINE CULTURE/COLONY COUNT: CPT

## 2019-07-24 PROCEDURE — 86900 BLOOD TYPING SEROLOGIC ABO: CPT

## 2019-07-24 PROCEDURE — 81511 FTL CGEN ABNOR FOUR ANAL: CPT

## 2019-07-24 PROCEDURE — 85025 COMPLETE CBC W/AUTO DIFF WBC: CPT

## 2019-07-24 PROCEDURE — 86762 RUBELLA ANTIBODY: CPT

## 2019-07-24 PROCEDURE — 82306 VITAMIN D 25 HYDROXY: CPT

## 2019-07-24 PROCEDURE — 84443 ASSAY THYROID STIM HORMONE: CPT

## 2019-07-24 PROCEDURE — 84439 ASSAY OF FREE THYROXINE: CPT

## 2019-07-24 PROCEDURE — 87389 HIV-1 AG W/HIV-1&-2 AB AG IA: CPT

## 2019-07-24 PROCEDURE — 86787 VARICELLA-ZOSTER ANTIBODY: CPT

## 2019-07-24 PROCEDURE — 86780 TREPONEMA PALLIDUM: CPT

## 2019-07-25 LAB
25(OH)D3 SERPL-MCNC: 17 NG/ML (ref 30–100)
HBV SURFACE AG SER QL: NEGATIVE
HIV 1+2 AB+HIV1 P24 AG SERPL QL IA: NON REACTIVE
RUBV AB SER QL: 130.7 IU/ML
T4 FREE SERPL-MCNC: 0.74 NG/DL (ref 0.53–1.43)
TREPONEMA PALLIDUM IGG+IGM AB [PRESENCE] IN SERUM OR PLASMA BY IMMUNOASSAY: NON REACTIVE
TSH SERPL DL<=0.005 MIU/L-ACNC: 3.15 UIU/ML (ref 0.38–5.33)

## 2019-07-26 LAB
BACTERIA UR CULT: NORMAL
SIGNIFICANT IND 70042: NORMAL
SITE SITE: NORMAL
SOURCE SOURCE: NORMAL
VZV IGG SER IA-ACNC: 1.49

## 2019-07-29 LAB
# FETUSES US: NORMAL
AFP MOM SERPL: 1.26
AFP SERPL-MCNC: 47 NG/ML
AGE - REPORTED: 32.5 YR
CURRENT SMOKER: NO
FAMILY MEMBER DISEASES HX: NO
FET CRL US.MEAS: 51.6 MM
FET CRL US.MEAS: NORMAL MM
FET NUCHAL FOLD MOM THICKNESS US.MEAS: 1.13
FET NUCHAL FOLD MOM THICKNESS US.MEAS: NORMAL
FET NUCHAL FOLD THICKNESS US.MEAS: 1.3 MM
GA: NORMAL WK
HCG MOM SERPL: 2.97
HCG SERPL-ACNC: NORMAL IU/L
HX OF HEREDITARY DISORDERS: NO
IDDM PATIENT QL: NO
INHIBIN A MOM SERPL: 2.33
INHIBIN A SERPL-MCNC: 393 PG/ML
INTEGRATED SCN PATIENT-IMP: NORMAL
NUCHAL TRANSLUCENCY (NT), TWIN B Q0252: NORMAL MM
PAPP-A MOM SERPL: 1.19
PAPP-A SERPL-MCNC: 837.4 NG/ML
PATHOLOGY STUDY: NORMAL
SONOGRAPHER NAME: NORMAL
SONOGRAPHER: NORMAL
SPECIMEN DRAWN SERPL: NORMAL
U ESTRIOL MOM SERPL: 1.26
U ESTRIOL SERPL-MCNC: 1.33 NG/ML
US DATE: NORMAL

## 2019-08-08 ENCOUNTER — OFFICE VISIT (OUTPATIENT)
Dept: MEDICAL GROUP | Age: 34
End: 2019-08-08
Payer: COMMERCIAL

## 2019-08-08 VITALS
HEART RATE: 104 BPM | SYSTOLIC BLOOD PRESSURE: 102 MMHG | OXYGEN SATURATION: 97 % | HEIGHT: 60 IN | BODY MASS INDEX: 29.41 KG/M2 | TEMPERATURE: 99 F | DIASTOLIC BLOOD PRESSURE: 68 MMHG | WEIGHT: 149.8 LBS

## 2019-08-08 DIAGNOSIS — E03.9 HYPOTHYROIDISM (ACQUIRED): ICD-10-CM

## 2019-08-08 DIAGNOSIS — D64.9 NORMOCYTIC ANEMIA: ICD-10-CM

## 2019-08-08 DIAGNOSIS — E55.9 VITAMIN D INSUFFICIENCY: ICD-10-CM

## 2019-08-08 DIAGNOSIS — K59.01 SLOW TRANSIT CONSTIPATION: ICD-10-CM

## 2019-08-08 PROCEDURE — 99214 OFFICE O/P EST MOD 30 MIN: CPT | Performed by: INTERNAL MEDICINE

## 2019-08-08 RX ORDER — ONDANSETRON HYDROCHLORIDE 8 MG/1
8 TABLET, FILM COATED ORAL
Refills: 3 | COMMUNITY
Start: 2019-07-12 | End: 2019-10-24

## 2019-08-08 ASSESSMENT — PAIN SCALES - GENERAL: PAINLEVEL: 5=MODERATE PAIN

## 2019-08-08 NOTE — PROGRESS NOTES
Subjective:   Aung Blanchard is a 34 y.o. female here today for evaluation and management of:    Slow transit constipation  Patient is 18 weeks pregnancy. She is being followed by gynecology and has a normal pregnancy. She presents today with a new complaint of intermittent rectal pain near her anus. She describes her pain as a pinching. She reports that she is having constipation but denies hemorrhoid or rectal bleeding. She has been using Metamucil for the past week, approved by her gynecologist. She admits that she does eat some spicy foods and does not drink enough water.  Patient has a follow up appointment with her gynecologist tomorrow. Denies hematochezia.    Hypothyroidism (acquired)  Patient is taking Levothyroxine 25 mcg once every morning on an empty stomach. She denies side effects from taking it. She would like to increase her dose. I have discussed the risks of increasing the dose with her. Discussed plan to continue taking same dose during the week and increase dose to 50 mcg Saturday and Sunday. She is agreeable with this plan but will consult with her gynecologist tomorrow. I discussed the blood test with the patient in clinic today indicating that her thyroid function tests are within normal.    Results for AUNG BLANCHARD (MRN [id number]) as of 8/8/2019 15:00   Ref. Range 7/24/2019 12:40   TSH Latest Ref Range: 0.380 - 5.330 uIU/mL 3.150   Free T-4 Latest Ref Range: 0.53 - 1.43 ng/dL 0.74       Normocytic anemia  I discussed the blood test with the patient in clinic today indicating that she is anemic. Her white cell count is slightly elevated but not indicative of infection.  She does not have any evidence of infection.  She also denies abdominal pain or black tarry stool or bloody bowel movement or hematuria.  Patient states that she is taking prenatal vitamins.  Patient states that her OB/GYN checked her anemia and plans to discuss treatment tomorrow.    Results for AUNG BLANCHARD (MRN  "[id number]) as of 8/8/2019 15:00   Ref. Range 7/24/2019 12:40   WBC Latest Ref Range: 4.8 - 10.8 K/uL 12.5 (H)   RBC Latest Ref Range: 4.20 - 5.40 M/uL 3.97 (L)   Hemoglobin Latest Ref Range: 12.0 - 16.0 g/dL 10.7 (L)   Hematocrit Latest Ref Range: 37.0 - 47.0 % 36.5 (L)   MCV Latest Ref Range: 81.4 - 97.8 fL 91.9   MCH Latest Ref Range: 27.0 - 33.0 pg 27.0   MCHC Latest Ref Range: 33.6 - 35.0 g/dL 29.3 (L)   RDW Latest Ref Range: 35.9 - 50.0 fL 48.1   Platelet Count Latest Ref Range: 164 - 446 K/uL 263   MPV Latest Ref Range: 9.0 - 12.9 fL 12.8       Vitamin D insufficiency  Patient has low vitamin D at 17, per recent labs on 7/24/19. Recommended that she take Vitamin D supplements, 2000 units daily however she will confirm with her gynecologist tomorrow.    Results for AUNG BLANCHARD (MRN [id number]) as of 8/8/2019 15:00   Ref. Range 7/24/2019 12:40   25-Hydroxy   Vitamin D 25 Latest Ref Range: 30 - 100 ng/mL 17 (L)       Current medicines (including changes today)  Current Outpatient Medications   Medication Sig Dispense Refill   • ondansetron (ZOFRAN) 8 MG Tab Take 8 mg by mouth.  3   • levothyroxine (SYNTHROID) 25 MCG Tab TAKE 1 TAB BY MOUTH EVERY MORNING ON AN EMPTY STOMACH. 90 Tab 3   • Prenatal Multivit-Min-Fe-FA (PRENATAL VITAMINS PO) Take  by mouth.       No current facility-administered medications for this visit.      She  has a past medical history of Anxiety and Positive PPD. She also has no past medical history of Alcohol abuse, Alcoholism (HCC), Depression, Psychosis (HCC), or Substance abuse (HCC).    ROS   No chest pain, no shortness of breath, no abdominal pain       Objective:     /68 (BP Location: Right arm, Patient Position: Sitting, BP Cuff Size: Adult)   Pulse (!) 104   Temp 37.2 °C (99 °F) (Temporal)   Ht 1.517 m (4' 11.72\")   Wt 67.9 kg (149 lb 12.8 oz)   SpO2 97%  Body mass index is 29.53 kg/m².     Physical Exam:  General: Alert, oriented and no acute distress.  Eye contact is " good, speech goal directed, affect calm  HEENT: conjunctiva non-injected, sclera non-icteric.  Oral mucous membranes pink and moist with no lesions.  Pinna normal.  Lungs: Normal respiratory effort, clear to auscultation bilaterally with good excursion.  CV: regular rate and rhythm. No murmurs.   Abdomen: soft, non distended, nontender, Bowel sound normal.  Ext: no edema, color normal, vascularity normal, temperature normal    Assessment and Plan:   The following treatment plan was discussed; Return in 2 months for follow-up.     1. Slow transit constipation  - Patient is having constipation and rectal pain. Counseled to wash with soap and water after bowel movements followed by a warm sitz bath   - She can continue taking Metamucil to prevent constipation.   - Advised to decrease amount of spicy food and eat more fruits, vegetables and fiber.   - Increase water intake to remain well hydrated.   - Comp Metabolic Panel; Future    2. Hypothyroidism (acquired)  - Stable. Patient would like to increase thyroid medication. Discussed the risks and benefits of this.  Patient concerned of thyroid hormone is not adequate.  I advised that she continue current dose of Levothyroxine 25 mcg throughout the week and increase dose from 25 to 50 mcg on Saturday and Sunday.  Otherwise, she is okay to continue levothyroxine 25 mcg daily without increasing the dose.  I will recheck her blood test in 8 weeks and adjust the dose based on the thyroid function level.  She is agreeable with this plan but will follow-up with her gynecologist tomorrow. Follow-up in 2 months.   - Comp Metabolic Panel; Future    3. Normocytic anemia  - Patient is anemic per recent labs. She is advised to eat an iron rich diet or take iron supplements.   - Recommended that she taking Vitamin D supplements.   - Advised to continue prenatal vitamins and increase water intake.  - Comp Metabolic Panel; Future    4. Vitamin D insufficiency  - Vitamin D is low.  Advised Vitamin D supplements, 2000 units daily, but she will confirm with her gynecologist tomorrow.  - Comp Metabolic Panel; Future    5. Health Maintenance   - Patient is due for Tdap vaccine.  She will get it in her OB/GYN clinic.      Followup: Return in about 8 weeks (around 10/3/2019), or if symptoms worsen or fail to improve, for Hypothyroid, constipation, anemia, vitamin D insufficiency, lab review.      Please note that this dictation was created using voice recognition software. I have made every reasonable attempt to correct obvious errors, but I expect that there may have unintended errors in text, spelling, punctuation, or grammar that I did not discover.    I, Amber Houston (Scribe), am scribing for, and in the presence of, Sandrine Pan M.D..    Electronically signed by: Amber Houston (Jorgitoibsuman), 8/8/2019    I, Sandrine Pan M.D., personally performed the services described in this documentation, as scribed by Amber Houston in my presence, and it is both accurate and complete.

## 2019-08-19 ENCOUNTER — TELEPHONE (OUTPATIENT)
Dept: MEDICAL GROUP | Age: 34
End: 2019-08-19

## 2019-08-19 NOTE — TELEPHONE ENCOUNTER
VOICEMAIL  1. Caller Name: Milli Santizo      Call Back Number: [phone] (home)     2. Message: Pt left vm inquiring about why & where a referral was given to. Return call. No answer. Received voice message stating caller not accepting calls at this time. Unable to lvm.    3. Patient approves office to leave a detailed voicemail/MyChart message: N\A

## 2019-10-19 ENCOUNTER — HOSPITAL ENCOUNTER (OUTPATIENT)
Dept: LAB | Facility: MEDICAL CENTER | Age: 34
End: 2019-10-19
Attending: INTERNAL MEDICINE
Payer: COMMERCIAL

## 2019-10-19 DIAGNOSIS — D64.9 NORMOCYTIC ANEMIA: ICD-10-CM

## 2019-10-19 DIAGNOSIS — K59.01 SLOW TRANSIT CONSTIPATION: ICD-10-CM

## 2019-10-19 DIAGNOSIS — E03.9 HYPOTHYROIDISM (ACQUIRED): ICD-10-CM

## 2019-10-19 DIAGNOSIS — R53.82 CHRONIC FATIGUE: ICD-10-CM

## 2019-10-19 DIAGNOSIS — E55.9 VITAMIN D INSUFFICIENCY: ICD-10-CM

## 2019-10-19 LAB
25(OH)D3 SERPL-MCNC: 24 NG/ML (ref 30–100)
ALBUMIN SERPL BCP-MCNC: 3.3 G/DL (ref 3.2–4.9)
ALBUMIN/GLOB SERPL: 1.2 G/DL
ALP SERPL-CCNC: 99 U/L (ref 30–99)
ALT SERPL-CCNC: 20 U/L (ref 2–50)
ANION GAP SERPL CALC-SCNC: 9 MMOL/L (ref 0–11.9)
AST SERPL-CCNC: 18 U/L (ref 12–45)
BILIRUB SERPL-MCNC: 0.3 MG/DL (ref 0.1–1.5)
BUN SERPL-MCNC: 7 MG/DL (ref 8–22)
CALCIUM SERPL-MCNC: 8.4 MG/DL (ref 8.5–10.5)
CHLORIDE SERPL-SCNC: 107 MMOL/L (ref 96–112)
CO2 SERPL-SCNC: 22 MMOL/L (ref 20–33)
CREAT SERPL-MCNC: 0.45 MG/DL (ref 0.5–1.4)
FASTING STATUS PATIENT QL REPORTED: NORMAL
FERRITIN SERPL-MCNC: 20.6 NG/ML (ref 10–291)
FOLATE SERPL-MCNC: >22.4 NG/ML
GLOBULIN SER CALC-MCNC: 2.8 G/DL (ref 1.9–3.5)
GLUCOSE SERPL-MCNC: 80 MG/DL (ref 65–99)
IRON SATN MFR SERPL: 25 % (ref 15–55)
IRON SERPL-MCNC: 118 UG/DL (ref 40–170)
POTASSIUM SERPL-SCNC: 3.9 MMOL/L (ref 3.6–5.5)
PROT SERPL-MCNC: 6.1 G/DL (ref 6–8.2)
SODIUM SERPL-SCNC: 138 MMOL/L (ref 135–145)
TIBC SERPL-MCNC: 468 UG/DL (ref 250–450)
VIT B12 SERPL-MCNC: 252 PG/ML (ref 211–911)

## 2019-10-19 PROCEDURE — 82607 VITAMIN B-12: CPT

## 2019-10-19 PROCEDURE — 82306 VITAMIN D 25 HYDROXY: CPT

## 2019-10-19 PROCEDURE — 83540 ASSAY OF IRON: CPT

## 2019-10-19 PROCEDURE — 82728 ASSAY OF FERRITIN: CPT

## 2019-10-19 PROCEDURE — 36415 COLL VENOUS BLD VENIPUNCTURE: CPT

## 2019-10-19 PROCEDURE — 83550 IRON BINDING TEST: CPT

## 2019-10-19 PROCEDURE — 82746 ASSAY OF FOLIC ACID SERUM: CPT

## 2019-10-19 PROCEDURE — 80053 COMPREHEN METABOLIC PANEL: CPT

## 2019-10-24 ENCOUNTER — OFFICE VISIT (OUTPATIENT)
Dept: MEDICAL GROUP | Age: 34
End: 2019-10-24
Payer: COMMERCIAL

## 2019-10-24 VITALS
HEIGHT: 60 IN | BODY MASS INDEX: 31.53 KG/M2 | DIASTOLIC BLOOD PRESSURE: 64 MMHG | OXYGEN SATURATION: 94 % | TEMPERATURE: 98.8 F | WEIGHT: 160.6 LBS | HEART RATE: 97 BPM | SYSTOLIC BLOOD PRESSURE: 100 MMHG

## 2019-10-24 DIAGNOSIS — E55.9 VITAMIN D INSUFFICIENCY: ICD-10-CM

## 2019-10-24 DIAGNOSIS — E03.9 HYPOTHYROIDISM (ACQUIRED): ICD-10-CM

## 2019-10-24 DIAGNOSIS — Z3A.29 29 WEEKS GESTATION OF PREGNANCY: ICD-10-CM

## 2019-10-24 DIAGNOSIS — E83.51 HYPOCALCEMIA: ICD-10-CM

## 2019-10-24 PROBLEM — Z34.90 PREGNANCY: Status: ACTIVE | Noted: 2019-10-24

## 2019-10-24 PROCEDURE — 99214 OFFICE O/P EST MOD 30 MIN: CPT | Performed by: INTERNAL MEDICINE

## 2019-10-24 ASSESSMENT — PAIN SCALES - GENERAL: PAINLEVEL: NO PAIN

## 2019-10-24 NOTE — PROGRESS NOTES
Subjective:   Aung Blanchard is a 34 y.o. female here today for evaluation and management of:    Hypothyroidism (acquired)  Chronic in nature and stable. Patient continues to take levothyroxine 25 mcg every morning on an empty stomach. No reports of medication side effects at this time including new fatigue, cold/heat intolerance, weight gain, weight loss, diarrhea, constipation, dry skin, myalgia, depressed mood, palpitations, tremor, hair loss, or goiter. Prior labs on 10/7/19 indicate she thyroid levels are well replaced with current medication dose.    Results for AUNG BLANCHARD (MRN [id number]) as of 10/24/2019 15:20   Ref. Range 7/24/2019 10/7/2019    TSH Latest Ref Range: 0.380 - 5.330 uIU/mL 3.150 2.320   Free T-4 Latest Ref Range: 0.53 - 1.43 ng/dL 0.74 0.96     Vitamin D insufficiency  Hypocalcemia  Patient takes 1,000 IU's of OTC Vitamin D supplementation daily and prenatal vitamins. Blood work was completed prior to this visit indicates patient's vitamin D levels are still low at 24.  She has slightly low calcium at 8.4.  Patient reported that she sometimes take Tums for acid reflux.    Results for AUNG BLANCHARD (MRN [id number]) as of 10/24/2019 15:20   Ref. Range 10/19/2019 07:52   25-Hydroxy   Vitamin D 25 Latest Ref Range: 30 - 100 ng/mL 24 (L)   Ferritin Latest Ref Range: 10.0 - 291.0 ng/mL 20.6   Folate -Folic Acid Latest Ref Range: >4.0 ng/mL >22.4   Vitamin B12 -True Cobalamin Latest Ref Range: 211 - 911 pg/mL 252     29 weeks gestation of pregnancy  Patient is being followed by Dr. Montgomery (OBN). She reports no complications at this time.  She has follow-up appointment with Dr. Montgomery on 10/29/19.    I discussed the blood test with the patient in clinic today.    Current medicines (including changes today)  Current Outpatient Medications   Medication Sig Dispense Refill   • Cholecalciferol (VITAMIN D3) 1000 units Cap Take  by mouth 2 Times a Day.     • IRON PO Take  by mouth every 48 hours.      • levothyroxine (SYNTHROID) 25 MCG Tab TAKE 1 TAB BY MOUTH EVERY MORNING ON AN EMPTY STOMACH. 90 Tab 3   • Prenatal Multivit-Min-Fe-FA (PRENATAL VITAMINS PO) Take  by mouth.       No current facility-administered medications for this visit.      She  has a past medical history of Anxiety and Positive PPD. She also has no past medical history of Alcohol abuse, Alcoholism (HCC), Depression, Psychosis (HCC), or Substance abuse (HCC).    ROS   No chest pain, no shortness of breath, no abdominal pain       Objective:     /64 (BP Location: Right arm, Patient Position: Sitting, BP Cuff Size: Adult)   Pulse 97   Temp 37.1 °C (98.8 °F) (Temporal)   Ht 1.524 m (5')   Wt 72.8 kg (160 lb 9.6 oz)   SpO2 94%  Body mass index is 31.37 kg/m².   Physical Exam:  General: Alert, oriented and no acute distress.  Eye contact is good, speech goal directed, affect calm  HEENT: conjunctiva non-injected, sclera non-icteric.  Oral mucous membranes pink and moist with no lesions.  Pinna normal.   Lungs: Normal respiratory effort, clear to auscultation bilaterally with good excursion.  CV: regular rate and rhythm. No murmurs.   Abdomen: soft, nontender, Bowel sound normal.  Ext: no edema, color normal, vascularity normal, temperature normal        Assessment and Plan:   The following treatment plan was discussed     1. Hypothyroidism (acquired)  - Patient has been stable with current management.  Continue levothyroxine 25 mcg every morning on empty stomach.  We will make no changes for now.  Patient is advised to recheck thyroid function test in 6 to 8 weeks in her third trimester.  She will repeat thyroid function test with her OB/GYN.    2. Vitamin D insufficiency  3. Hypocalcemia  - Discussed vitamin D levels with patient and informed her that her levels are still low. Discussed increasing vitamin D supplementation dosage from 1,000 IU once a day to 1,000 IU twice a day. Informed patient that increased vitamin D levels help  absorb calcium levels.   - Additionally advised patient to start taking calcium supplementation every day to bring up levels.   - Patient will recheck with gynecologist.     4. 29 weeks gestation of pregnancy  - Patient will continue to follow-up with Dr. Montgomery (OBGYN).  Continue to follow-up with gynecologist as scheduled.  - Counseled for balanced nutrition, to keep well hydration, regular walking exercise as tolerated.    5. Health Maintenance   Patient is up-to-date with all HM.     Followup: Return in about 8 weeks (around 12/19/2019), or if symptoms worsen or fail to improve, for Hypothyroid, Vitamin D insufficiency.      Please note that this dictation was created using voice recognition software. I have made every reasonable attempt to correct obvious errors, but I expect that there may have unintended errors in text, spelling, punctuation, or grammar that I did not discover.           I, Amber Gutiérrez (Mj), am scribing for, and in the presence of, Sandrine Pan M.D.. Electronically signed by: Amber Gutiérrez (Mj), (10/24/2019).  I, Sandrine Pan M.D., personally performed the services described in this documentation, as scribed by Amber Gutiérrez in my presence, and it is both accurate and complete.

## 2019-10-29 DIAGNOSIS — E03.9 HYPOTHYROIDISM (ACQUIRED): ICD-10-CM

## 2019-10-29 RX ORDER — LEVOTHYROXINE SODIUM 0.03 MG/1
25 TABLET ORAL
Qty: 90 TAB | Refills: 3 | Status: SHIPPED | OUTPATIENT
Start: 2019-10-29 | End: 2020-09-02

## 2019-10-29 NOTE — PROGRESS NOTES
Patient requested to refill levothyroxine through my chart.    Refill done.    Sandrine Pan M.D.

## 2019-12-28 ENCOUNTER — HOSPITAL ENCOUNTER (INPATIENT)
Facility: MEDICAL CENTER | Age: 34
LOS: 5 days | End: 2020-01-02
Attending: OBSTETRICS & GYNECOLOGY | Admitting: OBSTETRICS & GYNECOLOGY
Payer: COMMERCIAL

## 2019-12-28 ENCOUNTER — ANESTHESIA (OUTPATIENT)
Dept: OBGYN | Facility: MEDICAL CENTER | Age: 34
End: 2019-12-28
Payer: COMMERCIAL

## 2019-12-28 ENCOUNTER — ANESTHESIA EVENT (OUTPATIENT)
Dept: OBGYN | Facility: MEDICAL CENTER | Age: 34
End: 2019-12-28
Payer: COMMERCIAL

## 2019-12-28 DIAGNOSIS — G89.18 POST-OP PAIN: ICD-10-CM

## 2019-12-28 LAB
ALBUMIN SERPL BCP-MCNC: 3.6 G/DL (ref 3.2–4.9)
ALBUMIN/GLOB SERPL: 1.2 G/DL
ALP SERPL-CCNC: 147 U/L (ref 30–99)
ALT SERPL-CCNC: 19 U/L (ref 2–50)
ANION GAP SERPL CALC-SCNC: 12 MMOL/L (ref 0–11.9)
AST SERPL-CCNC: 25 U/L (ref 12–45)
BASOPHILS # BLD AUTO: 0.2 % (ref 0–1.8)
BASOPHILS # BLD: 0.02 K/UL (ref 0–0.12)
BILIRUB SERPL-MCNC: 0.3 MG/DL (ref 0.1–1.5)
BUN SERPL-MCNC: 8 MG/DL (ref 8–22)
CALCIUM SERPL-MCNC: 9 MG/DL (ref 8.5–10.5)
CHLORIDE SERPL-SCNC: 107 MMOL/L (ref 96–112)
CO2 SERPL-SCNC: 18 MMOL/L (ref 20–33)
CREAT SERPL-MCNC: 0.53 MG/DL (ref 0.5–1.4)
EOSINOPHIL # BLD AUTO: 0.06 K/UL (ref 0–0.51)
EOSINOPHIL NFR BLD: 0.6 % (ref 0–6.9)
ERYTHROCYTE [DISTWIDTH] IN BLOOD BY AUTOMATED COUNT: 44.8 FL (ref 35.9–50)
GLOBULIN SER CALC-MCNC: 3.1 G/DL (ref 1.9–3.5)
GLUCOSE SERPL-MCNC: 113 MG/DL (ref 65–99)
HCT VFR BLD AUTO: 46.4 % (ref 37–47)
HGB BLD-MCNC: 15.1 G/DL (ref 12–16)
HOLDING TUBE BB 8507: NORMAL
IMM GRANULOCYTES # BLD AUTO: 0.04 K/UL (ref 0–0.11)
IMM GRANULOCYTES NFR BLD AUTO: 0.4 % (ref 0–0.9)
LYMPHOCYTES # BLD AUTO: 1.52 K/UL (ref 1–4.8)
LYMPHOCYTES NFR BLD: 16.3 % (ref 22–41)
MCH RBC QN AUTO: 29 PG (ref 27–33)
MCHC RBC AUTO-ENTMCNC: 32.5 G/DL (ref 33.6–35)
MCV RBC AUTO: 89.2 FL (ref 81.4–97.8)
MONOCYTES # BLD AUTO: 0.5 K/UL (ref 0–0.85)
MONOCYTES NFR BLD AUTO: 5.4 % (ref 0–13.4)
NEUTROPHILS # BLD AUTO: 7.17 K/UL (ref 2–7.15)
NEUTROPHILS NFR BLD: 77.1 % (ref 44–72)
NRBC # BLD AUTO: 0 K/UL
NRBC BLD-RTO: 0 /100 WBC
PLATELET # BLD AUTO: 156 K/UL (ref 164–446)
PMV BLD AUTO: 12.2 FL (ref 9–12.9)
POTASSIUM SERPL-SCNC: 3.7 MMOL/L (ref 3.6–5.5)
PROT SERPL-MCNC: 6.7 G/DL (ref 6–8.2)
RBC # BLD AUTO: 5.2 M/UL (ref 4.2–5.4)
SODIUM SERPL-SCNC: 137 MMOL/L (ref 135–145)
URATE SERPL-MCNC: 3.6 MG/DL (ref 1.9–8.2)
WBC # BLD AUTO: 9.3 K/UL (ref 4.8–10.8)

## 2019-12-28 PROCEDURE — 80053 COMPREHEN METABOLIC PANEL: CPT

## 2019-12-28 PROCEDURE — 85025 COMPLETE CBC W/AUTO DIFF WBC: CPT

## 2019-12-28 PROCEDURE — 700105 HCHG RX REV CODE 258: Performed by: OBSTETRICS & GYNECOLOGY

## 2019-12-28 PROCEDURE — 700111 HCHG RX REV CODE 636 W/ 250 OVERRIDE (IP)

## 2019-12-28 PROCEDURE — 84550 ASSAY OF BLOOD/URIC ACID: CPT

## 2019-12-28 PROCEDURE — 700111 HCHG RX REV CODE 636 W/ 250 OVERRIDE (IP): Performed by: ANESTHESIOLOGY

## 2019-12-28 PROCEDURE — 770002 HCHG ROOM/CARE - OB PRIVATE (112)

## 2019-12-28 PROCEDURE — 3E033VJ INTRODUCTION OF OTHER HORMONE INTO PERIPHERAL VEIN, PERCUTANEOUS APPROACH: ICD-10-PCS | Performed by: OBSTETRICS & GYNECOLOGY

## 2019-12-28 RX ORDER — OXYCODONE AND ACETAMINOPHEN 10; 325 MG/1; MG/1
1 TABLET ORAL EVERY 4 HOURS PRN
Status: CANCELLED | OUTPATIENT
Start: 2019-12-28

## 2019-12-28 RX ORDER — IBUPROFEN 600 MG/1
600 TABLET ORAL EVERY 6 HOURS PRN
Status: CANCELLED | OUTPATIENT
Start: 2019-12-28

## 2019-12-28 RX ORDER — MISOPROSTOL 200 UG/1
800 TABLET ORAL
Status: DISCONTINUED | OUTPATIENT
Start: 2019-12-28 | End: 2019-12-29 | Stop reason: HOSPADM

## 2019-12-28 RX ORDER — ACETAMINOPHEN 325 MG/1
325 TABLET ORAL EVERY 4 HOURS PRN
Status: CANCELLED | OUTPATIENT
Start: 2019-12-28

## 2019-12-28 RX ORDER — ONDANSETRON 4 MG/1
4 TABLET, ORALLY DISINTEGRATING ORAL EVERY 6 HOURS PRN
Status: CANCELLED | OUTPATIENT
Start: 2019-12-28

## 2019-12-28 RX ORDER — SODIUM CHLORIDE, SODIUM LACTATE, POTASSIUM CHLORIDE, AND CALCIUM CHLORIDE .6; .31; .03; .02 G/100ML; G/100ML; G/100ML; G/100ML
1000 INJECTION, SOLUTION INTRAVENOUS
Status: DISCONTINUED | OUTPATIENT
Start: 2019-12-28 | End: 2019-12-29 | Stop reason: HOSPADM

## 2019-12-28 RX ORDER — OXYCODONE HYDROCHLORIDE AND ACETAMINOPHEN 5; 325 MG/1; MG/1
1 TABLET ORAL EVERY 4 HOURS PRN
Status: CANCELLED | OUTPATIENT
Start: 2019-12-28

## 2019-12-28 RX ORDER — SODIUM CHLORIDE, SODIUM LACTATE, POTASSIUM CHLORIDE, AND CALCIUM CHLORIDE .6; .31; .03; .02 G/100ML; G/100ML; G/100ML; G/100ML
250 INJECTION, SOLUTION INTRAVENOUS PRN
Status: DISCONTINUED | OUTPATIENT
Start: 2019-12-28 | End: 2019-12-29 | Stop reason: HOSPADM

## 2019-12-28 RX ORDER — ONDANSETRON 2 MG/ML
4 INJECTION INTRAMUSCULAR; INTRAVENOUS EVERY 6 HOURS PRN
Status: CANCELLED | OUTPATIENT
Start: 2019-12-28

## 2019-12-28 RX ORDER — ROPIVACAINE HYDROCHLORIDE 2 MG/ML
INJECTION, SOLUTION EPIDURAL; INFILTRATION; PERINEURAL CONTINUOUS
Status: DISCONTINUED | OUTPATIENT
Start: 2019-12-28 | End: 2019-12-29

## 2019-12-28 RX ORDER — ROPIVACAINE HYDROCHLORIDE 2 MG/ML
INJECTION, SOLUTION EPIDURAL; INFILTRATION; PERINEURAL
Status: COMPLETED
Start: 2019-12-28 | End: 2019-12-28

## 2019-12-28 RX ORDER — SODIUM CHLORIDE, SODIUM LACTATE, POTASSIUM CHLORIDE, CALCIUM CHLORIDE 600; 310; 30; 20 MG/100ML; MG/100ML; MG/100ML; MG/100ML
INJECTION, SOLUTION INTRAVENOUS CONTINUOUS
Status: DISPENSED | OUTPATIENT
Start: 2019-12-28 | End: 2019-12-29

## 2019-12-28 RX ADMIN — Medication 2 MILLI-UNITS/MIN: at 19:30

## 2019-12-28 RX ADMIN — SODIUM CHLORIDE, POTASSIUM CHLORIDE, SODIUM LACTATE AND CALCIUM CHLORIDE: 600; 310; 30; 20 INJECTION, SOLUTION INTRAVENOUS at 22:21

## 2019-12-28 RX ADMIN — SODIUM CHLORIDE, POTASSIUM CHLORIDE, SODIUM LACTATE AND CALCIUM CHLORIDE: 600; 310; 30; 20 INJECTION, SOLUTION INTRAVENOUS at 21:40

## 2019-12-28 RX ADMIN — SODIUM CHLORIDE, SODIUM GLUCONATE, SODIUM ACETATE, POTASSIUM CHLORIDE AND MAGNESIUM CHLORIDE: 526; 502; 368; 37; 30 INJECTION, SOLUTION INTRAVENOUS at 22:30

## 2019-12-28 RX ADMIN — ROPIVACAINE HYDROCHLORIDE 200 MG: 2 INJECTION, SOLUTION EPIDURAL; INFILTRATION at 22:36

## 2019-12-28 RX ADMIN — ROPIVACAINE HYDROCHLORIDE 200 MG: 2 INJECTION, SOLUTION EPIDURAL; INFILTRATION; PERINEURAL at 22:36

## 2019-12-28 RX ADMIN — FENTANYL CITRATE 20 MCG: 50 INJECTION, SOLUTION INTRAMUSCULAR; INTRAVENOUS at 22:40

## 2019-12-28 ASSESSMENT — LIFESTYLE VARIABLES
TOTAL SCORE: 0
ALCOHOL_USE: NO
EVER FELT BAD OR GUILTY ABOUT YOUR DRINKING: NO
HOW MANY TIMES IN THE PAST YEAR HAVE YOU HAD 5 OR MORE DRINKS IN A DAY: 0
HAVE PEOPLE ANNOYED YOU BY CRITICIZING YOUR DRINKING: NO
TOTAL SCORE: 0
AVERAGE NUMBER OF DAYS PER WEEK YOU HAVE A DRINK CONTAINING ALCOHOL: 0
EVER HAD A DRINK FIRST THING IN THE MORNING TO STEADY YOUR NERVES TO GET RID OF A HANGOVER: NO
HAVE YOU EVER FELT YOU SHOULD CUT DOWN ON YOUR DRINKING: NO
ON A TYPICAL DAY WHEN YOU DRINK ALCOHOL HOW MANY DRINKS DO YOU HAVE: 0
DOES PATIENT WANT TO STOP DRINKING: NO
CONSUMPTION TOTAL: NEGATIVE
TOTAL SCORE: 0

## 2019-12-28 ASSESSMENT — PATIENT HEALTH QUESTIONNAIRE - PHQ9
2. FEELING DOWN, DEPRESSED, IRRITABLE, OR HOPELESS: NOT AT ALL
SUM OF ALL RESPONSES TO PHQ9 QUESTIONS 1 AND 2: 0
1. LITTLE INTEREST OR PLEASURE IN DOING THINGS: NOT AT ALL
1. LITTLE INTEREST OR PLEASURE IN DOING THINGS: NOT AT ALL
SUM OF ALL RESPONSES TO PHQ9 QUESTIONS 1 AND 2: 0
2. FEELING DOWN, DEPRESSED, IRRITABLE, OR HOPELESS: NOT AT ALL

## 2019-12-29 LAB
ERYTHROCYTE [DISTWIDTH] IN BLOOD BY AUTOMATED COUNT: 43.6 FL (ref 35.9–50)
GLUCOSE BLD-MCNC: 12 MG/DL (ref 65–99)
HCT VFR BLD AUTO: 40.1 % (ref 37–47)
HGB BLD-MCNC: 13.4 G/DL (ref 12–16)
MCH RBC QN AUTO: 29.6 PG (ref 27–33)
MCHC RBC AUTO-ENTMCNC: 33.4 G/DL (ref 33.6–35)
MCV RBC AUTO: 88.5 FL (ref 81.4–97.8)
NUMBER OF RH DOSES IND 8505RD: NORMAL
PLATELET # BLD AUTO: 137 K/UL (ref 164–446)
PMV BLD AUTO: 11.9 FL (ref 9–12.9)
RBC # BLD AUTO: 4.53 M/UL (ref 4.2–5.4)
WBC # BLD AUTO: 20.2 K/UL (ref 4.8–10.8)

## 2019-12-29 PROCEDURE — 306288 HCHG RETRACTOR C SECTION LG

## 2019-12-29 PROCEDURE — 305385 HCHG SURGICAL SERVICES 1/4 HOUR: Performed by: OBSTETRICS & GYNECOLOGY

## 2019-12-29 PROCEDURE — 82962 GLUCOSE BLOOD TEST: CPT

## 2019-12-29 PROCEDURE — 700111 HCHG RX REV CODE 636 W/ 250 OVERRIDE (IP): Performed by: ANESTHESIOLOGY

## 2019-12-29 PROCEDURE — 10H07YZ INSERTION OF OTHER DEVICE INTO PRODUCTS OF CONCEPTION, VIA NATURAL OR ARTIFICIAL OPENING: ICD-10-PCS | Performed by: OBSTETRICS & GYNECOLOGY

## 2019-12-29 PROCEDURE — A9270 NON-COVERED ITEM OR SERVICE: HCPCS | Performed by: ANESTHESIOLOGY

## 2019-12-29 PROCEDURE — 304966 HCHG RECOVERY SVSC TIME ADDL 1/2 HR: Performed by: OBSTETRICS & GYNECOLOGY

## 2019-12-29 PROCEDURE — 700101 HCHG RX REV CODE 250: Performed by: ANESTHESIOLOGY

## 2019-12-29 PROCEDURE — 700111 HCHG RX REV CODE 636 W/ 250 OVERRIDE (IP): Performed by: OBSTETRICS & GYNECOLOGY

## 2019-12-29 PROCEDURE — 59514 CESAREAN DELIVERY ONLY: CPT

## 2019-12-29 PROCEDURE — 59514 CESAREAN DELIVERY ONLY: CPT | Mod: 80 | Performed by: OBSTETRICS & GYNECOLOGY

## 2019-12-29 PROCEDURE — 700105 HCHG RX REV CODE 258: Performed by: OBSTETRICS & GYNECOLOGY

## 2019-12-29 PROCEDURE — 85027 COMPLETE CBC AUTOMATED: CPT

## 2019-12-29 PROCEDURE — 700105 HCHG RX REV CODE 258: Performed by: ANESTHESIOLOGY

## 2019-12-29 PROCEDURE — 700111 HCHG RX REV CODE 636 W/ 250 OVERRIDE (IP)

## 2019-12-29 PROCEDURE — 304964 HCHG RECOVERY ROOM TIME 1HR: Performed by: OBSTETRICS & GYNECOLOGY

## 2019-12-29 PROCEDURE — 306828 HCHG ANES-TIME GENERAL: Performed by: OBSTETRICS & GYNECOLOGY

## 2019-12-29 PROCEDURE — 770002 HCHG ROOM/CARE - OB PRIVATE (112)

## 2019-12-29 PROCEDURE — 700102 HCHG RX REV CODE 250 W/ 637 OVERRIDE(OP)

## 2019-12-29 PROCEDURE — 700102 HCHG RX REV CODE 250 W/ 637 OVERRIDE(OP): Performed by: ANESTHESIOLOGY

## 2019-12-29 PROCEDURE — 36415 COLL VENOUS BLD VENIPUNCTURE: CPT

## 2019-12-29 PROCEDURE — A9270 NON-COVERED ITEM OR SERVICE: HCPCS

## 2019-12-29 RX ORDER — OXYCODONE HYDROCHLORIDE 10 MG/1
10 TABLET ORAL EVERY 4 HOURS PRN
Status: DISCONTINUED | OUTPATIENT
Start: 2019-12-29 | End: 2019-12-30

## 2019-12-29 RX ORDER — SODIUM CHLORIDE, SODIUM GLUCONATE, SODIUM ACETATE, POTASSIUM CHLORIDE AND MAGNESIUM CHLORIDE 526; 502; 368; 37; 30 MG/100ML; MG/100ML; MG/100ML; MG/100ML; MG/100ML
INJECTION, SOLUTION INTRAVENOUS
Status: COMPLETED
Start: 2019-12-29 | End: 2019-12-28

## 2019-12-29 RX ORDER — LORAZEPAM 2 MG/ML
0.5 INJECTION INTRAMUSCULAR
Status: DISCONTINUED | OUTPATIENT
Start: 2019-12-29 | End: 2019-12-29 | Stop reason: HOSPADM

## 2019-12-29 RX ORDER — OXYCODONE HCL 5 MG/5 ML
10 SOLUTION, ORAL ORAL
Status: DISCONTINUED | OUTPATIENT
Start: 2019-12-29 | End: 2019-12-29 | Stop reason: HOSPADM

## 2019-12-29 RX ORDER — SODIUM CHLORIDE, SODIUM GLUCONATE, SODIUM ACETATE, POTASSIUM CHLORIDE AND MAGNESIUM CHLORIDE 526; 502; 368; 37; 30 MG/100ML; MG/100ML; MG/100ML; MG/100ML; MG/100ML
INJECTION, SOLUTION INTRAVENOUS
Status: DISCONTINUED | OUTPATIENT
Start: 2019-12-28 | End: 2019-12-29 | Stop reason: SURG

## 2019-12-29 RX ORDER — HYDROMORPHONE HYDROCHLORIDE 1 MG/ML
0.4 INJECTION, SOLUTION INTRAMUSCULAR; INTRAVENOUS; SUBCUTANEOUS
Status: DISCONTINUED | OUTPATIENT
Start: 2019-12-29 | End: 2019-12-29 | Stop reason: HOSPADM

## 2019-12-29 RX ORDER — LIDOCAINE HCL/EPINEPHRINE/PF 2%-1:200K
VIAL (ML) INJECTION PRN
Status: DISCONTINUED | OUTPATIENT
Start: 2019-12-29 | End: 2019-12-29 | Stop reason: SURG

## 2019-12-29 RX ORDER — CEFAZOLIN SODIUM 1 G/3ML
INJECTION, POWDER, FOR SOLUTION INTRAMUSCULAR; INTRAVENOUS PRN
Status: DISCONTINUED | OUTPATIENT
Start: 2019-12-29 | End: 2019-12-29 | Stop reason: SURG

## 2019-12-29 RX ORDER — KETOROLAC TROMETHAMINE 30 MG/ML
INJECTION, SOLUTION INTRAMUSCULAR; INTRAVENOUS PRN
Status: DISCONTINUED | OUTPATIENT
Start: 2019-12-29 | End: 2019-12-29 | Stop reason: SURG

## 2019-12-29 RX ORDER — HYDROMORPHONE HYDROCHLORIDE 1 MG/ML
0.1 INJECTION, SOLUTION INTRAMUSCULAR; INTRAVENOUS; SUBCUTANEOUS
Status: DISCONTINUED | OUTPATIENT
Start: 2019-12-29 | End: 2019-12-29 | Stop reason: HOSPADM

## 2019-12-29 RX ORDER — MEPERIDINE HYDROCHLORIDE 25 MG/ML
6.25 INJECTION INTRAMUSCULAR; INTRAVENOUS; SUBCUTANEOUS
Status: DISCONTINUED | OUTPATIENT
Start: 2019-12-29 | End: 2019-12-29 | Stop reason: HOSPADM

## 2019-12-29 RX ORDER — OXYCODONE HYDROCHLORIDE 5 MG/1
5 TABLET ORAL EVERY 4 HOURS PRN
Status: DISCONTINUED | OUTPATIENT
Start: 2019-12-29 | End: 2019-12-30

## 2019-12-29 RX ORDER — BISACODYL 10 MG
10 SUPPOSITORY, RECTAL RECTAL PRN
Status: DISCONTINUED | OUTPATIENT
Start: 2019-12-29 | End: 2020-01-02 | Stop reason: HOSPADM

## 2019-12-29 RX ORDER — HYDROMORPHONE HYDROCHLORIDE 1 MG/ML
0.2 INJECTION, SOLUTION INTRAMUSCULAR; INTRAVENOUS; SUBCUTANEOUS
Status: DISCONTINUED | OUTPATIENT
Start: 2019-12-29 | End: 2019-12-29 | Stop reason: HOSPADM

## 2019-12-29 RX ORDER — DIPHENHYDRAMINE HYDROCHLORIDE 50 MG/ML
12.5 INJECTION INTRAMUSCULAR; INTRAVENOUS
Status: DISCONTINUED | OUTPATIENT
Start: 2019-12-29 | End: 2019-12-29 | Stop reason: HOSPADM

## 2019-12-29 RX ORDER — SODIUM CHLORIDE, SODIUM LACTATE, POTASSIUM CHLORIDE, CALCIUM CHLORIDE 600; 310; 30; 20 MG/100ML; MG/100ML; MG/100ML; MG/100ML
INJECTION, SOLUTION INTRAVENOUS CONTINUOUS
Status: DISCONTINUED | OUTPATIENT
Start: 2019-12-29 | End: 2019-12-29

## 2019-12-29 RX ORDER — OXYTOCIN 10 [USP'U]/ML
INJECTION, SOLUTION INTRAMUSCULAR; INTRAVENOUS PRN
Status: DISCONTINUED | OUTPATIENT
Start: 2019-12-29 | End: 2019-12-29 | Stop reason: SURG

## 2019-12-29 RX ORDER — KETOROLAC TROMETHAMINE 30 MG/ML
30 INJECTION, SOLUTION INTRAMUSCULAR; INTRAVENOUS EVERY 6 HOURS
Status: DISPENSED | OUTPATIENT
Start: 2019-12-29 | End: 2019-12-30

## 2019-12-29 RX ORDER — SIMETHICONE 80 MG
80 TABLET,CHEWABLE ORAL 4 TIMES DAILY PRN
Status: DISCONTINUED | OUTPATIENT
Start: 2019-12-29 | End: 2020-01-02 | Stop reason: HOSPADM

## 2019-12-29 RX ORDER — ONDANSETRON 2 MG/ML
4 INJECTION INTRAMUSCULAR; INTRAVENOUS
Status: DISCONTINUED | OUTPATIENT
Start: 2019-12-29 | End: 2019-12-29 | Stop reason: HOSPADM

## 2019-12-29 RX ORDER — MISOPROSTOL 200 UG/1
600 TABLET ORAL
Status: DISCONTINUED | OUTPATIENT
Start: 2019-12-29 | End: 2020-01-02 | Stop reason: HOSPADM

## 2019-12-29 RX ORDER — DIPHENHYDRAMINE HYDROCHLORIDE 50 MG/ML
12.5 INJECTION INTRAMUSCULAR; INTRAVENOUS EVERY 6 HOURS PRN
Status: DISCONTINUED | OUTPATIENT
Start: 2019-12-29 | End: 2019-12-30

## 2019-12-29 RX ORDER — HALOPERIDOL 5 MG/ML
1 INJECTION INTRAMUSCULAR
Status: DISCONTINUED | OUTPATIENT
Start: 2019-12-29 | End: 2019-12-29 | Stop reason: HOSPADM

## 2019-12-29 RX ORDER — CITRIC ACID/SODIUM CITRATE 334-500MG
SOLUTION, ORAL ORAL
Status: COMPLETED
Start: 2019-12-29 | End: 2019-12-29

## 2019-12-29 RX ORDER — VITAMIN A ACETATE, BETA CAROTENE, ASCORBIC ACID, CHOLECALCIFEROL, .ALPHA.-TOCOPHEROL ACETATE, DL-, THIAMINE MONONITRATE, RIBOFLAVIN, NIACINAMIDE, PYRIDOXINE HYDROCHLORIDE, FOLIC ACID, CYANOCOBALAMIN, CALCIUM CARBONATE, FERROUS FUMARATE, ZINC OXIDE, CUPRIC OXIDE 3080; 12; 120; 400; 1; 1.84; 3; 20; 22; 920; 25; 200; 27; 10; 2 [IU]/1; UG/1; MG/1; [IU]/1; MG/1; MG/1; MG/1; MG/1; MG/1; [IU]/1; MG/1; MG/1; MG/1; MG/1; MG/1
1 TABLET, FILM COATED ORAL EVERY MORNING
Status: DISCONTINUED | OUTPATIENT
Start: 2019-12-29 | End: 2020-01-02 | Stop reason: HOSPADM

## 2019-12-29 RX ORDER — PHENYLEPHRINE HYDROCHLORIDE 10 MG/ML
INJECTION, SOLUTION INTRAMUSCULAR; INTRAVENOUS; SUBCUTANEOUS PRN
Status: DISCONTINUED | OUTPATIENT
Start: 2019-12-29 | End: 2019-12-29 | Stop reason: SURG

## 2019-12-29 RX ORDER — ACETAMINOPHEN 500 MG
1000 TABLET ORAL EVERY 6 HOURS
Status: DISPENSED | OUTPATIENT
Start: 2019-12-29 | End: 2019-12-30

## 2019-12-29 RX ORDER — METOCLOPRAMIDE HYDROCHLORIDE 5 MG/ML
INJECTION INTRAMUSCULAR; INTRAVENOUS
Status: COMPLETED
Start: 2019-12-29 | End: 2019-12-29

## 2019-12-29 RX ORDER — KETOROLAC TROMETHAMINE 30 MG/ML
30 INJECTION, SOLUTION INTRAMUSCULAR; INTRAVENOUS EVERY 6 HOURS
Status: CANCELLED | OUTPATIENT
Start: 2019-12-29 | End: 2019-12-30

## 2019-12-29 RX ORDER — SODIUM CHLORIDE, SODIUM LACTATE, POTASSIUM CHLORIDE, CALCIUM CHLORIDE 600; 310; 30; 20 MG/100ML; MG/100ML; MG/100ML; MG/100ML
INJECTION, SOLUTION INTRAVENOUS PRN
Status: DISCONTINUED | OUTPATIENT
Start: 2019-12-29 | End: 2020-01-02 | Stop reason: HOSPADM

## 2019-12-29 RX ORDER — OXYCODONE HCL 5 MG/5 ML
5 SOLUTION, ORAL ORAL
Status: DISCONTINUED | OUTPATIENT
Start: 2019-12-29 | End: 2019-12-29 | Stop reason: HOSPADM

## 2019-12-29 RX ORDER — DEXAMETHASONE SODIUM PHOSPHATE 4 MG/ML
INJECTION, SOLUTION INTRA-ARTICULAR; INTRALESIONAL; INTRAMUSCULAR; INTRAVENOUS; SOFT TISSUE PRN
Status: DISCONTINUED | OUTPATIENT
Start: 2019-12-29 | End: 2019-12-29 | Stop reason: SURG

## 2019-12-29 RX ORDER — CARBOPROST TROMETHAMINE 250 UG/ML
250 INJECTION, SOLUTION INTRAMUSCULAR
Status: DISCONTINUED | OUTPATIENT
Start: 2019-12-29 | End: 2020-01-02 | Stop reason: HOSPADM

## 2019-12-29 RX ORDER — LEVOTHYROXINE SODIUM 0.03 MG/1
25 TABLET ORAL
Status: DISCONTINUED | OUTPATIENT
Start: 2019-12-29 | End: 2020-01-02 | Stop reason: HOSPADM

## 2019-12-29 RX ORDER — DOCUSATE SODIUM 100 MG/1
100 CAPSULE, LIQUID FILLED ORAL 2 TIMES DAILY PRN
Status: DISCONTINUED | OUTPATIENT
Start: 2019-12-29 | End: 2020-01-02 | Stop reason: HOSPADM

## 2019-12-29 RX ORDER — METHYLERGONOVINE MALEATE 0.2 MG/ML
0.2 INJECTION INTRAVENOUS
Status: DISCONTINUED | OUTPATIENT
Start: 2019-12-29 | End: 2020-01-02 | Stop reason: HOSPADM

## 2019-12-29 RX ORDER — DIPHENHYDRAMINE HYDROCHLORIDE 50 MG/ML
25 INJECTION INTRAMUSCULAR; INTRAVENOUS EVERY 6 HOURS PRN
Status: DISCONTINUED | OUTPATIENT
Start: 2019-12-29 | End: 2019-12-30

## 2019-12-29 RX ORDER — ONDANSETRON 2 MG/ML
4 INJECTION INTRAMUSCULAR; INTRAVENOUS EVERY 6 HOURS PRN
Status: DISCONTINUED | OUTPATIENT
Start: 2019-12-29 | End: 2019-12-30

## 2019-12-29 RX ORDER — ONDANSETRON 2 MG/ML
INJECTION INTRAMUSCULAR; INTRAVENOUS PRN
Status: DISCONTINUED | OUTPATIENT
Start: 2019-12-29 | End: 2019-12-29 | Stop reason: SURG

## 2019-12-29 RX ORDER — MORPHINE SULFATE 0.5 MG/ML
INJECTION, SOLUTION EPIDURAL; INTRATHECAL; INTRAVENOUS PRN
Status: DISCONTINUED | OUTPATIENT
Start: 2019-12-29 | End: 2019-12-29 | Stop reason: SURG

## 2019-12-29 RX ORDER — DEXTROSE, SODIUM CHLORIDE, SODIUM LACTATE, POTASSIUM CHLORIDE, AND CALCIUM CHLORIDE 5; .6; .31; .03; .02 G/100ML; G/100ML; G/100ML; G/100ML; G/100ML
INJECTION, SOLUTION INTRAVENOUS CONTINUOUS
Status: DISCONTINUED | OUTPATIENT
Start: 2019-12-29 | End: 2019-12-29

## 2019-12-29 RX ADMIN — ACETAMINOPHEN 1000 MG: 500 TABLET ORAL at 11:35

## 2019-12-29 RX ADMIN — SODIUM CHLORIDE, POTASSIUM CHLORIDE, SODIUM LACTATE AND CALCIUM CHLORIDE 300 ML: 600; 310; 30; 20 INJECTION, SOLUTION INTRAVENOUS at 04:08

## 2019-12-29 RX ADMIN — ACETAMINOPHEN 1000 MG: 500 TABLET ORAL at 18:21

## 2019-12-29 RX ADMIN — SODIUM CITRATE AND CITRIC ACID MONOHYDRATE 30 ML: 500; 334 SOLUTION ORAL at 06:32

## 2019-12-29 RX ADMIN — Medication 1 ML: at 06:50

## 2019-12-29 RX ADMIN — OXYTOCIN 20 UNITS: 10 INJECTION, SOLUTION INTRAMUSCULAR; INTRAVENOUS at 07:09

## 2019-12-29 RX ADMIN — AZITHROMYCIN MONOHYDRATE 500 MG: 500 INJECTION, POWDER, LYOPHILIZED, FOR SOLUTION INTRAVENOUS at 06:57

## 2019-12-29 RX ADMIN — FENTANYL CITRATE 100 MCG: 50 INJECTION, SOLUTION INTRAMUSCULAR; INTRAVENOUS at 07:00

## 2019-12-29 RX ADMIN — PHENYLEPHRINE HYDROCHLORIDE 100 MCG: 10 INJECTION INTRAVENOUS at 07:00

## 2019-12-29 RX ADMIN — LIDOCAINE HYDROCHLORIDE,EPINEPHRINE BITARTRATE 10 ML: 20; .005 INJECTION, SOLUTION EPIDURAL; INFILTRATION; INTRACAUDAL; PERINEURAL at 06:50

## 2019-12-29 RX ADMIN — METOCLOPRAMIDE 10 MG: 5 INJECTION, SOLUTION INTRAMUSCULAR; INTRAVENOUS at 06:32

## 2019-12-29 RX ADMIN — KETOROLAC TROMETHAMINE 30 MG: 30 INJECTION, SOLUTION INTRAMUSCULAR; INTRAVENOUS at 15:06

## 2019-12-29 RX ADMIN — SODIUM CHLORIDE, POTASSIUM CHLORIDE, SODIUM LACTATE AND CALCIUM CHLORIDE: 600; 310; 30; 20 INJECTION, SOLUTION INTRAVENOUS at 20:08

## 2019-12-29 RX ADMIN — SODIUM CHLORIDE, SODIUM GLUCONATE, SODIUM ACETATE, POTASSIUM CHLORIDE AND MAGNESIUM CHLORIDE: 526; 502; 368; 37; 30 INJECTION, SOLUTION INTRAVENOUS at 06:50

## 2019-12-29 RX ADMIN — ONDANSETRON 4 MG: 2 INJECTION INTRAMUSCULAR; INTRAVENOUS at 07:00

## 2019-12-29 RX ADMIN — PHENYLEPHRINE HYDROCHLORIDE 50 MCG/MIN: 10 INJECTION INTRAVENOUS at 06:50

## 2019-12-29 RX ADMIN — Medication 999 ML/HR: at 07:10

## 2019-12-29 RX ADMIN — FAMOTIDINE 20 MG: 10 INJECTION INTRAVENOUS at 06:32

## 2019-12-29 RX ADMIN — DEXAMETHASONE SODIUM PHOSPHATE 8 MG: 4 INJECTION, SOLUTION INTRA-ARTICULAR; INTRALESIONAL; INTRAMUSCULAR; INTRAVENOUS; SOFT TISSUE at 07:09

## 2019-12-29 RX ADMIN — MORPHINE SULFATE 3 MG: 0.5 INJECTION, SOLUTION EPIDURAL; INTRATHECAL; INTRAVENOUS at 07:09

## 2019-12-29 RX ADMIN — CEFAZOLIN 2 G: 330 INJECTION, POWDER, FOR SOLUTION INTRAMUSCULAR; INTRAVENOUS at 07:00

## 2019-12-29 RX ADMIN — KETOROLAC TROMETHAMINE 15 MG: 30 INJECTION, SOLUTION INTRAMUSCULAR at 07:25

## 2019-12-29 RX ADMIN — SODIUM CHLORIDE, POTASSIUM CHLORIDE, SODIUM LACTATE AND CALCIUM CHLORIDE: 600; 310; 30; 20 INJECTION, SOLUTION INTRAVENOUS at 01:53

## 2019-12-29 RX ADMIN — SODIUM CHLORIDE, SODIUM LACTATE, POTASSIUM CHLORIDE, CALCIUM CHLORIDE AND DEXTROSE MONOHYDRATE: 5; 600; 310; 30; 20 INJECTION, SOLUTION INTRAVENOUS at 01:55

## 2019-12-29 RX ADMIN — SODIUM CHLORIDE, POTASSIUM CHLORIDE, SODIUM LACTATE AND CALCIUM CHLORIDE: 600; 310; 30; 20 INJECTION, SOLUTION INTRAVENOUS at 03:37

## 2019-12-29 ASSESSMENT — PAIN SCALES - GENERAL: PAIN_LEVEL: 0

## 2019-12-29 ASSESSMENT — PATIENT HEALTH QUESTIONNAIRE - PHQ9
1. LITTLE INTEREST OR PLEASURE IN DOING THINGS: NOT AT ALL
SUM OF ALL RESPONSES TO PHQ9 QUESTIONS 1 AND 2: 0
2. FEELING DOWN, DEPRESSED, IRRITABLE, OR HOPELESS: NOT AT ALL

## 2019-12-29 NOTE — LACTATION NOTE
This note was copied from a baby's chart.  Assisted with breastfeeding attempt, baby was sleepy but after clothing and blankets were removed baby woke easily for feeding, latch achieved easily in football position, a deep latch with widely-flanged lips was achieved and a nutritive suck noted, breastfeeding education provided, encouraged to call for assistance as needed.

## 2019-12-29 NOTE — ANESTHESIA PREPROCEDURE EVALUATION
33 yo  @ 39w0d with h/o anxiety presenting for labor epidural    Relevant Problems   ENDO   (+) Hypothyroidism (acquired)       Physical Exam    Airway   Mallampati: II  TM distance: >3 FB  Neck ROM: full       Cardiovascular - normal exam  Rhythm: regular  Rate: normal  (-) murmur     Dental - normal exam         Pulmonary - normal exam  Breath sounds clear to auscultation     Abdominal    Neurological - normal exam                 Anesthesia Plan    ASA 2       Plan - CSE   Neuraxial block will be labor analgesia              Pertinent diagnostic labs and testing reviewed    Informed Consent:    Anesthetic plan and risks discussed with patient.    Use of blood products discussed with: patient whom consented to blood products.

## 2019-12-29 NOTE — H&P
DATE OF SURGERY:  2019    HISTORY OF PRESENT ILLNESS:  The patient is a 34-year-old  1, para 0   who presented yesterday at 39 weeks with rupture of membranes and clear fluid.    At the time of presentation, she was closed.  She was augmented with Pitocin   and has now progressed to 8 cm.  Unfortunately, she is now having recurrent   late and prolonged fetal decelerations, which recurred every time we try to   augment her labor.    PAST MEDICAL HISTORY:  The remainder of the patient's past medical history is   unchanged.    ALLERGIES:  She has no known drug allergies.    PAST SURGICAL HISTORY:  No prior surgical history.    Fetal tracing has currently returned to baseline 150s with moderate long-term   variability.    COUNSELING:  Patient has been counseled regarding options.  Given her   inability to continue augmentation, I recommend proceeding with primary    section.  The risks of this procedure include but are not limited to   bleeding; pain; infection; damage to other organs, nerves or blood vessels;   and in the event of visceral injury, more extensive or reoperation may be   warranted.  She understands that this has implications for future deliveries,   including need for future  section.  She understands that with each   subsequent , the risk of abnormal placentation such as increta,   percreta and accreta increase.  Finally, she understands that there are   general surgical risks such as DVT, pulmonary embolism, cardiovascular events,   reaction to medications or anesthesia, or in rare cases maternal or fetal   death.  All of her questions have been answered and consent has been signed.    Tracing has returned to category 2 tracing.    We will plan to proceed to the operating room shortly.             ____________________________________     MD IMAN CHAN / SITA    DD:  2019 06:41:56  DT:  2019 07:36:49    D#:  [id number]  Job#:  [id number]

## 2019-12-29 NOTE — ANESTHESIA PREPROCEDURE EVALUATION
35 yo  @ 39w0d with h/o anxiety presenting for labor epidural    Relevant Problems   ENDO   (+) Hypothyroidism (acquired)       Physical Exam    Airway   Mallampati: II  TM distance: >3 FB  Neck ROM: full       Cardiovascular - normal exam  Rhythm: regular  Rate: normal  (-) murmur     Dental - normal exam         Pulmonary - normal exam  Breath sounds clear to auscultation     Abdominal    Neurological - normal exam                 Anesthesia Plan    ASA 2       Plan - CSE   Neuraxial block will be labor analgesia              Pertinent diagnostic labs and testing reviewed    Informed Consent:    Anesthetic plan and risks discussed with patient.    Use of blood products discussed with: patient whom consented to blood products.

## 2019-12-29 NOTE — ANESTHESIA PREPROCEDURE EVALUATION
33 yo  @ 39w0d with h/o anxiety presenting for labor epidural    Relevant Problems   ENDO   (+) Hypothyroidism (acquired)       Physical Exam    Airway   Mallampati: II  TM distance: >3 FB  Neck ROM: full       Cardiovascular - normal exam  Rhythm: regular  Rate: normal  (-) murmur     Dental - normal exam         Pulmonary - normal exam  Breath sounds clear to auscultation     Abdominal   (+) obese     Neurological - normal exam                 Anesthesia Plan    ASA 2       Plan - CSE   Neuraxial block will be labor analgesia              Pertinent diagnostic labs and testing reviewed    Informed Consent:    Anesthetic plan and risks discussed with patient.    Use of blood products discussed with: patient whom consented to blood products.

## 2019-12-29 NOTE — OP REPORT
DATE OF SERVICE:  2019    PREOPERATIVE DIAGNOSES:  1.  Intrauterine pregnancy at 39 weeks and 1 day.  2.  Rupture of membranes.  3.  Fetal intolerance of labor.    POSTOPERATIVE DIAGNOSES:  1.  Intrauterine pregnancy at 39 weeks and 1 day.  2.  Rupture of membranes.  3.  Fetal intolerance of labor.    PROCEDURE PERFORMED:  Primary lower transverse  via Pfannenstiel skin   incision with 2-layer lower uterine segment closure.    SURGEON:  Nadira Abrams MD    ASSISTANT:  Geoff Lopez MD    ANESTHESIOLOGISTS:  1.  Viola Shelley MD  2.  Kaushal Pettit MD    ANESTHESIA TYPE:  Epidural.    DESCRIPTION OF PROCEDURE:  The patient was taken back to the operating room   where she had her epidural rebolused.  SCDs were already in place and a Mosley   catheter was already in place.  She was then sterilely prepped and draped in   the usual fashion.  After assuring adequate skin anesthesia, Pfannenstiel skin   incision was made sharply and carried down to the level of fascia using   electrocautery.  Fascial incision was extended bilaterally with Lopez scissors.    Kochers were placed on the inferior aspect of the fascia, which was   dissected off both sharply and bluntly.  The same procedure was then carried   out superiorly.    Midline of the rectus was identified and the peritoneum was entered bluntly.    Peritoneal incision was extended superiorly and inferiorly with Metzenbaum   scissors.  An Jose ring retractor was placed.  A bladder flap was created   using Metzenbaum scissors.  Hysterotomy site was made sharply and extended   bilaterally with the surgeon's hand.  Fetal head was elevated and delivered   through the hysterotomy site without difficulty.  Anterior and posterior   shoulders were delivered without difficulty.  Cord was allowed to pulsate for   30 seconds at which point, it was clamped and cut.  The baby was handed off to   nursing for routine evaluation and cord blood was collected  for gases.    Placenta delivered easily and intact under gentle manual traction.  The uterus   was cleaned internally with a dry lap to assure no remaining products of   conception.    Hysterotomy site was closed with a running locked suture of 0 Vicryl, second   imbricating layer was performed to provide adequate hemostasis.  Abdomen was   copiously irrigated and suctioned.  Tubes and ovaries were inspected and were   otherwise unremarkable.  Of note, the patient was reported to have a 4-cm   uterine fibroid, which could not be palpated on exam.  Jose ring retractor   was removed.  Peritoneal incision was closed with a running suture of 3-0   Vicryl.  The pyramidalis was reapproximated using 3-0 Vicryl.  Fascia was   closed with running suture of 0 Vicryl to midline bilaterally.  Subcutaneous   tissue was irrigated and reapproximated with 3-0 Vicryl.  Skin was closed with   4-0 Vicryl.    FINDINGS:  1.  Baby girl at 0708 hours, Apgars 8 and 8, weight 7 pounds 6 ounces, named   Amber Lozano.  2.  Normal uterus, ovaries and tubes.  3.  Clear amniotic fluid.    SPECIMENS:  Cord gases.    ESTIMATED BLOOD LOSS:  500 mL.    IV FLUIDS:  500 mL.    URINE OUTPUT:  300 mL with slight blood-tinged which was present prior to   proceeding to the OR.    MEDICATIONS:  Azithromycin 500 mg and Ancef 2 g.    COUNTS:  Correct.    COMPLICATIONS:  None apparent.    DISPOSITION:  Stable for routine postoperative care.       ____________________________________     MD IMAN CHAN / SITA    DD:  12/29/2019 07:59:24  DT:  12/29/2019 08:20:02    D#:  [id number]  Job#:  [id number]

## 2019-12-29 NOTE — LACTATION NOTE
This note was copied from a baby's chart.  Initial lactation visit:    Breastfeeding education completed including expected feeding frequency and duration, discussed expectations regarding maternal milk supply and infant intake at the breast, educated on the benefits of [id number], encouraged frequent [id number] and especially during breastfeeding attempts    Baby placed [id number] with mother and LC assisted with breastfeeding attempt, assisted with and educated on proper positioning for a deep latch, after just a few attempts a deep latch with widely-flanged lips was achieved, a nutritive suck was noted, mother denies pain during breastfeeding, encouraged frequent stimulation to maintain infant wakefulness for breastfeeding    Plan:  Q 3 hour (more often if feeding cues noted) breastfeeding attempts  Offer both breasts at each attempt  Burp after each breast  [id number]  Call for assistance as needed

## 2019-12-29 NOTE — PROGRESS NOTES
0700 - Received report from SALOME Terrazas RN. Pt in OR being prepped for c/s.   0708 - Delivery of viable baby girl, apgar 8/8.   0745 - Pt transferred to PACU, VSS.   0850 - Pt transferred to PP unit, report given to JESSIE Beth. All questions answered.

## 2019-12-29 NOTE — PROGRESS NOTES
1900  Received beside report from Anna ROMAN; assuming care of patient; all questions answered; patient resting in bed, denies needs at this time; vital signs in stable condition; patient educated regarding call light system; call light and patient belongings in reach; patient encouraged to call RN for any needs, wants, desires or concerns. Whiteboard updated.      1915 Dr. Abrams at bedside.     1918 Dr. Abrams stated to start pitocin.     1930 Pitocin started at 2mU.     2015 Patient turned, patient educated to lay on her side and not to lay on her back, verbalized understanding.     2135 Patient states she would like an epidural, RN started fluid bolus.     2210 Bedside report given to Anastacia, all questions answered.

## 2019-12-29 NOTE — OR SURGEON
"Immediate Post OP Note    PreOp Diagnosis: 1. IUP @ 39w1d  2. ROM  3. FIOL  PostOp Diagnosis: same     Procedure(s):   SECTION, PRIMARY    Surgeon(s):  UMER Moser M.D.     Anesthesiologist/Type of Anesthesia:  Shelley-->Wilver, Epidural       Specimens removed if any: cord gases     Estimated Blood Loss: 500cc  Fluids: 500cc  UOP: 300cc    Findings:   1. Baby Girl \"Amber Lozano\" @ 0708. Apgars 8/8, weight 7lb6oz  2. Normal uterus, ovaries, and tubes  3. Clear amniotic fluid    Meds: Azithromycin 500mg, Ancef 2g    Complications: none apparent  [id number]        2019 6:37 AM Nadira Abrams M.D.      "

## 2019-12-29 NOTE — ANESTHESIA POSTPROCEDURE EVALUATION
Patient: Milli Santizo    Procedure Summary     Date:  19 Room / Location:  D OR  / LABOR AND DELIVERY    Anesthesia Start:   Anesthesia Stop:  1949    Procedure:   SECTION, PRIMARY (Abdomen) Diagnosis:        delivery delivered      (Fetal intolerance to labor, 39.1 intrauterine pregnancy)    Surgeon:  Nadira Abrams M.D. Responsible Provider:  Kaushal Pettit M.D.    Anesthesia Type:  CSE ASA Status:  2          Final Anesthesia Type: CSE  Last vitals  BP   Blood Pressure: 118/71    Temp   37.1 °C (98.8 °F)    Pulse   Pulse: (!) 115   Resp        SpO2   98 %      Anesthesia Post Evaluation    Patient location during evaluation: floor  Patient participation: complete - patient participated  Level of consciousness: awake and alert  Pain score: 0    Airway patency: patent  Anesthetic complications: no  Cardiovascular status: hemodynamically stable  Respiratory status: acceptable  Hydration status: euvolemic    PONV: none    patient able to participate, but full recovery from regional anesthesia has not occurred and is not expected within the stipulated timeframe for the completion of the evaluation

## 2019-12-29 NOTE — ANESTHESIA PROCEDURE NOTES
CSE Block  Date/Time: 12/28/2019 10:40 PM  Performed by: Viola Shelley M.D.  Authorized by: Viola Shelley M.D.     Patient Location:  OB  Start Time:  12/28/2019 10:40 PM  Reason for Block: primary anesthetic and labor analgesia    patient identified, IV checked, site marked, risks and benefits discussed, surgical consent, monitors and equipment checked, pre-op evaluation and timeout performed    Patient Position:  Sitting  Prep: ChloraPrep, patient draped and sterile technique    Monitoring:  Blood pressure, continuous pulse oximetry and heart rate  Approach:  Midline  Needle Type:  Pencan  Needle Gauge:  25 G  Injection Technique:  BRAULIO air  Needle Type:  Tuohy  Needle Gauge:  17 G  Needle Length:  3.5  Loss of resistance:  5.5  Location:  L3-L4  Catheter Size:  19 G  Catheter at Skin Depth:  11  Epidural test dose: no test dose.  Sensory Level:  T10

## 2019-12-29 NOTE — PROGRESS NOTES
1600-Pt here for SROM. Gross amount of fluid seen. SVE cl/50/-4. Admission orders received from Dr Abrams  1639-IV started labs drawn  1700-Per Dr Abrams okay for pt to eat then once done start pitocin.   1715-Pt updated and will notify RN when done eating   1809-Monitors adjusted. Pt still eating  1900-Report given to Jenni ROMAN

## 2019-12-29 NOTE — PROGRESS NOTES
S: comfy with epidural    O: /79   Pulse (!) 106   Temp 36.6 °C (97.9 °F) (Temporal)   Ht 1.524 m (5')   Wt 72.6 kg (160 lb)   SpO2 98%   Gen: NAD  SVE: 5-6/90/-1    FHT: 130 with moderate LTV. +accels. No variables. Occ lates  [place]: CTX q3    A/P 35yo  @ 39w1d, prelabor ROM  1. Labor: progressing well. Continue pitocin  2. Cat II  3. Pain controlled.

## 2019-12-29 NOTE — PROGRESS NOTES
S: comfy with epidural     O: /78   Pulse (!) 108   Temp 36.4 °C (97.5 °F) (Temporal)   Ht 1.524 m (5')   Wt 72.6 kg (160 lb)   SpO2 98%   Gen: NAD  SVE: 7/100/-1  IUPC placed     FHT: 140 with moderate LTV. +accels. No variables. Recurrent lates, now resolved   [place]: CTX q3     A/P 33yo  @ 39w1d, prelabor ROM  1. Labor: Labor progressing but discussed fetal tracing. This has resolved with repositioning and IV bolus. Pitocin is discontinued at this time. Amnioinfusion started. Cervical exam is progressing at this time.   2. Cat III--->II  3. Pain controlled

## 2019-12-29 NOTE — ANESTHESIA TIME REPORT
Anesthesia Start and Stop Event Times     Date Time Event    2019 223 Ready for Procedure      Anesthesia Start    2019 0749 Anesthesia Stop        Responsible Staff  19 to 19    Name Role Begin End    Viola Shelley M.D. Anesth 2230 0716    Kaushal Pettit M.D. Anesth 0716 0749        Preop Diagnosis (Free Text):  Pre-op Diagnosis     Fetal intolerance to labor, 39.1 intrauterine pregnancy        Preop Diagnosis (Codes):  Diagnosis Information     Diagnosis Code(s):  delivery delivered [O82]        Post op Diagnosis  Failure to progress in labor      Premium Reason  E. Weekend    Comments: Dr. Shelley overnight, Dr. Pettit during day

## 2019-12-29 NOTE — PROGRESS NOTES
2210_ Assumed pt care. Report from NELSON Mata RN. Pt ready for epidural.    2223_ Dr Hill @ bedside for epidural. Time out called.    2300_ Mosley placed. SVE as noted.    0120_  Working @ bedside. SVE 5-6/90/-1. FHT reviewed by MD.    0330_ Dr Abrams called for pt update. Repetitive LD. Pitocin turned off, O2 by mask and IV fluid bolus started.-    0350_  Working @ bedside. SVE 7/100/-1/ IUPC placed. Orders received for amnioinfusion 300 ml x1.    0434_ Pitocin restarted @ 1 per MD orders.    0620_ Dr Abrams called to bedside for PD. Pitocin off, pt repositioned from right to left and left to right. O2 by mask and IV fluids wide open. SVE 8cm.    0630_ C/S called.    0650_ Pt transferred to OR 2.    0700_ Report to GIANCARLO Aly RN.

## 2019-12-29 NOTE — H&P
DATE OF ADMISSION:  2019    HISTORY OF PRESENT ILLNESS:  This is a 34-year-old  1, para 0 at 39   weeks and 0 days' who presents with prelabor rupture of membranes at   approximately 3:00 p.m. this afternoon.  She reports occasional contractions.    She denies vaginal bleeding.  Her goal is to feel no pain during the labor   Process. She denies HA/RUQ pain/scotoma.     Prenatal care has been with Dr. Montgomery.  Her ANDREA is 2020.  She is blood   type O negative, AST negative.  Hep B surface antigen negative, HIV negative,   gonorrhea and chlamydia negative, RPR nonreactive, and GBS negative.    Ultrasound showed a normal fetal anatomical survey.  Ultrasound was also   notable for fibroid uterus.    PAST MEDICAL HISTORY:  1.  Fibroid uterus.  2.  Polycystic ovary syndrome.  3.  Hypothyroidism.    PAST SURGICAL HISTORY:  None.    MEDICATIONS:  1.  Prenatal vitamins.  2.  Levothyroxine 25 mcg p.o.    ALLERGIES:  NKDA.    GYNECOLOGIC HISTORY:  No history of sexually transmitted disease or HSV.    OBSTETRICAL HISTORY:  The patient is  1.    SOCIAL HISTORY:  She denies tobacco, alcohol, or drugs.    OBJECTIVE:  VITAL SIGNS:  Afebrile.  Vital signs stable.  Blood pressure is ranging from   126-162/85-94.  GENERAL:  She is a well-developed, well-nourished female in no acute distress.  ABDOMEN:  Gravid.  PELVIC:  Sterile vaginal exam per nursing shows that she is grossly ruptured.    She is closed, 50% effaced, -3, vertex presentation.  Tocometer shows   irregular contractions.  Fetal heart tones baseline 150s with moderate   long-term variability, accels present, no variables, no decels.    LABORATORY DATA:  CBC:  White blood cell count 9.3, hemoglobin 15, hematocrit   46, and platelets 156.    ASSESSMENT AND PLAN:  This is a 34-year-old  1, para 0 at 38 weeks with   prelabor rupture of membranes.  1.  Patient has had rupture of membranes without evidence of labor.  She will   be augmented with  Pitocin.  2.  Blood pressure is mildly elevated.  PIH labs were unremarkable.  Diagnosis is currently consistent with   gestational hypertension.  3.  Category 1 tracing.  4.  Plans epidural.  5.  Group B streptococcus negative.       ____________________________________     MD IMAN CHAN / SITA    DD:  12/28/2019 19:34:42  DT:  12/28/2019 20:24:16    D#:  [id number]  Job#:  [id number]

## 2019-12-30 PROCEDURE — 700102 HCHG RX REV CODE 250 W/ 637 OVERRIDE(OP): Performed by: ANESTHESIOLOGY

## 2019-12-30 PROCEDURE — 700112 HCHG RX REV CODE 229: Performed by: OBSTETRICS & GYNECOLOGY

## 2019-12-30 PROCEDURE — A9270 NON-COVERED ITEM OR SERVICE: HCPCS | Performed by: OBSTETRICS & GYNECOLOGY

## 2019-12-30 PROCEDURE — 770002 HCHG ROOM/CARE - OB PRIVATE (112)

## 2019-12-30 PROCEDURE — A9270 NON-COVERED ITEM OR SERVICE: HCPCS | Performed by: ANESTHESIOLOGY

## 2019-12-30 PROCEDURE — 700102 HCHG RX REV CODE 250 W/ 637 OVERRIDE(OP): Performed by: OBSTETRICS & GYNECOLOGY

## 2019-12-30 RX ORDER — IBUPROFEN 600 MG/1
600 TABLET ORAL EVERY 6 HOURS PRN
Status: DISCONTINUED | OUTPATIENT
Start: 2019-12-30 | End: 2020-01-02 | Stop reason: HOSPADM

## 2019-12-30 RX ORDER — OXYCODONE AND ACETAMINOPHEN 10; 325 MG/1; MG/1
1 TABLET ORAL EVERY 4 HOURS PRN
Status: DISCONTINUED | OUTPATIENT
Start: 2019-12-30 | End: 2020-01-02 | Stop reason: HOSPADM

## 2019-12-30 RX ORDER — OXYCODONE HYDROCHLORIDE AND ACETAMINOPHEN 5; 325 MG/1; MG/1
1 TABLET ORAL EVERY 4 HOURS PRN
Status: DISCONTINUED | OUTPATIENT
Start: 2019-12-30 | End: 2020-01-02 | Stop reason: HOSPADM

## 2019-12-30 RX ORDER — ONDANSETRON 4 MG/1
4 TABLET, ORALLY DISINTEGRATING ORAL EVERY 6 HOURS PRN
Status: DISCONTINUED | OUTPATIENT
Start: 2019-12-30 | End: 2020-01-02 | Stop reason: HOSPADM

## 2019-12-30 RX ORDER — ONDANSETRON 2 MG/ML
4 INJECTION INTRAMUSCULAR; INTRAVENOUS EVERY 6 HOURS PRN
Status: DISCONTINUED | OUTPATIENT
Start: 2019-12-30 | End: 2020-01-02 | Stop reason: HOSPADM

## 2019-12-30 RX ADMIN — ACETAMINOPHEN 1000 MG: 500 TABLET ORAL at 04:54

## 2019-12-30 RX ADMIN — DOCUSATE SODIUM 100 MG: 100 CAPSULE, LIQUID FILLED ORAL at 11:46

## 2019-12-30 RX ADMIN — OXYCODONE AND ACETAMINOPHEN 1 TABLET: 10; 325 TABLET ORAL at 17:04

## 2019-12-30 RX ADMIN — OXYCODONE AND ACETAMINOPHEN 1 TABLET: 5; 325 TABLET ORAL at 21:12

## 2019-12-30 RX ADMIN — IBUPROFEN 600 MG: 600 TABLET ORAL at 17:31

## 2019-12-30 RX ADMIN — IBUPROFEN 600 MG: 600 TABLET ORAL at 11:46

## 2019-12-30 RX ADMIN — OXYCODONE HYDROCHLORIDE 10 MG: 10 TABLET ORAL at 04:54

## 2019-12-30 NOTE — CARE PLAN
Problem: Altered physiologic condition related to postoperative  delivery  Goal: Patient physiologically stable as evidenced by normal lochia, palpable uterine involution and vital signs within normal limits  Outcome: PROGRESSING AS EXPECTED  Note:   Fundus firm, lochia light     Problem: Potential for postpartum infection related to surgical incision, compromised uterine condition, urinary tract or respiratory compromise  Goal: Patient will be afebrile and free from signs and symptoms of infection  Outcome: PROGRESSING AS EXPECTED  Note:   VSS. No signs or symptoms of infection noted.

## 2019-12-30 NOTE — PROGRESS NOTES
POD#1    S: pain controlled. Lochia normal. Mosley out and has voided. +flatus but no BM. Candace po .     O: /89   Pulse (!) 101   Temp 36.9 °C (98.5 °F) (Temporal)   Resp 18   Ht 1.524 m (5')   Wt 72.6 kg (160 lb)   SpO2 95%   Gen: NAD  Fundus firm below umbilicus  Incision: Bandage c/d/i  Ext: no c/c/e    Labs: O neg RI, GBS neg, Hbg 15.1-->13.4    A/P 35yo  s/p LTCS for FIOL, Gestational HTN  1. GHTN: Appears resolved.   2. Routine post op care. Pressure dressing to be removed today   3. RH negative: Rhogam not indicated  4. Home in 1-2 days

## 2019-12-30 NOTE — PROGRESS NOTES
0900 Assessment completed, fundus firm, lochia light. ABD incision with dressing intact. POC reviewed, verbalized understanding. Denies pain at this time, will call if pain med intervention needed.   1100 ABD dressing removed, blisters present where tape was located. INJOY card given, declined assistance for activation at this time.

## 2019-12-30 NOTE — CARE PLAN
Problem: Safety  Goal: Will remain free from injury  Outcome: PROGRESSING AS EXPECTED  Note:   Pt re-educated about use of call light if any assistance is needed. Pt. Is able to ambulate without any assistance at this time.      Problem: Infection  Goal: Will remain free from infection  Outcome: PROGRESSING AS EXPECTED  Note:   Infant remains afebrile at this time.  Infant shows no other s/s of infection.

## 2019-12-30 NOTE — PROGRESS NOTES
Pt arrived via gurney with belongings to room S310. Report received from Jennie Quiñonez RN. Pt oriented to room, call light & infant security. Assessment done. Fundus firm, lochia light. Vital signs stable. IV infusing 125 of pitocin. Pt states pain is tolerable, see MAR. Pt aware of dangers related to sleeping with infant, reviewed plan of care with pt & encouraged to call with needs or prior to ambulating. Call light in place. Will continue to monitor.

## 2019-12-30 NOTE — PROGRESS NOTES
1900-- Received report from JESSIE Beth, Infant at bedside in open crib no signs of distress.  Pt resting in bed. Discussed pain management for the day.  No further needs at the time.  Call light within reach, bed locked and in lowest position.  Rounding in place.    2230-- Assessment completed, VSS.  Discussed plan of care for the night that pt is comfortable with.  Pt did not want to ambulate this evening, educated pt on importance of ambulation.  Pt agreed to ambulate to sink to wash her hands.  All questions answered at this time.  Will continue to monitor.

## 2019-12-31 PROCEDURE — A9270 NON-COVERED ITEM OR SERVICE: HCPCS | Performed by: OBSTETRICS & GYNECOLOGY

## 2019-12-31 PROCEDURE — 770002 HCHG ROOM/CARE - OB PRIVATE (112)

## 2019-12-31 PROCEDURE — 302151 K-PAD 14X20: Performed by: OBSTETRICS & GYNECOLOGY

## 2019-12-31 PROCEDURE — 700112 HCHG RX REV CODE 229: Performed by: OBSTETRICS & GYNECOLOGY

## 2019-12-31 PROCEDURE — 302131 K PAD MOTOR: Performed by: OBSTETRICS & GYNECOLOGY

## 2019-12-31 PROCEDURE — 700102 HCHG RX REV CODE 250 W/ 637 OVERRIDE(OP): Performed by: OBSTETRICS & GYNECOLOGY

## 2019-12-31 RX ADMIN — OXYCODONE AND ACETAMINOPHEN 1 TABLET: 5; 325 TABLET ORAL at 16:41

## 2019-12-31 RX ADMIN — SILVER SULFADIAZINE 1 G: 10 CREAM TOPICAL at 21:16

## 2019-12-31 RX ADMIN — DOCUSATE SODIUM 100 MG: 100 CAPSULE, LIQUID FILLED ORAL at 08:07

## 2019-12-31 RX ADMIN — IBUPROFEN 600 MG: 600 TABLET ORAL at 16:41

## 2019-12-31 RX ADMIN — IBUPROFEN 600 MG: 600 TABLET ORAL at 23:01

## 2019-12-31 RX ADMIN — OXYCODONE AND ACETAMINOPHEN 1 TABLET: 5; 325 TABLET ORAL at 08:07

## 2019-12-31 RX ADMIN — OXYCODONE AND ACETAMINOPHEN 1 TABLET: 10; 325 TABLET ORAL at 03:48

## 2019-12-31 RX ADMIN — IBUPROFEN 600 MG: 600 TABLET ORAL at 10:41

## 2019-12-31 RX ADMIN — LEVOTHYROXINE SODIUM 25 MCG: 25 TABLET ORAL at 05:42

## 2019-12-31 RX ADMIN — OXYCODONE AND ACETAMINOPHEN 1 TABLET: 5; 325 TABLET ORAL at 12:17

## 2019-12-31 RX ADMIN — SILVER SULFADIAZINE 0.5 G: 10 CREAM TOPICAL at 08:21

## 2019-12-31 RX ADMIN — OXYCODONE AND ACETAMINOPHEN 1 TABLET: 5; 325 TABLET ORAL at 20:45

## 2019-12-31 RX ADMIN — SIMETHICONE CHEW TAB 80 MG 80 MG: 80 TABLET ORAL at 18:46

## 2019-12-31 RX ADMIN — VITAMIN A, VITAMIN C, VITAMIN D-3, VITAMIN E, VITAMIN B-1, VITAMIN B-2, NIACIN, VITAMIN B-6, CALCIUM, IRON, ZINC, COPPER 1 TABLET: 4000; 120; 400; 22; 1.84; 3; 20; 10; 1; 12; 200; 27; 25; 2 TABLET ORAL at 05:42

## 2019-12-31 RX ADMIN — IBUPROFEN 600 MG: 600 TABLET ORAL at 03:48

## 2019-12-31 NOTE — PROGRESS NOTES
Progress Note    Milli Santizo   Post-op day 2  Chief Admitting Dx: Pregnancy  Labor and delivery indication for care or intervention  Delivery Type:  for fetal distress      Subjective:  Ambulating: yes  Tolerating oral feed: yes  Flatus: yes    Voiding: no  Dizziness: no  Vitals:    19 0100 19 0200 19 0600 19 0600   BP:  112/79 115/89 125/85   Pulse: 92 (!) 105 (!) 101 81   Resp: 18 20 18 19   Temp:  37 °C (98.6 °F) 36.9 °C (98.5 °F) 36.4 °C (97.6 °F)   TempSrc:  Temporal Temporal Temporal   SpO2: 96% 99% 95% 96%   Weight:       Height:           Exam:  Abdomen: Abdomen soft, non-tender. BS normal. No masses,  No organomegaly  Incision: dry and intact  Fundus Tenderness: no  Below umbilicus: yes  Perineum: perineum intact  Extremities: Normal  Lochia: mild    Labs:   No results found for this or any previous visit (from the past 24 hour(s)).    Assessment:  Continue Routine postpartum care      Plan:  Advance Diet and Encourange Ambulation    Arielle Montgomery M.D.

## 2019-12-31 NOTE — CARE PLAN
Problem: Altered physiologic condition related to postoperative  delivery  Goal: Patient physiologically stable as evidenced by normal lochia, palpable uterine involution and vital signs within normal limits  Outcome: PROGRESSING AS EXPECTED  Intervention: Perform physical assessment and obtain vital signs on patient as directed in Intrapartum/Postpartum Standard of Care in Policy and Procedure manual  Note:   VSS. Fundus firm with light lochia. Patient denies any heavy bleeding.      Problem: Potential for postpartum infection related to surgical incision, compromised uterine condition, urinary tract or respiratory compromise  Goal: Patient will be afebrile and free from signs and symptoms of infection  Outcome: PROGRESSING AS EXPECTED  Note:   Pt remains afebrile. No signs or symptoms of infection. Incision looks well approximated. No erythema or drainage noted.

## 2019-12-31 NOTE — PROGRESS NOTES
Patient assessment completed. Discussed pain management plan and patient requests pain medication be provided as available. Patient denies dizziness and headaches; states she is voiding w/o difficulty and passing small amounts of flatus. Pt states she has not ambulated more than to and from restroom or chair. Discussed importance of ambulation and pt encouraged to walk at least four times per shift. Reviewed plan of care, all questions answered, and rounding in place.

## 2019-12-31 NOTE — PROGRESS NOTES
Received bedside report from JESSIE Wilson. Patient sitting up in chair, declines pain at this time and will call when needing pain medication. Whiteboards updated, POC discussed. Call light within reach. Patient encouraged to call with any needs and or concerns.

## 2019-12-31 NOTE — LACTATION NOTE
followup visit. Baby asleep in bassinette, MOB in chair to prepare for breastfeeding. MOB taught hand expression, nipples intact bilaterally. MOB reports baby is breastfeeding well, but she struggles to latch her to breast. Baby awakened by FOB and unwrapped, MOB assisted to adjust pillows for better football positioning. MOB leaning into baby, so taught how to bring baby to breast for deep latch.  Baby brought nipple to nose and MOB sandwiches breast for a deep comfortable latch. Frequent swallowing observed and audible. MOB has many questions regarding frequency and length of feedings. SHe is feeding on cue, and at least every 3 hours. Encouraged to continue to feed on cue and at least 8 times every 24 hours. MOB reports she is breastfeeding on one breast per feeding. I discussed with parents need to offer the 2nd breast at every feeding. Discussed how to manage full or engorged breast and MOB encouraged to pump if breasts feel full or engorged. Baby is slightly jaundiced today with bilizap of 11.7; MOB has history of hypothyroid and PCOS. MOB's feet and ankles are edematous. I discussed with MOB the importance of fluid intake and ambulating to help decrease swelling which can cause delayed milk production. MOB and FOB voice understanding. MOB taught how to break suction when unlatching baby. Injoy apps downloaded with my assist. I taught both parents stool color changes to expect by day 4-5; and discussed cluster feedings and growth spurts.   Encouraged to call for 1:1 lactation consult post d/c to monitor milk transfer and milk production due to medical history.   Plan:  Feed on cue or at least 8 times every 24 hours  Offer both breasts to baby at every feeding  Nipple to nose positioning, bring baby to breast, not breast to baby.   Allow baby to stay at breast as long as she is deeply latched and suckling.  Break vacuum suction when latch is uncomfortable  Call for latch assist/assessment at least twice every  day  Call for outpt lactation appointment for early next week to monitor milk intake and weight gain.

## 2020-01-01 PROCEDURE — A9270 NON-COVERED ITEM OR SERVICE: HCPCS | Performed by: OBSTETRICS & GYNECOLOGY

## 2020-01-01 PROCEDURE — 770002 HCHG ROOM/CARE - OB PRIVATE (112)

## 2020-01-01 PROCEDURE — 700112 HCHG RX REV CODE 229: Performed by: OBSTETRICS & GYNECOLOGY

## 2020-01-01 PROCEDURE — 700102 HCHG RX REV CODE 250 W/ 637 OVERRIDE(OP): Performed by: OBSTETRICS & GYNECOLOGY

## 2020-01-01 RX ADMIN — Medication 1 G: at 10:54

## 2020-01-01 RX ADMIN — IBUPROFEN 600 MG: 600 TABLET ORAL at 23:13

## 2020-01-01 RX ADMIN — VITAMIN A, VITAMIN C, VITAMIN D-3, VITAMIN E, VITAMIN B-1, VITAMIN B-2, NIACIN, VITAMIN B-6, CALCIUM, IRON, ZINC, COPPER 1 TABLET: 4000; 120; 400; 22; 1.84; 3; 20; 10; 1; 12; 200; 27; 25; 2 TABLET ORAL at 06:31

## 2020-01-01 RX ADMIN — OXYCODONE AND ACETAMINOPHEN 1 TABLET: 5; 325 TABLET ORAL at 18:15

## 2020-01-01 RX ADMIN — OXYCODONE AND ACETAMINOPHEN 1 TABLET: 10; 325 TABLET ORAL at 02:46

## 2020-01-01 RX ADMIN — OXYCODONE AND ACETAMINOPHEN 1 TABLET: 5; 325 TABLET ORAL at 14:30

## 2020-01-01 RX ADMIN — IBUPROFEN 600 MG: 600 TABLET ORAL at 10:43

## 2020-01-01 RX ADMIN — DOCUSATE SODIUM 100 MG: 100 CAPSULE, LIQUID FILLED ORAL at 00:52

## 2020-01-01 RX ADMIN — IBUPROFEN 600 MG: 600 TABLET ORAL at 05:09

## 2020-01-01 RX ADMIN — Medication 1 G: at 18:15

## 2020-01-01 RX ADMIN — OXYCODONE AND ACETAMINOPHEN 1 TABLET: 10; 325 TABLET ORAL at 06:31

## 2020-01-01 RX ADMIN — OXYCODONE AND ACETAMINOPHEN 1 TABLET: 5; 325 TABLET ORAL at 10:32

## 2020-01-01 RX ADMIN — LEVOTHYROXINE SODIUM 25 MCG: 25 TABLET ORAL at 06:30

## 2020-01-01 RX ADMIN — DOCUSATE SODIUM 100 MG: 100 CAPSULE, LIQUID FILLED ORAL at 22:16

## 2020-01-01 RX ADMIN — IBUPROFEN 600 MG: 600 TABLET ORAL at 17:10

## 2020-01-01 RX ADMIN — OXYCODONE AND ACETAMINOPHEN 1 TABLET: 5; 325 TABLET ORAL at 22:14

## 2020-01-01 ASSESSMENT — EDINBURGH POSTNATAL DEPRESSION SCALE (EPDS)
I HAVE BEEN SO UNHAPPY THAT I HAVE HAD DIFFICULTY SLEEPING: NOT VERY OFTEN
I HAVE BEEN ABLE TO LAUGH AND SEE THE FUNNY SIDE OF THINGS: AS MUCH AS I ALWAYS COULD
I HAVE FELT SAD OR MISERABLE: NOT VERY OFTEN
THE THOUGHT OF HARMING MYSELF HAS OCCURRED TO ME: NEVER
I HAVE BLAMED MYSELF UNNECESSARILY WHEN THINGS WENT WRONG: YES, SOME OF THE TIME
I HAVE LOOKED FORWARD WITH ENJOYMENT TO THINGS: AS MUCH AS I EVER DID
I HAVE BEEN ANXIOUS OR WORRIED FOR NO GOOD REASON: YES, SOMETIMES
I HAVE FELT SCARED OR PANICKY FOR NO GOOD REASON: NO, NOT MUCH
I HAVE BEEN SO UNHAPPY THAT I HAVE BEEN CRYING: ONLY OCCASIONALLY
THINGS HAVE BEEN GETTING ON TOP OF ME: YES, SOMETIMES I HAVEN'T BEEN COPING AS WELL AS USUAL
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
## 2020-01-01 NOTE — CARE PLAN
Problem: Altered physiologic condition related to postoperative  delivery  Goal: Patient physiologically stable as evidenced by normal lochia, palpable uterine involution and vital signs within normal limits  Outcome: PROGRESSING AS EXPECTED  Note:   Patient is physiologically stable as evidenced by scant lochia rubra, firm fundus one fingerbreadth below the umbilicus, and vital signs WDL.      Problem: Alteration in comfort related to surgical incision and/or after birth pains  Goal: Patient is able to ambulate, care for self and infant with acceptable pain level  Outcome: PROGRESSING AS EXPECTED  Note:   Discussed pain management and verbalization of pain utilizing the 0-10 pain scale. White board updated with times of pain medication availability and pt requests pain medication be provided as available. Discussed non-pharmacological pain control therapies including splinting with a pillow and light abdominal stretching. Pt provided K-Pad and abdominal binder per request. Pt ambulates with a steady gait and demonstrates the ability to participate in ADLs and provide care for  daughter.

## 2020-01-01 NOTE — LACTATION NOTE
This note was copied from a baby's chart.  Follow-up visit, mother reports some pinching/discomfort when baby breastfeeds and requests help achieving a deep latch, during breast assessment mother's breast tissue was soft and pliable bilaterally and there were no signs or symptoms of mastitis, while LC was present mother was unable to latch baby independently for an effective feeding, assisted with and educated on proper positioning for a deep latch, after just a few attempts a deep latch with widely-flanged lips and a nutritive suck achieved, reminded mother of the importance of a deep latch and the potential for breast damage and/or inadequate milk transfer with a shallow latch, mother reports they began supplementing with formula last night due to infant weight loss, baby has had a 10.94% weight loss last night, a few very small drops of colostrum noted using hand expression, parents educated on proper hand expression technique, parents state they were not previously offered DBM, informed that DBM is offered for use when a medical necessity, informed that since we have encouraged supplementation due to weight loss they may use DBM if desired, parents assured that we will support whichever they decide upon, encouraged mother to provide frequent stimulation to maintain infant wakefulness for breastfeeding, mother continually fell asleep and began snoring during breastfeeding, educated parents on the importance of remaining awake and alert when feeding/holding baby, mother states she would like to try pumping but is too tired right, states she may try to pump after the next feeding, breast pump set up, verbal and written education provided to parents on proper pump use and settings, encouraged POB to attempt to burp baby after finished feeding on the left breast, after burping baby latched easily with assistance of LC, mother was unable to effectively latch infant herself    Plan:  Q 3 hours, more often if feeding  cues noted, attempt to breastfeed  Pump for 15 minutes if desired  Supplement per guidelines    Supplement guidelines provided and explained    Mother states she has both a Haakaa and a Medela personal pump for home use if needed    Encouraged to call for assistance as needed

## 2020-01-01 NOTE — CARE PLAN
Problem: Altered physiologic condition related to postoperative  delivery  Goal: Patient physiologically stable as evidenced by normal lochia, palpable uterine involution and vital signs within normal limits  Outcome: PROGRESSING AS EXPECTED  Note:   Fundus firm, lochia light. VSS.      Problem: Potential for postpartum infection related to surgical incision, compromised uterine condition, urinary tract or respiratory compromise  Goal: Patient will be afebrile and free from signs and symptoms of infection  Outcome: PROGRESSING AS EXPECTED  Note:   No s/s of infection. VSS.

## 2020-01-01 NOTE — PROGRESS NOTES
Patient assessment completed. Discussed pain management plan and patient requests pain medication be provided as available. Patient denies dizziness and headaches; states she is voiding w/o difficulty and passing flatus. Reviewed plan of care, all questions answered, and rounding in place.

## 2020-01-01 NOTE — CARE PLAN
Problem: Altered physiologic condition related to postoperative  delivery  Goal: Patient physiologically stable as evidenced by normal lochia, palpable uterine involution and vital signs within normal limits  Outcome: PROGRESSING AS EXPECTED  Note:   Patient is physiologically stable as evidenced by scant lochia rubra, firm fundus at the umbilicus, and vital signs WDL.      Problem: Alteration in comfort related to surgical incision and/or after birth pains  Goal: Patient is able to ambulate, care for self and infant with acceptable pain level  Outcome: PROGRESSING AS EXPECTED  Note:   Discussed pain management and verbalization of pain utilizing the 0-10 pain scale. White board updated with times of pain medication availability and pt requests pain medication be provided as available. Discussed non-pharmacological pain control therapies including splinting with a pillow and light abdominal stretching. Pt provided heat packs and K Pad for lower back pain per request. Pt ambulates with a steady gait and demonstrates the ability to participate in ADLs and provide care for  daughter.

## 2020-01-01 NOTE — PROGRESS NOTES
Received report from JESSIE Camarillo. Infant at bedside in open crib no signs of distress. Pt resting in bed, discussed plan of care and pain management for the night. Pt states she will call staff if she requires pain interventions or assistance. PRN medication administered per MAR. No further needs at this time.

## 2020-01-02 VITALS
DIASTOLIC BLOOD PRESSURE: 99 MMHG | SYSTOLIC BLOOD PRESSURE: 138 MMHG | WEIGHT: 160 LBS | HEIGHT: 60 IN | OXYGEN SATURATION: 96 % | BODY MASS INDEX: 31.41 KG/M2 | RESPIRATION RATE: 17 BRPM | TEMPERATURE: 97.6 F | HEART RATE: 89 BPM

## 2020-01-02 PROCEDURE — 700102 HCHG RX REV CODE 250 W/ 637 OVERRIDE(OP): Performed by: OBSTETRICS & GYNECOLOGY

## 2020-01-02 PROCEDURE — A9270 NON-COVERED ITEM OR SERVICE: HCPCS | Performed by: OBSTETRICS & GYNECOLOGY

## 2020-01-02 RX ORDER — IBUPROFEN 600 MG/1
600 TABLET ORAL EVERY 6 HOURS PRN
Qty: 30 TAB | Refills: 0 | Status: SHIPPED | OUTPATIENT
Start: 2020-01-02 | End: 2020-08-12

## 2020-01-02 RX ORDER — OXYCODONE HYDROCHLORIDE AND ACETAMINOPHEN 5; 325 MG/1; MG/1
1 TABLET ORAL EVERY 4 HOURS PRN
Qty: 25 TAB | Refills: 0 | Status: SHIPPED | OUTPATIENT
Start: 2020-01-02 | End: 2020-01-07

## 2020-01-02 RX ADMIN — IBUPROFEN 600 MG: 600 TABLET ORAL at 05:12

## 2020-01-02 RX ADMIN — LEVOTHYROXINE SODIUM 25 MCG: 25 TABLET ORAL at 06:29

## 2020-01-02 RX ADMIN — IBUPROFEN 600 MG: 600 TABLET ORAL at 10:59

## 2020-01-02 RX ADMIN — VITAMIN A, VITAMIN C, VITAMIN D-3, VITAMIN E, VITAMIN B-1, VITAMIN B-2, NIACIN, VITAMIN B-6, CALCIUM, IRON, ZINC, COPPER 1 TABLET: 4000; 120; 400; 22; 1.84; 3; 20; 10; 1; 12; 200; 27; 25; 2 TABLET ORAL at 06:29

## 2020-01-02 RX ADMIN — OXYCODONE AND ACETAMINOPHEN 1 TABLET: 5; 325 TABLET ORAL at 06:29

## 2020-01-02 RX ADMIN — Medication 1 G: at 06:29

## 2020-01-02 RX ADMIN — OXYCODONE AND ACETAMINOPHEN 1 TABLET: 5; 325 TABLET ORAL at 10:59

## 2020-01-02 RX ADMIN — OXYCODONE AND ACETAMINOPHEN 1 TABLET: 5; 325 TABLET ORAL at 02:28
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
## 2020-01-02 NOTE — DISCHARGE PLANNING
Discharge Planning Assessment Post Partum     Reason for Referral: MOB scored a 10 on the EPDS   Address: [address] [place], [place] [address] [facility] [address]  Type of Living Situation: Apartment with FOB  Mom Diagnosis: Pregnancy   Baby Diagnosis: [place]   Primary Language: English      Name of Baby: Amber Hernandez  Father of the Baby: Talon Hernandez  Involved in baby’s care? Yes  Contact Information: [phone]     Prenatal Care: Yes  Mom's PCP: None, provided PCP resources  PCP for new baby: Provided Pediatrician resources      Support System: Yes  Coping/Bonding between mother & baby: Yes  Source of Feeding: Breast  Supplies for Infant: Prepared     Mom's Insurance: [employer]   Baby Covered on Insurance: FOB is putting MOB and baby on his [place] insurance   Mother Employed/School: Yes  Other children in the home/names & ages:No      Financial Hardship/Income: No  Mom's Mental status: Alert and Oriented x 4  Services used prior to admit: None     CPS History: Denies  Psychiatric History: Hx of anxiety.  MOB reported she has seen a therapist in the past but was interested in finding a new therapist.       Domestic Violence History: Denies  Drug/ETOH History: Denies     Resources Provided: Postpartum, Behavioral Health resources, Pediatric list, Pediatrician, Family resources in [facility]  Referrals Made: None       Clearance for Discharge: Baby is clear to discharge home with MOB/FOB upon medical clearance.     Ongoing Plan: No further social work needs at this time.

## 2020-01-02 NOTE — CARE PLAN
Problem: Altered physiologic condition related to postoperative  delivery  Goal: Patient physiologically stable as evidenced by normal lochia, palpable uterine involution and vital signs within normal limits  Outcome: PROGRESSING AS EXPECTED  Note:   Patient is physiologically stable as evidenced by scant lochia rubra, firm fundus one fingerbreadth below the umbilicus, and vital signs WDL.      Problem: Potential for postpartum infection related to surgical incision, compromised uterine condition, urinary tract or respiratory compromise  Goal: Patient will be afebrile and free from signs and symptoms of infection  Outcome: PROGRESSING AS EXPECTED  Note:   Patient is afebrile and absent for other signs/symptoms of infection. Vital signs WDL.

## 2020-01-02 NOTE — DISCHARGE INSTRUCTIONS
POSTPARTUM DISCHARGE INSTRUCTIONS FOR MOM    YOB: 1985   Age: 34 y.o.               Admit Date: 2019     Discharge Date: 2020  Attending Doctor:  Arielle Montgomery M.D.                  Allergies:  Patient has no known allergies.    Discharged to home by car. Discharged via wheelchair, hospital escort: Yes.  Special equipment needed: Not Applicable  Belongings with: Personal  Be sure to schedule a follow-up appointment with your primary care doctor or any specialists as instructed.     Discharge Plan:   Influenza Vaccine Indication: Not indicated: Previously immunized this influenza season and > 8 years of age    REASONS TO CALL YOUR OBSTETRICIAN:  1.   Persistent fever or shaking chills (Temperature higher than 100.4)  2.   Heavy bleeding (soaking more than 1 pad per hour); Passing clots  3.   Foul odor from vagina  4.   Mastitis (Breast infection; breast pain, chills, fever, redness)  5.   Urinary pain, burning or frequency  6.   Severe depression longer than 24 hours  7.   Abdominal incision infection    HAND WASHING  · Prior to handling the baby.  · Before breastfeeding or bottle feeding baby.  · After using the bathroom or changing the baby's diaper.    WOUND CARE  Ask your physician for additional care instructions.  In general:    ·  Incision:      · Keep clean and dry.    · Do NOT lift anything heavier than your baby for up to 6 weeks.    · There should not be any opening or pus.      VAGINAL CARE  · Nothing inside vagina for 6 weeks: no sexual intercourse, tampons or douching.  · Bleeding may continue for 2-4 weeks.  Amount may vary.    · Call your physician for heavy bleeding which means soaking more than 1 pad per hour    BIRTH CONTROL  · It is possible to become pregnant at any time after delivery and while breastfeeding.  · Plan to discuss a method of birth control with your physician at your follow up visit. visit.    DIET AND ELIMINATION  · Eating more fiber (bran  "cereal, fruits, and vegetables) and drinking plenty of fluids will help to avoid constipation.  · Urinary frequency after childbirth is normal.    POSTPARTUM BLUES  During the first few days after birth, you may experience a sense of the \"blues\" which may include impatience, irritability or even crying.  These feeling come and go quickly.  However, as many as 1 in 10 women experience emotional symptoms known as postpartum depression.    Postpartum depression:  May start as early as the second or third day after delivery or take several weeks or months to develop.  Symptoms of \"blues\" are present, but are more intense:  Crying spells; loss of appetite; feelings of hopelessness or loss of control; fear of touching the baby; over concern or no concern at all about the baby; little or no concern about your own appearance/caring for yourself; and/or inability to sleep or excessive sleeping.  Contact your physician if you are experiencing any of these symptoms.    Crisis Hotline:  · [place] Crisis Hotline:  3-479-TLKXTBM  Or 1-472.501.6784  · [place] Crisis Hotline:  1-587.787.9419  Or 090-829-9623    PREVENTING SHAKEN BABY:  If you are angry or stressed, PUT THE BABY IN THE CRIB, step away, take some deep breaths, and wait until you are calm to care for the baby.  DO NOT SHAKE THE BABY.  You are not alone, call a supporter for help.    · Crisis [facility] Center 24/7 crisis line 739-547-2204 or 1-700.653.7977  · You can also text them, text \"ANSWER\" to 318551    DEPRESSION / SUICIDE RISK:  As you are discharged from this [facility] facility, it is important to learn how to keep safe from harming yourself.    Recognize the warning signs:  · Abrupt changes in personality, positive or negative- including increase in energy   · Giving away possessions  · Change in eating patterns- significant weight changes-  positive or negative  · Change in sleeping patterns- unable to sleep or sleeping all the time   · Unwillingness or " inability to communicate  · Depression  · Unusual sadness, discouragement and loneliness  · Talk of wanting to die  · Neglect of personal appearance   · Rebelliousness- reckless behavior  · Withdrawal from people/activities they love  · Confusion- inability to concentrate     If you or a loved one observes any of these behaviors or has concerns about self-harm, here's what you can do:  · Talk about it- your feelings and reasons for harming yourself  · Remove any means that you might use to hurt yourself (examples: pills, rope, extension cords, firearm)  · Get professional help from the community (Mental Health, Substance Abuse, psychological counseling)  · Do not be alone:Call your Safe Contact- someone whom you trust who will be there for you.  · Call your local CRISIS HOTLINE 171-9701 or 342-991-4607  · Call your local Children's Mobile Crisis Response Team [place] (914) 600-6315 or www.[facility] Outreach Program  · Call the toll free National Suicide Prevention Hotlines   · National Suicide Prevention Lifeline 512-874-IXVM (0956)  · National Hope Line Network 800-SUICIDE (626-9966)    DISCHARGE SURVEY:  [facility] you for [facility]  We hope we provided you with very good care.  You may be receiving a survey in the mail.  Please fill it out.  Your opinion is valuable to us.    ADDITIONAL EDUCATIONAL MATERIALS GIVEN TO PATIENT: NONE         My signature on this form indicates that:  1.  I have reviewed and understand the above information  2.  My questions regarding this information have been answered to my satisfaction.  3.  I have formulated a plan with my discharge nurse to obtain my prescribed medication for home.

## 2020-01-02 NOTE — PROGRESS NOTES
Patient assessment completed. Discussed pain management plan and patient requests pain medication as available. Patient denies dizziness and headaches; states she is voiding w/o difficulty and passing flatus. Reviewed plan of care, all questions answered, and rounding in place.

## 2020-01-02 NOTE — PROGRESS NOTES
Received report from JESSIE Camarillo. Patient is currently breastfeeding her infant. Discussed plan of care and pain management for the night. Pt states she will call staff if she requires pain interventions or assistance. No further needs at this time.

## 2020-01-07 ENCOUNTER — ANESTHESIA (OUTPATIENT)
Dept: OBGYN | Facility: MEDICAL CENTER | Age: 35
End: 2020-01-07
Payer: COMMERCIAL

## 2020-01-24 ENCOUNTER — OFFICE VISIT (OUTPATIENT)
Dept: OBGYN | Facility: CLINIC | Age: 35
End: 2020-01-24
Payer: COMMERCIAL

## 2020-01-24 DIAGNOSIS — O92.70 LACTATION PROBLEM: ICD-10-CM

## 2020-01-24 DIAGNOSIS — O92.5 LACTATION SUPPRESSION: ICD-10-CM

## 2020-01-24 PROCEDURE — 99205 OFFICE O/P NEW HI 60 MIN: CPT | Performed by: NURSE PRACTITIONER

## 2020-01-24 NOTE — PROGRESS NOTES
"Subjective:       Milli Santizo is a 34 y.o. female here to establish care and to discuss lactation. She is here today with baby and  (Cuco).    Concerns:   Latch on difficulties , nipple pain  and feeling that there is not enough milk  Gassy baby,\"hold me \" baby    HPI:   Pertinent  history: c/section  Mother does not have a history of advanced maternal age, GDM, hypertension prior to pregnancy and multiple gestation. These are common conditions which may interfere in establishing milk supply.  Mother does have insulin resistance, PCOS and thyroid disease. These are common conditions which may interfere in establishing milk supply.  Breast changes in pregnancy: Yes  History of breast surgeries: No      FEEDING HISTORY:    Past breastfeeding history:  first baby   Hospital course: Difficulty latching, started supplement and pumping but infrequent. Seen by LC often plan provided for discharge family unable to get insurance to say they covered OP lactation.   Currently: Came to group where consult facilitated, nursing 6x/day, bottle 3x and topping off with bottle most feedings usually only 0/5 -0.75oz per time. Just started mothers milk tea  Both breasts: Yes 20 minutes each side  Bottle feeds: 3x/24h      Supplement: Supplementation initiated inpatient, Expressed breast milk and Formula  Quanity: 0.5oz to 2oz  How given/devices:  Bottle    Nipple Shield Use: None    Breast Pumping:   Rarely pumping  Frequency: as desired not to replace feedings or end of feedings  Type of pump: Medela with battery pack  Flange size/type: 24mm  NO pain with pumping    ROS:  Constitutional:   no fever, chills. Feeling well  Extremities Swelling: No extremity swelling  Gastrointestinal:  Negative for nausea, vomiting  Breasts: No soreness of breasts and Soreness of nipples  Psychiatric: No mood changes and Managing ok  Mental Health:  NOT Feeling irritable, agitated, angry, overwhelmed, apathy, exhaustion  NOT having " sleep changes, appetite changes       Objective:     General: no acute distress  Neurological:  Alert and oriented x3  Breasts: Symetrical , Soft, Plugged Duct - no evidence and Mastitis  - no S/S  Nipples: intact  Psychiatric:  Normal mood and affect. Her behavior is normal. Judgment and thought content normal   Mental Health:  Sadness, crying, feeling overwhelmed, agitation, hypervigilance NOT exhibited     Assessment/Plan & Lactation Counseling:     Infant Weight History:   19; 7#10.6oz  20: 8#14.5oz    Infant intake at Breast:: L 0.3oz +0.4oz     R 1.9oz    Total 2.6oz  Pumped: Type of Pump: Personal    Quantity Pumped:   Total 12ml~  Initiation of Feeding: Infant initiates  Position of Feeding:    Right: cross cradle  Left: cross cradle  Attachment Achieved: rapidly  Nipple shield: N/A       Suck Pattern at the breast: Suck burst and normal rest  Behavior Following Observed Feeding: content and sleeping  Nipple Pain from: Contact forces of the tongue causing nipple strain resulting in damage from shallow latch  Latch: Assisted latch  Suckling/Feeding: attaches, audible swallows, baby fed effectively, baby roots, elicits MAIC and rhythmic  Milk Transfer at this feedin.6oz  TOTAL   Effective breastfeeding  Milk Supply Available: low  Low milk supply:   Likely due to: ineffective or infrequent breast stimulation or milk removal    Discussed concerns and symptoms as listed above in assessment and guidance summarized below.  Topics reviewed included:  •  Herbs and medications for increasing supply and their potential side effects and efficacy. No evidence base exists to support their use Fenugreek is contraindicated in thyroid disease and insulin resistance.  •  Self Care. Support, rest, getting out of the house.  • Milk supply is dependent on how many times the baby removes milk and how well the breasts are emptied in a 24 hour period. This is a biological reality that we can't work around. If, for  any reason, your baby is not latching, or you are not able to nurse, then it is important for you to remove the milk instead by pumping or hand expression.  There's no magic trick, tea, cookie or supplement that will maintain your milk supply. One  must  effectively remove milk to continue to make and maximize milk. In the early days and weeks that can be 8+ times in 24 hours. For older babies, on average 6-7 + times in 24 hours.    •  Feeding: Managing feeding well.  Can stop topping off every feeding, can wear baby after a feeding walk her, take her outside.  •  Supplement: Continue 3 two ounce bottles and if no longer topping off we can start decreasing those as you increase supply as us you will be pumping 3 times per day if that is how often she gets full  formula bottles  •  Positioning Techniques for bare breast  o Suggested positions Football  On the right  o Fine tune position by making sure your fingers beneath the breast as well as your bra, are out of the way of your baby's chin.  o Positioning:  Many positions shown, great sidelying at 7 minutes.   o See http://globalhealthmedia.org/portfolio-items/positions-for-breastfeeding/?legmvyasdGE=36694  o  Latch on Techniques for bare breast  o Fine tune latch:  - By holding your baby more securely at the breast, assisting your baby to stay attached by:  - Bringing your baby to your breast, not breast to the baby  - Your baby's cheek to touch breast securely, nose tipped back  - Hold your baby firmly in place so when your baby forgets to suck and picks it back up again your baby is in the correct spot. You will be extinguishing behavior and replacing it with a deeper latch to stimulate suck and provide satisfaction at the breast  - Your baby needs as much breast as deep in the mouth as possible to allow your nipples to heal and for you more importantly to maximize efficiency at the breast  - Latch is asymmetrical, leading with the chin, getting more  "underneath.  •  Pumping guidelines:  o Both breasts using \"Hands-on Pumping\" for 10-15 minutes to stimulate milk production. See video at : http://newborns.[place].edu/Breastfeeding/MaxProduction.html.  o Pump 3 times in 24 hours or whenever giving a FULL formula feeding  o Type of pump:  - Personal  - http://www.[employer]/healthcare-professionals/videos/ameda-platinum-with-hygienikit-video  - Always double pump  o How long will vary woman to woman, typically 8-15 minutes, or 1-2 minutes after flow slows  o Flange Fit: Freely moving nipple in the tunnel with some movement of the areola.  - Today's evaluation indicates appropriate flange  •  Increasing supply besides Galactogogues and Pumping: Warmth, Relaxation , Physical, auditory narratives, Childbirth relaxation Techniques, Acupuncture and acupressure, Shoulder Massages and Take a nap  •  Connect with other mothers:  o [place]:   - [facility]: [email or web address]  - Well-Nourished Babies (Private group for questions and support): [email or web address]  o Breastfeeding [place] for support not assessment: Tuesday, Thursday and Saturday at 11am  •    Nipple care:  o May apply breastmilk  o Moist-oily ointment after feeding/pumping, ie Lanolin nipple butter, coconut or olive oil, if desired/needed 2-3 times/day until nipples are healed  You do not need to wash this off before pumping or feeding the baby   A shallow latch is causing the  Nipples to luba and vasospasm evidence by the whiteness and that is directly related to her latch.     Mom expressed understanding, and asked appropriate questions    Follow-up for infant weight check and dyad breastfeeding evaluation in 1 week(s)    A total of 75 minutes, this does not include infant assessment time, were spent face to face with more than 50% spent counseling and coordinating care as detailed in the above note.    JOSHUA Rucker.  "

## 2020-02-07 ENCOUNTER — OFFICE VISIT (OUTPATIENT)
Dept: OBGYN | Facility: CLINIC | Age: 35
End: 2020-02-07
Payer: COMMERCIAL

## 2020-02-07 DIAGNOSIS — O92.70 LACTATION PROBLEM: ICD-10-CM

## 2020-02-07 DIAGNOSIS — O92.5 LACTATION SUPPRESSION: ICD-10-CM

## 2020-02-07 PROCEDURE — 99215 OFFICE O/P EST HI 40 MIN: CPT | Performed by: NURSE PRACTITIONER

## 2020-02-07 NOTE — PROGRESS NOTES
Subjective:       Milli Santizo is a 34 y.o. female here to follow upcare and to discuss lactation. She is here today with her baby and her  Talon/Miguel A     Concerns: Milli is following up for latching difficulties nipple pain and feeling that there is not  enough milk she was concerned last time about her baby being gassy.  She was going to decrease formula feeds and increased breast-feeding      HPI:   Pertinent  history: c/section  Mother does not have a history of advanced maternal age, GDM, hypertension prior to pregnancy and multiple gestation. These are common conditions which may interfere in establishing milk supply.  Mother does have insulin resistance, PCOS and thyroid disease. These are common conditions which may interfere in establishing milk supply.  Breast changes in pregnancy: Yes  History of breast surgeries: No      FEEDING HISTORY:    Past breastfeeding history:  first baby   Hospital course: Difficulty latching, started supplement and pumping but infrequent.   Prior to last appointment: Came to group where consult facilitated, nursing 6x/day, bottle 3x and topping off with bottle most feedings usually only 0/5 -0.75oz per time. Just started mothers milk tea  Currently mom is nursing 3 times a day and pumping 2-3 times a day she is having difficulty latching the baby on with those times of the breast she is most frustrated with trying to do multiple feedings pumping and knowing she has to go back to work  Both breasts: Yes 20 minutes each side  Bottle feeds: 3x/24h      Supplement: Supplementation initiated inpatient, Expressed breast milk and Formula  Quanity: 0.5oz to 2oz  How given/devices:  Bottle    Nipple Shield Use: None    Breast Pumping:   Rarely pumping  Frequency: as desired not to replace feedings or end of feedings  Type of pump: Medela with battery pack  Flange size/type: 24mm  NO pain with pumping    ROS:  Constitutional:   no fever, chills. Feeling  well  Extremities Swelling: No extremity swelling  Gastrointestinal:  Negative for nausea, vomiting  Breasts: No soreness of breasts and Soreness of nipples  Psychiatric: No mood changes and Managing ok  Mental Health:  NOT Feeling irritable, agitated, angry, overwhelmed, apathy, exhaustion  NOT having sleep changes, appetite changes       Objective:     General: no acute distress  Neurological:  Alert and oriented x3  Breasts: Symetrical , Soft, Plugged Duct - no evidence and Mastitis  - no S/S  Nipples: intact  Psychiatric:  Normal mood and affect. Her behavior is normal. Judgment and thought content normal   Mental Health:  Sadness, crying, feeling overwhelmed, agitation, hypervigilance NOT exhibited     Assessment/Plan & Lactation Counseling:     Infant Weight History:   19; 7#10.6oz  20: 8#14.5oz  20 : 10#0.2oz    Infant intake at Breast:: L 0.8    R 1.0oz    Total 2.6oz  Pumped: Type of Pump: Personal    Quantity Pumped:   Total 15ml  Initiation of Feeding: Infant initiates  Position of Feeding:    Right: cross cradle  Left: cross cradle tried football.  Mom having difficulty with deep latch so baby always lands on the nipple, reviewed latch  Attachment Achieved: rapidly  Nipple shield: N/A       Suck Pattern at the breast: Suck burst and normal rest  Behavior Following Observed Feeding: content and sleeping  Nipple Pain from:  shallow latch  Latch: Assisted latch  Suckling/Feeding: attaches, audible swallows, baby fed effectively, baby roots, elicits MACI and rhythmic  Milk Transfer at this feedin.8oz  TOTAL  Less available this week from last week  Effective breastfeeding for supply available  Milk Supply Available: low  Low milk supply:   Likely due to: ineffective or infrequent breast stimulation or milk removal    Discussed concerns and symptoms as listed above in assessment and guidance summarized below.  Topics reviewed included:  •  Herbs and medications for increasing supply and  their potential side effects and efficacy. No evidence base exists to support their use Fenugreek is contraindicated in thyroid disease and insulin resistance. Encouraged mom NOT to use fenugreek products and educated on milk supply  •  Self Care. Support, rest, getting out of the house.  • Milk supply is dependent on how many times the baby removes milk and how well the breasts are emptied in a 24 hour period. This is a biological reality that we can't work around. If, for any reason, your baby is not latching, or you are not able to nurse, then it is important for you to remove the milk instead by pumping or hand expression.  There's no magic trick, tea, cookie or supplement that will maintain your milk supply. One  must  effectively remove milk to continue to make and maximize milk. In the early days and weeks that can be 8+ times in 24 hours. For older babies, on average 6-7 + times in 24 hours.    •  Feeding: Managing feeding well based on growth.  Can stop topping off every feeding, can wear baby after a feeding walk her, take her outside.  •  Supplement: Routine mom has established with bottle and breast is appropriate for growth. Long discussion about what mom wants to do and breastfeeding is her priority If she needs more latch help she can return here frequently so that she can enjoy breastfeeding  •  Positioning Techniques for bare breast  o Suggested positions Football or cross cradle  o Fine tune position by making sure your fingers beneath the breast as well as your bra, are out of the way of your baby's chin.  o Positioning:  Many positions shown, great sidelying at 7 minutes.   o See http://globalhealthmedia.org/portfolio-items/positions-for-breastfeeding/?brqpilvhmCX=38081  o  Latch on Techniques for bare breast  o Fine tune latch:  - By holding your baby more securely at the breast, assisting your baby to stay attached by:  - Bringing your baby to your breast, not breast to the baby  - Your baby's  "cheek to touch breast securely, nose tipped back  - Hold your baby firmly in place so when your baby forgets to suck and picks it back up again your baby is in the correct spot. You will be extinguishing behavior and replacing it with a deeper latch to stimulate suck and provide satisfaction at the breast  - Your baby needs as much breast as deep in the mouth as possible to allow your nipples to heal and for you more importantly to maximize efficiency at the breast  - Latch is asymmetrical, leading with the chin, getting more underneath.  •  Pumping guidelines:  o Both breasts using \"Hands-on Pumping\" for 10-15 minutes to stimulate milk production. See video at : http://newborns.[place].edu/Breastfeeding/MaxProduction.html.  o Pump 3 times in 24 hours or whenever giving a FULL formula feeding or if you dislike pumping, stop pumping as regularly and supply will fall but you will have more time with your baby.   o Type of pump:  - Personal  - Always double pump Single pumping not as effecient  o How long will vary woman to woman, typically 8-15 minutes, or 1-2 minutes after flow slows  o Flange Fit: Freely moving nipple in the tunnel with some movement of the areola.  - Today's evaluation indicates appropriate flange with 27, changed to 24 and as appropriate, lot of slack with 27mm shields  •  Increasing supply besides Galactogogues and Pumping: Warmth, Relaxation , Physical, auditory narratives, Childbirth relaxation Techniques, Acupuncture and acupressure, Shoulder Massages and Take a nap  •  Connect with other mothers:  o Facebook:   - [place] [facility]: [email or web address]  - Well-Nourished Babies (Private group for questions and support): [email or web address]  o Breastfeeding [place] for support not assessment: Tuesday, Thursday and Saturday at 11am  •    Nipple care:  o May apply breastmilk  o Moist-oily ointment after feeding/pumping, ie Lanolin nipple " butter, coconut or olive oil, if desired/needed 2-3 times/day until nipples are healed  You do not need to wash this off before pumping or feeding the baby   Mom noted a bleb on the left nipple - discussed it's etiology being a plug and removing milk along with specific, heat, cold, vibration gentle massage to move the plug. No plug felt, keep nipple moist so that bleb of dry skin can be gently removed in shower.      Parents  expressed understanding, and asked appropriate questions    Follow-up for infant weight check and dyad breastfeeding evaluation in 1 week(s)    A total of 60  minutes, this does not include infant assessment time, were spent face to face with more than 50% spent counseling and coordinating care as detailed in the above note.    JOSHUA Rucker.

## 2020-03-04 ENCOUNTER — OFFICE VISIT (OUTPATIENT)
Dept: OBGYN | Facility: CLINIC | Age: 35
End: 2020-03-04
Payer: COMMERCIAL

## 2020-03-04 DIAGNOSIS — O92.5 LACTATION SUPPRESSION: ICD-10-CM

## 2020-03-04 PROCEDURE — 99215 OFFICE O/P EST HI 40 MIN: CPT | Performed by: NURSE PRACTITIONER

## 2020-03-04 NOTE — PROGRESS NOTES
Subjective:     Milli Santizo is a 34 y.o. female here to discuss lactation care for a new problem. She is here today with baby.    HPI  Pertinent  history: c/section  Mother does not have a history of advanced maternal age, GDM, hypertension prior to pregnancy and multiple gestation. These are common conditions which may interfere in establishing milk supply.  Mother does have insulin resistance, PCOS and thyroid disease. These are common conditions which may interfere in establishing milk supply.  Breast changes in pregnancy: Yes  History of breast surgeries: No       FEEDING HISTORY:    Past breastfeeding history:  first baby   Hospital course: Difficulty latching, started supplement and pumping but infrequent.   Prior to second last appointment: Came to group where consult facilitated, nursing 6x/day, bottle 3x and topping off with bottle most feedings usually only 0/5 -0.75oz per time. Just started mothers milk tea  Prior to last appointment mom is nursing 3 times a day and pumping 2-3 times a day she is having difficulty latching the baby on with those times of the breast she is most frustrated with trying to do multiple feedings pumping and knowing she has to go back to work  Currently mom is pumping 2 times per day does offer the breast 0-2 times per day able to pump out almost 8 ounces in 24 hours.  The rest is formula about 4 to 6 ounces 5 times a day mom has a beautifully establish routine with her baby    Bottle feeds: 5-6x/24h     Supplement:   Quanity:4-6oz, mostly 4oz  How given/devices:  Bottle     Nipple Shield Use: None     Breast Pumping:   Pumping twice per day  Type of pump: Medela with battery pack, also has the free mise with either the liberty or independence  Flange size/type: 24mm  Is experiencing pain with pumping     ROS:  Constitutional:   no fever, chills. Feeling well  Gastrointestinal:  Negative for nausea, vomiting  Breasts: No soreness of breasts and Soreness of nipples  only while pumping never while nursing  Psychiatric: No mood changes and Managing ok  Mental Health:  NOT Feeling irritable, agitated, angry, overwhelmed, apathy, exhaustion  NOT having sleep changes, appetite changes     Objective:     General: no acute distress  Neurological:  Alert and oriented x3  Breasts: Symetrical , Soft, Plugged Duct - no evidence and Mastitis  - no S/S  Nipples: intact  Psychiatric:  Normal mood and affect. Her behavior is normal. Judgment and thought content normal   Mental Health:  Sadness, crying, feeling overwhelmed, agitation, hypervigilance NOT exhibited     Assessment/Plan & Lactation Counseling:     Infant Weight History:   12/29/19; 7#10.6oz  1/24/20: 8#14.5oz  2/7/20 : 10#0.2oz  3/4/20 12#6.4oz    Infant intake at Breast:: L 0.4oz     R 0.8oz    Total 1.2oz  Milk Transfer at this feeding:   Effective breastfeeding  Pumped: Type of Pump: Personal    Initiation of Feeding: Infant initiates  Position of Feeding:    Right: cross cradle  Left: cross cradle  Attachment Achieved: rapidly  Nipple shield: N/A       Suck Pattern at the breast: Suck burst and normal rest  Behavior Following Observed Feeding: content  Nipple Pain from: Pumping, recommend to turn down the suction.  It is it is currently on the fourth dot of the Medela pump and style    Latch: Mom latches independently  Suckling/Feeding: attaches, baby fed effectively, baby roots, elicits MACI and rhythmic  Milk Supply Available: low  Low milk supply:   Likely due to: ineffective or infrequent breast stimulation or milk removal    Diagnosis/Problem  Lactation suppression      Discussed concerns and symptoms as listed above in assessment and guidance summarized below.  Topics reviewed included:  Mom was mostly interested in using medication to increase her milk supply.  We discussed that for the medication to be effective she required increased stimulation.  She is working on a routine for when she returns to work and will only  be able to pump the times that she is currently doing at home per day.    She would prefer to pump over breast-feeding so she is aware of the quantity of milk that she has.    Recommending to continue her 2 pumps per day as that works best for her routine  Continue on her oatmeal as she finds that that increases her milk supply, seen a decrease on the days that she does not needed.  When mom returns to work she may pump at 2 different times in order to still maintain the milk supply that she is getting.  Quantity of milk needed as baby increases age.  Discussed that when baby reaches 3 to 4 months they are at maximum amount of milk which is somewhere between 24 and 32 ounces per day, she may end up around 30 ounces per day but when she reaches that she no longer increases 2 ounces a week or a month.   Mom understands that concept.   Discussed return of menstruation with only stimulating the breast 2 times per day.  Mom verbalizes that she has had some spotting after having completely stopped by 6 weeks postpartum.    Discussed that she will probably ovulate and she is at the same risk of getting pregnant if she is not using any birth control  Mom expressed understanding, and asked appropriate questions  Medication use for increasing supply is not recommended      Follow-up for infant weight check and dyad breastfeeding evaluation as needed  Please call [phone] if you have not scheduled your next appointment    A total of 48 minutes, this does not include infant assessment time, were spent face to face with more than 50% spent counseling and coordinating care as detailed in the above note.      JOSHUA Rucker.

## 2020-08-12 ENCOUNTER — OFFICE VISIT (OUTPATIENT)
Dept: MEDICAL GROUP | Facility: IMAGING CENTER | Age: 35
End: 2020-08-12
Payer: COMMERCIAL

## 2020-08-12 ENCOUNTER — HOSPITAL ENCOUNTER (OUTPATIENT)
Dept: LAB | Facility: MEDICAL CENTER | Age: 35
End: 2020-08-12
Attending: FAMILY MEDICINE
Payer: COMMERCIAL

## 2020-08-12 VITALS
WEIGHT: 169 LBS | HEART RATE: 88 BPM | HEIGHT: 60 IN | TEMPERATURE: 97.4 F | SYSTOLIC BLOOD PRESSURE: 100 MMHG | BODY MASS INDEX: 33.18 KG/M2 | DIASTOLIC BLOOD PRESSURE: 60 MMHG | RESPIRATION RATE: 12 BRPM | OXYGEN SATURATION: 100 %

## 2020-08-12 DIAGNOSIS — E66.9 OBESITY (BMI 30-39.9): ICD-10-CM

## 2020-08-12 DIAGNOSIS — Z13.220 SCREENING, LIPID: ICD-10-CM

## 2020-08-12 DIAGNOSIS — E55.9 VITAMIN D INSUFFICIENCY: ICD-10-CM

## 2020-08-12 DIAGNOSIS — E28.2 PCOS (POLYCYSTIC OVARIAN SYNDROME): ICD-10-CM

## 2020-08-12 DIAGNOSIS — R53.83 FATIGUE, UNSPECIFIED TYPE: ICD-10-CM

## 2020-08-12 DIAGNOSIS — D64.9 NORMOCYTIC ANEMIA: ICD-10-CM

## 2020-08-12 DIAGNOSIS — F41.9 ANXIETY: ICD-10-CM

## 2020-08-12 DIAGNOSIS — R45.89 DEPRESSED MOOD: ICD-10-CM

## 2020-08-12 DIAGNOSIS — E03.9 HYPOTHYROIDISM (ACQUIRED): ICD-10-CM

## 2020-08-12 LAB
25(OH)D3 SERPL-MCNC: 23 NG/ML (ref 30–100)
ALBUMIN SERPL BCP-MCNC: 4.2 G/DL (ref 3.2–4.9)
ALBUMIN/GLOB SERPL: 1.4 G/DL
ALP SERPL-CCNC: 78 U/L (ref 30–99)
ALT SERPL-CCNC: 37 U/L (ref 2–50)
ANION GAP SERPL CALC-SCNC: 15 MMOL/L (ref 7–16)
AST SERPL-CCNC: 27 U/L (ref 12–45)
BASOPHILS # BLD AUTO: 0.4 % (ref 0–1.8)
BASOPHILS # BLD: 0.03 K/UL (ref 0–0.12)
BILIRUB SERPL-MCNC: 0.3 MG/DL (ref 0.1–1.5)
BUN SERPL-MCNC: 10 MG/DL (ref 8–22)
CALCIUM SERPL-MCNC: 9.3 MG/DL (ref 8.5–10.5)
CHLORIDE SERPL-SCNC: 105 MMOL/L (ref 96–112)
CHOLEST SERPL-MCNC: 153 MG/DL (ref 100–199)
CO2 SERPL-SCNC: 18 MMOL/L (ref 20–33)
CREAT SERPL-MCNC: 0.53 MG/DL (ref 0.5–1.4)
EOSINOPHIL # BLD AUTO: 0.13 K/UL (ref 0–0.51)
EOSINOPHIL NFR BLD: 1.6 % (ref 0–6.9)
ERYTHROCYTE [DISTWIDTH] IN BLOOD BY AUTOMATED COUNT: 41.1 FL (ref 35.9–50)
FASTING STATUS PATIENT QL REPORTED: NORMAL
GLOBULIN SER CALC-MCNC: 3.1 G/DL (ref 1.9–3.5)
GLUCOSE SERPL-MCNC: 85 MG/DL (ref 65–99)
HCT VFR BLD AUTO: 48.1 % (ref 37–47)
HDLC SERPL-MCNC: 34 MG/DL
HGB BLD-MCNC: 14.8 G/DL (ref 12–16)
IMM GRANULOCYTES # BLD AUTO: 0.02 K/UL (ref 0–0.11)
IMM GRANULOCYTES NFR BLD AUTO: 0.2 % (ref 0–0.9)
LDLC SERPL CALC-MCNC: 86 MG/DL
LYMPHOCYTES # BLD AUTO: 2.32 K/UL (ref 1–4.8)
LYMPHOCYTES NFR BLD: 28.5 % (ref 22–41)
MCH RBC QN AUTO: 26.9 PG (ref 27–33)
MCHC RBC AUTO-ENTMCNC: 30.8 G/DL (ref 33.6–35)
MCV RBC AUTO: 87.3 FL (ref 81.4–97.8)
MONOCYTES # BLD AUTO: 0.61 K/UL (ref 0–0.85)
MONOCYTES NFR BLD AUTO: 7.5 % (ref 0–13.4)
NEUTROPHILS # BLD AUTO: 5.03 K/UL (ref 2–7.15)
NEUTROPHILS NFR BLD: 61.8 % (ref 44–72)
NRBC # BLD AUTO: 0 K/UL
NRBC BLD-RTO: 0 /100 WBC
PLATELET # BLD AUTO: 225 K/UL (ref 164–446)
PMV BLD AUTO: 14 FL (ref 9–12.9)
POTASSIUM SERPL-SCNC: 4.4 MMOL/L (ref 3.6–5.5)
PROT SERPL-MCNC: 7.3 G/DL (ref 6–8.2)
RBC # BLD AUTO: 5.51 M/UL (ref 4.2–5.4)
SODIUM SERPL-SCNC: 138 MMOL/L (ref 135–145)
T3 SERPL-MCNC: 227 NG/DL (ref 60–181)
T4 FREE SERPL-MCNC: 2.82 NG/DL (ref 0.93–1.7)
THYROPEROXIDASE AB SERPL-ACNC: 93 IU/ML (ref 0–9)
TRIGL SERPL-MCNC: 164 MG/DL (ref 0–149)
TSH SERPL DL<=0.005 MIU/L-ACNC: 0.01 UIU/ML (ref 0.38–5.33)
WBC # BLD AUTO: 8.1 K/UL (ref 4.8–10.8)

## 2020-08-12 PROCEDURE — 36415 COLL VENOUS BLD VENIPUNCTURE: CPT

## 2020-08-12 PROCEDURE — 80053 COMPREHEN METABOLIC PANEL: CPT

## 2020-08-12 PROCEDURE — 99204 OFFICE O/P NEW MOD 45 MIN: CPT | Performed by: FAMILY MEDICINE

## 2020-08-12 PROCEDURE — 80061 LIPID PANEL: CPT

## 2020-08-12 PROCEDURE — 86376 MICROSOMAL ANTIBODY EACH: CPT

## 2020-08-12 PROCEDURE — 84480 ASSAY TRIIODOTHYRONINE (T3): CPT

## 2020-08-12 PROCEDURE — 84443 ASSAY THYROID STIM HORMONE: CPT

## 2020-08-12 PROCEDURE — 85025 COMPLETE CBC W/AUTO DIFF WBC: CPT

## 2020-08-12 PROCEDURE — 82306 VITAMIN D 25 HYDROXY: CPT

## 2020-08-12 PROCEDURE — 84439 ASSAY OF FREE THYROXINE: CPT

## 2020-08-12 PROCEDURE — 86800 THYROGLOBULIN ANTIBODY: CPT

## 2020-08-12 SDOH — HEALTH STABILITY: MENTAL HEALTH
STRESS IS WHEN SOMEONE FEELS TENSE, NERVOUS, ANXIOUS, OR CAN'T SLEEP AT NIGHT BECAUSE THEIR MIND IS TROUBLED. HOW STRESSED ARE YOU?: TO SOME EXTENT

## 2020-08-12 SDOH — HEALTH STABILITY: MENTAL HEALTH: HOW OFTEN DO YOU HAVE A DRINK CONTAINING ALCOHOL?: MONTHLY OR LESS

## 2020-08-12 SDOH — HEALTH STABILITY: PHYSICAL HEALTH: ON AVERAGE, HOW MANY DAYS PER WEEK DO YOU ENGAGE IN MODERATE TO STRENUOUS EXERCISE (LIKE A BRISK WALK)?: 0 DAYS

## 2020-08-12 ASSESSMENT — PATIENT HEALTH QUESTIONNAIRE - PHQ9
5. POOR APPETITE OR OVEREATING: 2 - MORE THAN HALF THE DAYS
SUM OF ALL RESPONSES TO PHQ QUESTIONS 1-9: 13
CLINICAL INTERPRETATION OF PHQ2 SCORE: 3

## 2020-08-12 ASSESSMENT — FIBROSIS 4 INDEX: FIB4 SCORE: 1.38

## 2020-08-12 ASSESSMENT — PAIN SCALES - GENERAL: PAINLEVEL: NO PAIN

## 2020-08-12 NOTE — PROGRESS NOTES
Chief Complaint   Patient presents with   • Fatigue   • Depression       HPI:  35 y.o. female new patient with history of anxiety, PCOS and thyroid disorder here to review medical issues and establish care.   Previously with Dr. IRA Pan.   Patient notes feeling more fatigued and having some depressive symptoms.  Has limited energy to go walk outside, may use a carrier to take her daughter.   She does have a history of anxiety for which she saw behavioral therapy in the past.  Was also on Valium as needed previously.  Does not necessarily feel she needs medication at this time but also has a thyroid disorder for which she like blood work.  She has been on levothyroxine 25 mcg daily.  Notes that she had her daughter about 8 months ago, but has not had a follow-up with a physician besides her OB since then.  She does cosleep with her daughter at nighttime.  She has concerns of her daughter sleeping in bed. Daughter does get up about once or twice at night and might feed at night.  She is not currently breast-feeding.  Patient may also nap about 1 to 2 hours a day which may affect her getting to sleep in the evening.  States she has not been eating well, does not regularly eat breakfast and eats lunch late around 4 PM.  She currently works from home.  Also lives with her  who helps her out at home.  She is planning to try to conceive again next year.  Been feeling a bit more anxious due to current concerns for COVID-19 in the community.   She is interested in starting counseling therapy again.  States she saw Dr. Medeiros in the past.  No SI/HI.    She also has a history of PCOS.  Was on metformin prior to conceiving, per Dr. Armstrong, which was the fertility specialist.  Wondering if the metformin should be restarted.  Periods tend to be slightly irregular, has started her periods again.  Notes she breast-fed for about 5 months.  Currently not breast-feeding.  Currently uses condoms for intercourse, no other  birth control.  She also would like to lose weight but has had some difficulty.  Patient with history of anemia and vitamin D deficiency.  Body mass index is 33.01 kg/m².    Per patient history, positive PPD in the past.  Patient is uncertain about receiving BCG vaccine or prior therapy. Will need prior record review.   Does not usually have a regular cough, shortness of breath or chest pains.  Only might have a slight dry cough for a day or 2 if she eats something cold.      Health Maintenance:   Last pap:  Dr. Montgomery, had one last year- normal. Need records.   Immunizations: Td/Tdap 2019;  Influenza vaccine 2019    Lifestyle:  Diet: eats late in day-salads, hot pockets, rice- benjamin.   Exercise/Activities: none  Stressors: COVID-19 related issues  Social Support: lives with . Parents live in town.   Work: billing, physician coding, etc. Part time.       Review of Systems   Constitutional: Negative for fever, chills and malaise..   HENT: Negative for congestion, sore throat, or swallowing issues.   Eyes: Negative for pain or vision changes.   Respiratory: Negative for productive cough and shortness of breath.    Cardiovascular: Negative for leg swelling. No chest pain.   Gastrointestinal: Negative for nausea, vomiting, abdominal pain and diarrhea.   Genitourinary: Negative for dysuria and hematuria.   Skin: Negative for rash.   Neurological: Negative for dizziness, focal weakness and headaches.   Endo/Heme/Allergies: Does not bruise/bleed easily.   Psychiatric/Behavioral:as above.       Current Outpatient Medications:   •  levothyroxine (SYNTHROID) 25 MCG Tab, Take 1 Tab by mouth Every morning on an empty stomach., Disp: 90 Tab, Rfl: 3  •  Prenatal Multivit-Min-Fe-FA (PRENATAL VITAMINS PO), Take  by mouth., Disp: , Rfl:     No Known Allergies    Patient Active Problem List   Diagnosis   • Positive PPD   • Family planning   • Psychologic vaginismus   • Hypothyroidism (acquired)   • Acne vulgaris   • Meralgia  paresthetica of right side   • Generalized anxiety disorder   • PCOS (polycystic ovarian syndrome)   • Chronic fatigue   • Slow transit constipation   • Normocytic anemia   • Vitamin D insufficiency   • Pregnancy   • Lactation problem   • Lactation suppression       Past Medical History:   Diagnosis Date   • Anxiety    • PCOS (polycystic ovarian syndrome)    • Positive PPD    • Thyroid disease        Past Surgical History:   Procedure Laterality Date   • PB  DELIVERY ONLY N/A 2019    Procedure:  SECTION, PRIMARY;  Surgeon: Nadira Abrams M.D.;  Location: LABOR AND DELIVERY;  Service: Labor and Delivery       Family History   Problem Relation Age of Onset   • Arthritis Mother    • Thyroid Mother         hypothyroid   • Hypertension Father    • Arthritis Maternal Grandmother    • Hypertension Paternal Grandmother        Social History     Socioeconomic History   • Marital status:      Spouse name: Not on file   • Number of children: 0   • Years of education: Not on file   • Highest education level: Not on file   Occupational History   • Occupation: Health Information Management     Employer: [employer]   Social Needs   • Financial resource strain: Not on file   • Food insecurity     Worry: Not on file     Inability: Not on file   • Transportation needs     Medical: Not on file     Non-medical: Not on file   Tobacco Use   • Smoking status: Never Smoker   • Smokeless tobacco: Never Used   Substance and Sexual Activity   • Alcohol use: Yes     Alcohol/week: 0.6 oz     Types: 1 Glasses of wine per week     Frequency: Monthly or less     Comment: infrequent, drinks 2-3 times a month   • Drug use: No   • Sexual activity: Yes     Partners: Male   Lifestyle   • Physical activity     Days per week: 0 days     Minutes per session: Not on file   • Stress: To some extent       /60   Pulse 88   Temp 36.3 °C (97.4 °F)   Resp 12   Ht 1.524 m (5')   Wt 76.7 kg (169 lb)   LMP 2020 (Exact  Date)   SpO2 100%   Breastfeeding No   BMI 33.01 kg/m²     PHYSICAL EXAM:  Constitutional: She appears well-developed and well-nourished. She appears not diaphoretic. No distress.   HENT: Right Ear: External ear normal. Left Ear: External ear normal. Tympanic membranes clear and intact.   Nose: Nose normal.   Mouth/Throat: Oropharynx is clear and moist. No oropharyngeal exudate.     Eyes: Conjunctivae and extraocular motions are normal. Pupils are equal, round, and reactive to light. No scleral icterus.   Neck: Normal range of motion. Neck supple. No thyromegaly present.   Cardiovascular: Normal rate, regular rhythm, normal heart sounds and intact distal pulses.  Exam reveals no gallop and no friction rub.  No murmur heard. No carotid bruits.   Pulmonary/Chest: Effort normal and breath sounds normal. No respiratory distress. She has no wheezes. She has no rales.   Abdominal: Soft. Bowel sounds are normal. She exhibits no distension and no mass. No tenderness. She has no rebound and no guarding.   Lymphadenopathy:  She has no cervical adenopathy.   Neurological: She is alert. She has normal reflexes. No cranial nerve deficit. She exhibits normal muscle tone.   Skin: Skin is warm and dry. No rash noted. She is not diaphoretic. No erythema.   Psychiatric: She has a normal mood and affect. Her behavior is normal.   Musculoskeletal: She exhibits no edema. Full strength throughout. 2+ DTR throughout.         ASSESSMENT/PLAN:    This is a 35 y.o. female new patient with history of thyroid disorder and anxiety.    1. Fatigue, unspecified type     2. Depressed mood  REFERRAL TO BEHAVIORAL HEALTH   3. Anxiety  REFERRAL TO BEHAVIORAL HEALTH   4. Hypothyroidism (acquired)  TSH    TRIIDOTHYRONINE    FREE THYROXINE    THYROID PEROXIDASE  (TPO) AB    ANTITHYROGLOBULIN AB   5. PCOS (polycystic ovarian syndrome)  Comp Metabolic Panel   6. Vitamin D insufficiency  VITAMIN D,25 HYDROXY   7. Normocytic anemia  CBC WITH DIFFERENTIAL    8. Screening, lipid  Lipid Profile   9. Obesity (BMI 30-39.9)     -Fatigue, depressed mood and anxiety are likely multifactorial related to current stressors, lack of sleep and caring for child, and possibly thyroid issues.  Will obtain lab work to evaluate further.  Will refer for counseling therapy.  Patient does not feel she needs medication at this time although this is an option for future.    Counseled on management of anxiety and depressive symptoms and current stressors related to COVID-19. Recommend working on self care with adequate nutrition and fluid intake, routine exercise, adequate sleep and stress management.  Discussed meditation exercises as well.  She may consider magnesium therapy.  Discussed some modifications of sleep habits-sleep training her child and having child sleep in her own bed.  Resources discussed.  If planning to conceive in future would recommend prenatal vitamin.  She will follow-up in 2 weeks after completing lab work.  -Hypothyroidism-as above.  Monitor lab work.  Continue current medication dose.  -PCOS-we will plan to obtain lab work.  Discussed possibly re-starting metformin near future.  -Vitamin D insufficiency-recheck lab work.  -Normocytic anemia-appears labs stable on prior 12/2018 labs.  Recheck lab work.  -Obesity-we will assess thyroid labs.  Otherwise work on healthy diet and routine exercise.  -Will need further records/information regarding hx of +PPD in past.       Return in about 2 weeks (around 8/26/2020).    This medical record contains text that has been entered with the assistance of computer voice recognition and dictation software.  Therefore, it may contain unintended errors in text, spelling, punctuation, or grammar.

## 2020-08-14 LAB — THYROGLOB AB SERPL-ACNC: <0.9 IU/ML (ref 0–4)

## 2020-08-20 ENCOUNTER — TELEPHONE (OUTPATIENT)
Dept: MEDICAL GROUP | Facility: IMAGING CENTER | Age: 35
End: 2020-08-20

## 2020-08-20 NOTE — TELEPHONE ENCOUNTER
----- Message from Kylie Lamb M.D. sent at 8/19/2020  5:19 PM PDT -----  pls assist pt to schedule fv.

## 2020-09-02 ENCOUNTER — TELEMEDICINE (OUTPATIENT)
Dept: MEDICAL GROUP | Facility: IMAGING CENTER | Age: 35
End: 2020-09-02
Payer: COMMERCIAL

## 2020-09-02 VITALS — WEIGHT: 169 LBS | BODY MASS INDEX: 33.18 KG/M2 | HEIGHT: 60 IN

## 2020-09-02 DIAGNOSIS — E78.1 HYPERTRIGLYCERIDEMIA: ICD-10-CM

## 2020-09-02 DIAGNOSIS — R76.8 ANTI-TPO ANTIBODIES PRESENT: ICD-10-CM

## 2020-09-02 DIAGNOSIS — E03.9 HYPOTHYROIDISM (ACQUIRED): ICD-10-CM

## 2020-09-02 DIAGNOSIS — E28.2 PCOS (POLYCYSTIC OVARIAN SYNDROME): ICD-10-CM

## 2020-09-02 DIAGNOSIS — R22.1 NECK FULLNESS: ICD-10-CM

## 2020-09-02 DIAGNOSIS — R71.8 ELEVATED HEMATOCRIT: ICD-10-CM

## 2020-09-02 DIAGNOSIS — E55.9 VITAMIN D INSUFFICIENCY: ICD-10-CM

## 2020-09-02 DIAGNOSIS — R79.89 DECREASED THYROID STIMULATING HORMONE (TSH) LEVEL: ICD-10-CM

## 2020-09-02 DIAGNOSIS — R45.89 DEPRESSED MOOD: ICD-10-CM

## 2020-09-02 DIAGNOSIS — R53.83 FATIGUE, UNSPECIFIED TYPE: ICD-10-CM

## 2020-09-02 DIAGNOSIS — F41.9 ANXIETY: ICD-10-CM

## 2020-09-02 DIAGNOSIS — R06.83 SNORING: ICD-10-CM

## 2020-09-02 DIAGNOSIS — E78.6 LOW HDL (UNDER 40): ICD-10-CM

## 2020-09-02 PROCEDURE — 99214 OFFICE O/P EST MOD 30 MIN: CPT | Mod: 95,CR | Performed by: FAMILY MEDICINE

## 2020-09-02 RX ORDER — CHOLECALCIFEROL (VITAMIN D3) 50 MCG
TABLET ORAL DAILY
COMMUNITY

## 2020-09-02 ASSESSMENT — PAIN SCALES - GENERAL: PAINLEVEL: NO PAIN

## 2020-09-02 ASSESSMENT — FIBROSIS 4 INDEX: FIB4 SCORE: 0.65

## 2020-09-02 NOTE — PROGRESS NOTES
Telemedicine Visit: Established Patient     This encounter was conducted via Zoom .   Verbal consent was obtained. Patient's identity was verified.    Subjective:     Chief Complaint   Patient presents with   • Hypothyroidism     stopped medication, no symptoms    • Lab Results     labs        35 y.o. female with history of hypothyroidism, anxiety, and PCOS for follow-up of lab work and symptoms of fatigue.     Fatigue-states that she has not been able to sleep well in the evening.  She does have an 8-month-old daughter going through some changes thus further affecting her sleep. Has some family and friend support, but the covid situation has made it challenging.  Has had some depressed mood and anxiety which have been otherwise stable since her prior visit.  She has been referred to behavioral health. She also started some classes in college recently. She is currently managing okay.  Peers some prior history of fatigue as well. No SI/HI.     History of hypothyroidism-recent TSH low on labs.  She did stop her levothyroxine 25 mcg recently.  Denies any symptoms of palpitations, chest pains or shortness of breath.  No unusual changes in weight.  TPO elevated.  Last thyroid ultrasound was in 2017.  Not sure if she feels her thyroid region is cheney enlarged, but has had some comments from family or friends.    Vitamin D insufficiency-she has started on vitamin D 2000 IU daily.    PCOS- prior to pregnancy she was on metformin 1000 mg twice daily.  She has tolerated medication well.  She does have a glucometer available at home to check her glucose levels.  She has not had any issues on the medications previously.  It was prescribed by the reproductive specialist.  States she would like to restart her medication at this time.    Hematocrit increased-does note some snoring at nighttime.  Was recommended to have a prior sleep study which she did not complete.  States she does stay well-hydrated.  Has drink 1 glass of  water today.    Low HDL and elevated triglycerides on labs.       ROS   Denies any recent fevers or chills. No nausea or vomiting. No chest pains or shortness of breath.     No Known Allergies    Current medicines (including changes today)  Current Outpatient Medications   Medication Sig Dispense Refill   • Cholecalciferol (VITAMIN D) 2000 UNIT Tab Take  by mouth every day.     • metFORMIN (GLUCOPHAGE) 500 MG Tab Take 1 Tab by mouth 2 times a day, with meals. 60 Tab 3   • Prenatal Multivit-Min-Fe-FA (PRENATAL VITAMINS PO) Take  by mouth.       No current facility-administered medications for this visit.        Patient Active Problem List    Diagnosis Date Noted   • Lactation problem 2020   • Lactation suppression 2020   • Pregnancy 10/24/2019   • Slow transit constipation 2019   • Normocytic anemia 2019   • Vitamin D insufficiency 2019   • Chronic fatigue 2019   • PCOS (polycystic ovarian syndrome) 2018   • Meralgia paresthetica of right side 2017   • Generalized anxiety disorder 2017   • Hypothyroidism (acquired) 2017   • Acne vulgaris 2017   • Psychologic vaginismus 2017   • Family planning 2013   • Positive PPD        Family History   Problem Relation Age of Onset   • Arthritis Mother    • Thyroid Mother         hypothyroid   • Hypertension Father    • Arthritis Maternal Grandmother    • Hypertension Paternal Grandmother        Patient has a past medical history of Anxiety, PCOS (polycystic ovarian syndrome), Positive PPD, and Thyroid disease. She also has no past medical history of Alcohol abuse, Alcoholism (HCC), Depression, Psychosis (HCC), or Substance abuse (HCC).  Patient  has a past surgical history that includes pr  delivery only (N/A, 2019).       Objective:   Vitals obtained by patient:  Ht 1.524 m (5')   Wt 76.7 kg (169 lb)   LMP 2020 (Exact Date)   BMI 33.01 kg/m²     Physical Exam:  Constitutional:  Alert, no distress, well-groomed.  Skin: No rashes in visible areas.  Eye: Round. Conjunctiva clear, lids normal. No icterus.   ENMT: Lips pink without lesions, good dentition, moist mucous membranes. Phonation normal.  Neck: Anterior neck region appears to have slight fullness noted.  Moves freely without pain.  CV: Pulse as reported by patient  Respiratory: Unlabored respiratory effort, no cough or audible wheeze  Psych: Alert and oriented x3, normal affect and mood.        Ref. Range 8/12/2020 10:19   WBC Latest Ref Range: 4.8 - 10.8 K/uL 8.1   RBC Latest Ref Range: 4.20 - 5.40 M/uL 5.51 (H)   Hemoglobin Latest Ref Range: 12.0 - 16.0 g/dL 14.8   Hematocrit Latest Ref Range: 37.0 - 47.0 % 48.1 (H)   MCV Latest Ref Range: 81.4 - 97.8 fL 87.3   MCH Latest Ref Range: 27.0 - 33.0 pg 26.9 (L)   MCHC Latest Ref Range: 33.6 - 35.0 g/dL 30.8 (L)   RDW Latest Ref Range: 35.9 - 50.0 fL 41.1   Platelet Count Latest Ref Range: 164 - 446 K/uL 225   MPV Latest Ref Range: 9.0 - 12.9 fL 14.0 (H)   Neutrophils-Polys Latest Ref Range: 44.00 - 72.00 % 61.80   Neutrophils (Absolute) Latest Ref Range: 2.00 - 7.15 K/uL 5.03   Lymphocytes Latest Ref Range: 22.00 - 41.00 % 28.50   Lymphs (Absolute) Latest Ref Range: 1.00 - 4.80 K/uL 2.32   Monocytes Latest Ref Range: 0.00 - 13.40 % 7.50   Monos (Absolute) Latest Ref Range: 0.00 - 0.85 K/uL 0.61   Eosinophils Latest Ref Range: 0.00 - 6.90 % 1.60   Eos (Absolute) Latest Ref Range: 0.00 - 0.51 K/uL 0.13   Basophils Latest Ref Range: 0.00 - 1.80 % 0.40   Baso (Absolute) Latest Ref Range: 0.00 - 0.12 K/uL 0.03   Immature Granulocytes Latest Ref Range: 0.00 - 0.90 % 0.20   Immature Granulocytes (abs) Latest Ref Range: 0.00 - 0.11 K/uL 0.02   Nucleated RBC Latest Units: /100 WBC 0.00   NRBC (Absolute) Latest Units: K/uL 0.00   Sodium Latest Ref Range: 135 - 145 mmol/L 138   Potassium Latest Ref Range: 3.6 - 5.5 mmol/L 4.4   Chloride Latest Ref Range: 96 - 112 mmol/L 105   Co2 Latest Ref Range:  20 - 33 mmol/L 18 (L)   Anion Gap Latest Ref Range: 7.0 - 16.0  15.0   Glucose Latest Ref Range: 65 - 99 mg/dL 85   Bun Latest Ref Range: 8 - 22 mg/dL 10   Creatinine Latest Ref Range: 0.50 - 1.40 mg/dL 0.53   GFR If  Latest Ref Range: >60 mL/min/1.73 m 2 >60   GFR If Non  Latest Ref Range: >60 mL/min/1.73 m 2 >60   Calcium Latest Ref Range: 8.5 - 10.5 mg/dL 9.3   AST(SGOT) Latest Ref Range: 12 - 45 U/L 27   ALT(SGPT) Latest Ref Range: 2 - 50 U/L 37   Alkaline Phosphatase Latest Ref Range: 30 - 99 U/L 78   Total Bilirubin Latest Ref Range: 0.1 - 1.5 mg/dL 0.3   Albumin Latest Ref Range: 3.2 - 4.9 g/dL 4.2   Total Protein Latest Ref Range: 6.0 - 8.2 g/dL 7.3   Globulin Latest Ref Range: 1.9 - 3.5 g/dL 3.1   A-G Ratio Latest Units: g/dL 1.4   Fasting Status Unknown Fasting   Cholesterol,Tot Latest Ref Range: 100 - 199 mg/dL 153   Triglycerides Latest Ref Range: 0 - 149 mg/dL 164 (H)   HDL Latest Ref Range: >=40 mg/dL 34 (A)   LDL Latest Ref Range: <100 mg/dL 86   25-Hydroxy   Vitamin D 25 Latest Ref Range: 30 - 100 ng/mL 23 (L)   TSH Latest Ref Range: 0.380 - 5.330 uIU/mL 0.015 (L)   Free T-4 Latest Ref Range: 0.93 - 1.70 ng/dL 2.82 (H)   T3 Latest Ref Range: 60.0 - 181.0 ng/dL 227.0 (H)   Microsomal -Tpo- Abs Latest Ref Range: 0.0 - 9.0 IU/mL 93.0 (H)   Anti-Thyroglobulin Latest Ref Range: 0.0 - 4.0 IU/mL <0.9       Assessment and Plan:   The following treatment plan was discussed:     35 y.o. female with history of hypothyroidism, anxiety and PCOS.    1. Fatigue, unspecified type- likely multifactorial.   Will monitor thyroid levels. Monitor sleep, obtain overnight oximetry test.   Recommend continue working on self care with adequate nutrition and fluid intake, routine exercise, adequate sleep and stress management.      2. Depressed mood- overall stable, multifactorial associated with recent stressors.  Consider counseling therapy and maintain routine support with family and  friends. Monitor thyroid labs.     3. Anxiety -overall stable, multifactorial associated with recent stressors.  Consider counseling therapy and maintain routine support with family and friends. Monitor thyroid labs.     4. Hypothyroidism (acquired) - was on levothyroxine, which she recently discontinued since lab work showed decreased TSH, elevated FT4 and T3. Otherwise patient has been stable, no new symptoms.   -Monitor labs, recheck labs in 2-4 weeks discussed. Obtain thyroid ultrasound.  US-SOFT TISSUES OF HEAD - NECK    TSH    FREE THYROXINE    TRIIDOTHYRONINE   5. Anti-TPO antibodies present - as above.     6. Decreased thyroid stimulating hormone (TSH) level - as above. Patient advised to report if she experiences any new symptoms. Monitor closely.     7. Neck fullness - as above.  US-SOFT TISSUES OF HEAD - NECK   8. Vitamin D insufficiency   -Continue on current supplement started vitamin D 3, 2000 IU daily, recheck labs in future.     9. PCOS (polycystic ovarian syndrome)-was previously on metformin 1000 mg twice daily for reproductive specialist.  Patient desires to restart medication.  -Recommend start on metformin 500 mg once daily and then increase to twice daily as tolerated.  Monitor glucose levels for any symptoms that may be related to low blood sugars. Monitor.  HEMOGLOBIN A1C  Metformin 500 mg.   10. Elevated hematocrit  -Recommend keep well-hydrated.  Evaluate with overnight oximetry.    11. Snoring   -We will evaluate with overnight oximetry due to patient's symptoms of fatigue and elevated hematocrit levels.        Follow-up: Return in about 3 weeks (around 9/23/2020).   Follow up sooner as needed.               This medical record contains text that has been entered with the assistance of computer voice recognition and dictation software.  Therefore, it may contain unintended errors in text, spelling, punctuation, or grammar.

## 2020-09-12 ENCOUNTER — HOSPITAL ENCOUNTER (OUTPATIENT)
Dept: LAB | Facility: MEDICAL CENTER | Age: 35
End: 2020-09-12
Attending: FAMILY MEDICINE
Payer: COMMERCIAL

## 2020-09-12 ENCOUNTER — HOSPITAL ENCOUNTER (OUTPATIENT)
Dept: RADIOLOGY | Facility: MEDICAL CENTER | Age: 35
End: 2020-09-12
Attending: FAMILY MEDICINE
Payer: COMMERCIAL

## 2020-09-12 DIAGNOSIS — E03.9 HYPOTHYROIDISM (ACQUIRED): ICD-10-CM

## 2020-09-12 DIAGNOSIS — E28.2 PCOS (POLYCYSTIC OVARIAN SYNDROME): ICD-10-CM

## 2020-09-12 DIAGNOSIS — R22.1 NECK FULLNESS: ICD-10-CM

## 2020-09-12 LAB
EST. AVERAGE GLUCOSE BLD GHB EST-MCNC: 123 MG/DL
HBA1C MFR BLD: 5.9 % (ref 0–5.6)
T3 SERPL-MCNC: 89.5 NG/DL (ref 60–181)
T4 FREE SERPL-MCNC: 0.83 NG/DL (ref 0.93–1.7)
TSH SERPL DL<=0.005 MIU/L-ACNC: 0.62 UIU/ML (ref 0.38–5.33)

## 2020-09-12 PROCEDURE — 83036 HEMOGLOBIN GLYCOSYLATED A1C: CPT

## 2020-09-12 PROCEDURE — 84439 ASSAY OF FREE THYROXINE: CPT

## 2020-09-12 PROCEDURE — 76536 US EXAM OF HEAD AND NECK: CPT

## 2020-09-12 PROCEDURE — 36415 COLL VENOUS BLD VENIPUNCTURE: CPT

## 2020-09-12 PROCEDURE — 84480 ASSAY TRIIODOTHYRONINE (T3): CPT

## 2020-09-12 PROCEDURE — 84443 ASSAY THYROID STIM HORMONE: CPT

## 2020-09-23 ENCOUNTER — TELEMEDICINE (OUTPATIENT)
Dept: MEDICAL GROUP | Facility: IMAGING CENTER | Age: 35
End: 2020-09-23
Payer: COMMERCIAL

## 2020-09-23 VITALS — WEIGHT: 169 LBS | BODY MASS INDEX: 33.18 KG/M2 | HEIGHT: 60 IN

## 2020-09-23 DIAGNOSIS — G47.34 NOCTURNAL OXYGEN DESATURATION: ICD-10-CM

## 2020-09-23 DIAGNOSIS — R76.8 ANTI-TPO ANTIBODIES PRESENT: ICD-10-CM

## 2020-09-23 DIAGNOSIS — G47.9 SLEEP DIFFICULTIES: ICD-10-CM

## 2020-09-23 DIAGNOSIS — Z86.39 HISTORY OF HYPOTHYROIDISM: ICD-10-CM

## 2020-09-23 DIAGNOSIS — R71.8 ELEVATED HEMATOCRIT: ICD-10-CM

## 2020-09-23 DIAGNOSIS — E07.9 THYROID DISORDER: ICD-10-CM

## 2020-09-23 DIAGNOSIS — R53.83 FATIGUE, UNSPECIFIED TYPE: ICD-10-CM

## 2020-09-23 PROCEDURE — 99214 OFFICE O/P EST MOD 30 MIN: CPT | Mod: 95,CR | Performed by: FAMILY MEDICINE

## 2020-09-23 ASSESSMENT — FIBROSIS 4 INDEX: FIB4 SCORE: 0.65

## 2020-09-23 NOTE — PROGRESS NOTES
Telemedicine Visit: Established Patient     This encounter was conducted via Zoom .   Verbal consent was obtained. Patient's identity was verified.    Subjective:     Chief Complaint   Patient presents with   • Results     overnight pulse ox and blood work        35 y.o. female with with history of hypothyroidism, anxiety and PCOS for follow-up of lab work and symptoms of fatigue.    Thyroid disorder- she been off her thyroid medication.  Prior labs showed she was hyperthyroid.  Labs appear to be normalizing.  Slight decrease in free T4 noted, otherwise normal TSH and T3.  Also had ultrasound of thyroid completed which was unremarkable.  Overall feels her fatigue is improving.  She has been working on self-care with adequate nutrition and hydration.  Continues to struggle with her sleep at night due to having to complete schoolwork and having it 8-9-month-old daughter.    Slight increase in hematocrit prior labs.  Had overnight oximetry test completed with decrease in nocturnal oxygen noted.  Patient declined further testing and nocturnal oxygen therapy at this time.      ROS   Denies any recent fevers or chills. No nausea or vomiting. No chest pains or shortness of breath.     No Known Allergies    Current medicines (including changes today)  Current Outpatient Medications   Medication Sig Dispense Refill   • Cholecalciferol (VITAMIN D) 2000 UNIT Tab Take  by mouth every day.     • metFORMIN (GLUCOPHAGE) 500 MG Tab Take 1 Tab by mouth 2 times a day, with meals. 60 Tab 3   • Prenatal Multivit-Min-Fe-FA (PRENATAL VITAMINS PO) Take  by mouth.       No current facility-administered medications for this visit.        Patient Active Problem List    Diagnosis Date Noted   • History of hypothyroidism 09/23/2020   • Lactation problem 01/24/2020   • Lactation suppression 01/24/2020   • Pregnancy 10/24/2019   • Slow transit constipation 08/08/2019   • Normocytic anemia 08/08/2019   • Vitamin D insufficiency 08/08/2019   •  Chronic fatigue 2019   • PCOS (polycystic ovarian syndrome) 2018   • Meralgia paresthetica of right side 2017   • Generalized anxiety disorder 2017   • Hypothyroidism (acquired) 2017   • Acne vulgaris 2017   • Psychologic vaginismus 2017   • Family planning 2013   • Positive PPD        Family History   Problem Relation Age of Onset   • Arthritis Mother    • Thyroid Mother         hypothyroid   • Hypertension Father    • Arthritis Maternal Grandmother    • Hypertension Paternal Grandmother        Patient has a past medical history of Anxiety, PCOS (polycystic ovarian syndrome), Positive PPD, and Thyroid disease. She also has no past medical history of Alcohol abuse, Alcoholism (HCC), Depression, Psychosis (HCC), or Substance abuse (HCC).  Patient  has a past surgical history that includes pr  delivery only (N/A, 2019).       Objective:   Vitals obtained by patient:  Ht 1.524 m (5')   Wt 76.7 kg (169 lb)   LMP 2020   BMI 33.01 kg/m²     Physical Exam:  Constitutional: Alert, no distress, well-groomed.  Skin: No rashes in visible areas.  Eye: Round. Conjunctiva clear, lids normal. No icterus.   ENMT: Lips pink without lesions, good dentition, moist mucous membranes. Phonation normal.  Neck: No masses, no thyromegaly. Moves freely without pain.  CV: Pulse as reported by patient  Respiratory: Unlabored respiratory effort, no cough or audible wheeze  Psych: Alert and oriented x3, normal affect and mood.        Ref. Range 2020 11:38   TSH Latest Ref Range: 0.380 - 5.330 uIU/mL 0.620   Free T-4 Latest Ref Range: 0.93 - 1.70 ng/dL 0.83 (L)   T3 Latest Ref Range: 60.0 - 181.0 ng/dL 89.5     2020 3:40 PM     HISTORY/REASON FOR EXAM:  Abnormal thyroid labs, mild anterior neck fullness     TECHNIQUE/EXAM DESCRIPTION:  Ultrasound of the soft tissues of the head and neck.     COMPARISON:  None     FINDINGS:  The thyroid gland is  heterogeneous.  Vascularity is normal.     The right lobe of the thyroid gland measures 1.11 cm x 3.64 cm x 1.41 cm. The contour and echogenicity are normal. No focal mass lesions are identified.  The left lobe of the thyroid gland measures 1.21 cm x 3.65 cm x 1.37 cm. The contour and echogenicity are normal. No focal mass lesions are identified.  The isthmus measures 0.32 cm.           IMPRESSION:     Negative thyroid ultrasound     Assessment and Plan:   The following treatment plan was discussed:       35 y.o. female with history of hypothyroidism, PCOS and anxiety.    1. Thyroid disorder  TSI    TSH    FREE THYROXINE    TRIIDOTHYRONINE   2. Anti-TPO antibodies present     3. History of hypothyroidism     4. Fatigue, unspecified type     5. Sleep difficulties     6. Nocturnal oxygen desaturation     7. Elevated hematocrit     -Thyroid disorder-with history of hypothyroidism.  Recent lab work shows thyroid labs appear to be normalizing.  Previously should to be hyperthyroid.  She is off thyroid medication at this time.  She is almost 9 months postpartum.  We will plan to monitor thyroid labs at this time, check in 4 to 6 weeks.  Patient may report if has any new or worsening symptoms.  Monitor closely at this time.  -Fatigue-multifactorial with history of thyroid disorder and caring for infant.  -Sleep difficulties-multifactorial.  Recent overnight oximetry shows dips in nocturnal oxygen levels.  Reviewed with patient options for further evaluation and therapy.  Discussed conditions for working on weight loss in future.  Patient declined further testing and nocturnal oxygen therapy at this time.  She will consider and let us know if desires to move forward testing. Reviewed sleep hygiene.   -Elevated hematocrit-mild. May be due to hydration and nocturnal oxygen desaturation.  Will monitor in future.  -Recommend continue working on self care with adequate nutrition and fluid intake, routine exercise, adequate sleep  and stress management.     Follow-up: 4 to 6 weeks, sooner as needed.           This medical record contains text that has been entered with the assistance of computer voice recognition and dictation software.  Therefore, it may contain unintended errors in text, spelling, punctuation, or grammar.

## 2020-10-23 ENCOUNTER — HOSPITAL ENCOUNTER (OUTPATIENT)
Dept: LAB | Facility: MEDICAL CENTER | Age: 35
End: 2020-10-23
Attending: FAMILY MEDICINE
Payer: COMMERCIAL

## 2020-10-23 DIAGNOSIS — E07.9 THYROID DISORDER: ICD-10-CM

## 2020-10-23 LAB
T3 SERPL-MCNC: 116 NG/DL (ref 60–181)
T4 FREE SERPL-MCNC: 0.56 NG/DL (ref 0.93–1.7)
TSH SERPL DL<=0.005 MIU/L-ACNC: 41 UIU/ML (ref 0.38–5.33)

## 2020-10-23 PROCEDURE — 84439 ASSAY OF FREE THYROXINE: CPT

## 2020-10-23 PROCEDURE — 84443 ASSAY THYROID STIM HORMONE: CPT

## 2020-10-23 PROCEDURE — 36415 COLL VENOUS BLD VENIPUNCTURE: CPT

## 2020-10-23 PROCEDURE — 84445 ASSAY OF TSI GLOBULIN: CPT

## 2020-10-23 PROCEDURE — 84480 ASSAY TRIIODOTHYRONINE (T3): CPT

## 2020-10-26 DIAGNOSIS — E07.9 THYROID DISORDER: ICD-10-CM

## 2020-10-27 LAB — TSI SER-ACNC: 0.55 IU/L

## 2020-10-29 ENCOUNTER — OFFICE VISIT (OUTPATIENT)
Dept: MEDICAL GROUP | Facility: IMAGING CENTER | Age: 35
End: 2020-10-29
Payer: COMMERCIAL

## 2020-10-29 VITALS
HEART RATE: 95 BPM | DIASTOLIC BLOOD PRESSURE: 72 MMHG | BODY MASS INDEX: 33.57 KG/M2 | TEMPERATURE: 98.1 F | HEIGHT: 60 IN | SYSTOLIC BLOOD PRESSURE: 110 MMHG | RESPIRATION RATE: 12 BRPM | OXYGEN SATURATION: 98 % | WEIGHT: 171 LBS

## 2020-10-29 DIAGNOSIS — N92.6 MENSTRUAL ABNORMALITY: ICD-10-CM

## 2020-10-29 DIAGNOSIS — L73.9 FOLLICULITIS: ICD-10-CM

## 2020-10-29 DIAGNOSIS — R76.8 ANTI-TPO ANTIBODIES PRESENT: ICD-10-CM

## 2020-10-29 DIAGNOSIS — L30.4 INTERTRIGO: ICD-10-CM

## 2020-10-29 DIAGNOSIS — R89.9 ABNORMAL LABORATORY TEST: ICD-10-CM

## 2020-10-29 DIAGNOSIS — E07.9 THYROID DISORDER: ICD-10-CM

## 2020-10-29 DIAGNOSIS — R63.5 WEIGHT GAIN: ICD-10-CM

## 2020-10-29 DIAGNOSIS — R05.8 OCCASIONAL COUGH: ICD-10-CM

## 2020-10-29 DIAGNOSIS — R53.82 CHRONIC FATIGUE: ICD-10-CM

## 2020-10-29 PROCEDURE — 99214 OFFICE O/P EST MOD 30 MIN: CPT | Performed by: FAMILY MEDICINE

## 2020-10-29 RX ORDER — LEVOTHYROXINE SODIUM 0.03 MG/1
25 TABLET ORAL
COMMUNITY
End: 2020-10-29

## 2020-10-29 RX ORDER — NYSTATIN 100000 [USP'U]/G
1 POWDER TOPICAL 4 TIMES DAILY
Qty: 15 G | Refills: 0 | Status: SHIPPED | OUTPATIENT
Start: 2020-10-29 | End: 2021-06-30

## 2020-10-29 RX ORDER — FLUTICASONE PROPIONATE 50 MCG
1 SPRAY, SUSPENSION (ML) NASAL DAILY
Qty: 16 G | Refills: 2 | Status: SHIPPED | OUTPATIENT
Start: 2020-10-29 | End: 2020-11-23

## 2020-10-29 RX ORDER — LEVOTHYROXINE SODIUM 0.05 MG/1
50 TABLET ORAL
Qty: 30 TAB | Refills: 2 | Status: SHIPPED | OUTPATIENT
Start: 2020-10-29 | End: 2020-11-23

## 2020-10-29 ASSESSMENT — FIBROSIS 4 INDEX: FIB4 SCORE: 0.65

## 2020-10-29 NOTE — PROGRESS NOTES
SUBJECTIVE:    Chief Complaint   Patient presents with   • Fatigue   • Lab Results   • Rash     boils under breast       HPI:     Milli Santizo is a 35 y.o. female with history of thyroid disorder, chronic fatigue, hx of anemia and low vitamin D, anxiety and PCOS here for follow up of thyroid lab results.   Patient states she was a little late getting her labs done this time. Notes it is difficult for her to continue to have to get lab work completed.     Thyroid disorder- previously with low TSH, discontinued levothyroxine 25 mcg daily, had improving TSH, then recent check with much increase in TSH.   She is now about 10 months postpartum.   Notes she has gained weight.   Continues to feel fatigued and restarted her menstrual period. Had period start about 2 weeks ago, but has continued to have somewhat of a period. Started around 10/12-13 with spotting, then was heavier until last week. Not as heavy but still was on her period, then not much flow today.     Rash under her breast- has used otc powder- gold bond- which has helped some. Can be itchy.   Rash/boils on her breast intermittently. States she has had since about June/July. Used warm compress on it which opened up and drained, but intermittently new ones form. Tried otc topical on the area without improvements. She is not breast feeding.     Occasional/intermittent dry cough- has had for a while, possibly triggered in throat. Not sure if has any significant nasal drainage, does not notice any significant nasal congestions.   2011 had CXR per [facility] for +PPD.       ROS:  Denies any recent fevers or chills. No nausea or vomiting. No diarrhea. No chest pains or shortness of breath. No lower extremity edema.    Current Outpatient Medications on File Prior to Visit   Medication Sig Dispense Refill   • Cholecalciferol (VITAMIN D) 2000 UNIT Tab Take  by mouth every day.     • metFORMIN (GLUCOPHAGE) 500 MG Tab Take 1 Tab by mouth 2 times a day, with  meals. 60 Tab 3   • Prenatal Multivit-Min-Fe-FA (PRENATAL VITAMINS PO) Take  by mouth.       No current facility-administered medications on file prior to visit.        No Known Allergies    Patient Active Problem List    Diagnosis Date Noted   • History of hypothyroidism 09/23/2020   • Lactation problem 01/24/2020   • Lactation suppression 01/24/2020   • Slow transit constipation 08/08/2019   • Normocytic anemia 08/08/2019   • Vitamin D insufficiency 08/08/2019   • Chronic fatigue 04/23/2019   • PCOS (polycystic ovarian syndrome) 04/26/2018   • Meralgia paresthetica of right side 06/14/2017   • Generalized anxiety disorder 06/14/2017   • Hypothyroidism (acquired) 04/05/2017   • Acne vulgaris 04/05/2017   • 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   • Family planning 09/05/2013   • Positive PPD        Past Medical History:   Diagnosis Date   • Anxiety    • PCOS (polycystic ovarian syndrome)    • Positive PPD    • Thyroid disease        OBJECTIVE:   /72   Pulse 95   Temp 36.7 °C (98.1 °F)   Resp 12   Ht 1.524 m (5')   Wt 77.6 kg (171 lb)   LMP 10/11/2020 (Exact Date)   SpO2 98%   BMI 33.40 kg/m²   General: Well-developed well-nourished female, no acute distress  HEENT: oropharynx clear, eyes clear, TMs clear and intact. Has small posterior oropharynx opening, appears to have a long uvula that extends beyond visible tongue base.   Neck: supple, no lymphadenopathy- cervical or supraclavicular. Slight anterior neck fullness present on prior exam.   Cardiovascular: regular rate and rhythm, no murmurs, gallops, rubs  Lungs: clear to auscultation bilaterally, no wheezes, crackles, or rhonchi  Abdomen: +bowel sounds, soft, nontender, nondistended, no rebound, no guarding, no hepatosplenomegaly  Extremities: no cyanosis, clubbing, edema  Skin: Warm and dry. Area under bilateral breast region with slight moisture and pink coloration. Right side with a few areas that appear inflamed around the hair follicles, right  superior lateral side of breast with what appears to be a resolving inflamed lesion.   Psych: appropriate mood and affect       Ref. Range 8/12/2020 10:19 9/12/2020 11:38 10/23/2020 15:42   TSH Latest Ref Range: 0.380 - 5.330 uIU/mL 0.015 (L) 0.620 41.000 (H)   Free T-4 Latest Ref Range: 0.93 - 1.70 ng/dL 2.82 (H) 0.83 (L) 0.56 (L)   T3 Latest Ref Range: 60.0 - 181.0 ng/dL 227.0 (H) 89.5 116.0   Thyroid Stim Immunoglobulin Latest Ref Range: <=0.54 IU/L   0.55 (H)   Microsomal -Tpo- Abs Latest Ref Range: 0.0 - 9.0 IU/mL 93.0 (H)     Anti-Thyroglobulin Latest Ref Range: 0.0 - 4.0 IU/mL <0.9       Narrative & Impression        9/12/2020 3:40 PM     HISTORY/REASON FOR EXAM:  Abnormal thyroid labs, mild anterior neck fullness     TECHNIQUE/EXAM DESCRIPTION:  Ultrasound of the soft tissues of the head and neck.     COMPARISON:  None     FINDINGS:  The thyroid gland is heterogeneous.  Vascularity is normal.     The right lobe of the thyroid gland measures 1.11 cm x 3.64 cm x 1.41 cm. The contour and echogenicity are normal. No focal mass lesions are identified.  The left lobe of the thyroid gland measures 1.21 cm x 3.65 cm x 1.37 cm. The contour and echogenicity are normal. No focal mass lesions are identified.  The isthmus measures 0.32 cm.           IMPRESSION:     Negative thyroid ultrasound       ASSESSMENT/PLAN:    35 y.o. female with history of thyroid disorder, chronic fatigue, hx of anemia and low vitamin D, anxiety and PCOS.      1. Thyroid disorder  REFERRAL TO ENDOCRINOLOGY    CBC WITHOUT DIFFERENTIAL    TSH    FREE THYROXINE    TRIIDOTHYRONINE   2. Abnormal laboratory test     3. Anti-TPO antibodies present  REFERRAL TO ENDOCRINOLOGY   4. Weight gain  TSH    FREE THYROXINE    TRIIDOTHYRONINE   5. Menstrual abnormality  CBC WITHOUT DIFFERENTIAL    TSH    FREE THYROXINE    TRIIDOTHYRONINE   6. Chronic fatigue  TSH    FREE THYROXINE    TRIIDOTHYRONINE   7. Folliculitis  mupirocin (BACTROBAN) 2 % Ointment   8.  Intertrigo  nystatin (MYCOSTATIN) powder   9. Occasional cough  fluticasone (FLONASE) 50 MCG/ACT nasal spray   -Thyroid disorder- prior history of hypothyroidism.   Patient was previously on levothyroxine 25 mcg and was about 8-9 months post partum in August 2020. At that time labs showed decreased TSH, thus discontinued levothyroxine 25 mcg daily. TSH was improving at repeat check but still low. Then her most recent lab showed significant increase in TSH. Prior thyroid u/s negative, TPO Abs +, antithyroglobulin negative, TSI slightly elevated. Appears to be evolving process.   Planning for referral to endo at this point which was discussed with patient. Discussed restarting her levothyroxine at 50 mcg daily for now until further direction given.   -Weight gain and menstrual abnormality - appears due to thyroid abnormalities at this time. Patient does have history of PCOS. Menstrual cycle bleeding appears to be lessening.   If no further improvements, plan for CBC.   -Chronic fatigue- multifactorial, current thyroid issues but other stressors and factors likely contributing.  Vitals stable per patient flowsheet.   Will continue to monitor, continue self care activities.   -Folliculitis- of breast area. Bactroban as needed.   -Intertrigo-under breast area. Nystatin powder.   -Occasional cough-has been a chronic issue, suspect due to aggravating factor in throat. Hx of BCG vaccine, negative CXR 2011- done due to +PPD.   Discussed trial of flonase. Consider also due to elongated uvula. If no further improvements, will consider consult with ENT.     Return in about 1 month (around 11/29/2020).   Follow up sooner as needed.    This medical record contains text that has been entered with the assistance of computer voice recognition and dictation software.  Therefore, it may contain unintended errors in text, spelling, punctuation, or grammar.

## 2020-10-31 PROBLEM — Z34.90 PREGNANCY: Status: RESOLVED | Noted: 2019-10-24 | Resolved: 2020-10-31

## 2020-11-02 DIAGNOSIS — N92.6 MENSTRUAL ABNORMALITY: ICD-10-CM

## 2020-11-02 DIAGNOSIS — R63.5 WEIGHT GAIN: ICD-10-CM

## 2020-11-02 DIAGNOSIS — E07.9 THYROID DISORDER: ICD-10-CM

## 2020-11-02 DIAGNOSIS — R76.8 ANTI-TPO ANTIBODIES PRESENT: ICD-10-CM

## 2020-11-02 DIAGNOSIS — R79.89 TSH ELEVATION: ICD-10-CM

## 2020-11-25 ENCOUNTER — TELEMEDICINE (OUTPATIENT)
Dept: MEDICAL GROUP | Facility: IMAGING CENTER | Age: 35
End: 2020-11-25
Payer: COMMERCIAL

## 2020-11-25 VITALS — BODY MASS INDEX: 33.4 KG/M2 | HEIGHT: 60 IN

## 2020-11-25 DIAGNOSIS — N92.6 MENSTRUAL ABNORMALITY: ICD-10-CM

## 2020-11-25 DIAGNOSIS — E07.9 THYROID DISORDER: ICD-10-CM

## 2020-11-25 DIAGNOSIS — L30.4 INTERTRIGO: ICD-10-CM

## 2020-11-25 DIAGNOSIS — R79.89 TSH ELEVATION: ICD-10-CM

## 2020-11-25 DIAGNOSIS — L70.0 CYSTIC ACNE: ICD-10-CM

## 2020-11-25 PROCEDURE — 99213 OFFICE O/P EST LOW 20 MIN: CPT | Mod: 95,CR | Performed by: FAMILY MEDICINE

## 2020-11-25 RX ORDER — MINOCYCLINE HYDROCHLORIDE 50 MG/1
50 TABLET ORAL 2 TIMES DAILY
Qty: 60 TAB | Refills: 1 | Status: SHIPPED | OUTPATIENT
Start: 2020-11-25 | End: 2021-06-30

## 2020-11-25 ASSESSMENT — PAIN SCALES - GENERAL: PAINLEVEL: NO PAIN

## 2020-11-26 NOTE — PROGRESS NOTES
Telemedicine Visit: Established Patient     This encounter was conducted via Zoom .   Verbal consent was obtained. Patient's identity was verified.    Subjective:     Chief Complaint   Patient presents with   • Acne     medication not helping, rash cleared, but acne still present   • Dysmenorrhea     bleeding last       Milli Santizo is a 35 y.o. female with thyroid disorder here for follow up and symptoms of acne of breast area.     Right breast area with acne type lesions. Has tried prior topical-bactroban but is not improving.  Has been wearing a cotton bra. States area might dry up and then ooze again.   She did also use the nystatin powder for under her breast, which helped.     Thyroid disorder- has been taking levothyroxine 50 mcg daily, tolerating well. Notes she has been losing some of her hair. Has 11 month old at home.   TSH was low in 8/2020, then increased significantly in 10/2020 thus restarted on medication therapy.   Previously with prolonged menses which has improved since being on the increased dose of levothyroxine.   States she has not heard from the endocrinology office to schedule.     ROS   Denies any recent fevers or chills. No nausea or vomiting. No chest pains or shortness of breath.     No Known Allergies    Current medicines (including changes today)  Current Outpatient Medications   Medication Sig Dispense Refill   • minocycline (DYNACIN) 50 MG tablet Take 1 Tab by mouth 2 times a day. 60 Tab 1   • levothyroxine (SYNTHROID) 50 MCG Tab TAKE 1 TABLET BY MOUTH EVERY MORNING ON AN EMPTY STOMACH 30 Tab 2   • nystatin (MYCOSTATIN) powder Apply 1 g to affected area(s) 4 times a day. 15 g 0   • Cholecalciferol (VITAMIN D) 2000 UNIT Tab Take  by mouth every day.     • metFORMIN (GLUCOPHAGE) 500 MG Tab Take 1 Tab by mouth 2 times a day, with meals. 60 Tab 3   • Prenatal Multivit-Min-Fe-FA (PRENATAL VITAMINS PO) Take  by mouth.       No current facility-administered medications for this visit.         Patient Active Problem List    Diagnosis Date Noted   • History of hypothyroidism 2020   • Lactation problem 2020   • Lactation suppression 2020   • Slow transit constipation 2019   • Normocytic anemia 2019   • Vitamin D insufficiency 2019   • Chronic fatigue 2019   • PCOS (polycystic ovarian syndrome) 2018   • Meralgia paresthetica of right side 2017   • Generalized anxiety disorder 2017   • Hypothyroidism (acquired) 2017   • Acne vulgaris 2017   • Psychologic vaginismus 2017   • Family planning 2013   • Positive PPD        Family History   Problem Relation Age of Onset   • Arthritis Mother    • Thyroid Mother         hypothyroid   • Hypertension Father    • Arthritis Maternal Grandmother    • Hypertension Paternal Grandmother        Patient  has a past medical history of Anxiety, PCOS (polycystic ovarian syndrome), Positive PPD, and Thyroid disease. She also has no past medical history of Alcohol abuse, Alcoholism (HCC), Depression, Psychosis (HCC), or Substance abuse (HCC).  Patient  has a past surgical history that includes pr  delivery only (N/A, 2019).       Objective:   Vitals obtained by patient:  Ht 1.524 m (5')   BMI 33.40 kg/m²     Physical Exam:  Constitutional: Alert, no distress, well-groomed.  Skin: No rashes in visible areas.  Eye: Round. Conjunctiva clear, lids normal. No icterus.   ENMT: Lips pink without lesions. Phonation normal.  Neck: No masses, no thyromegaly. Moves freely without pain.  Respiratory: Unlabored respiratory effort, no cough or audible wheeze  Psych: Alert and oriented x3, normal affect and mood.   Skin: under right breast area with a few erythematous lesions noted.       Assessment and Plan:   The following treatment plan was discussed:     35 y.o. female with thyroid disorder.     1. Cystic acne  minocycline (DYNACIN) 50 MG tablet   2. Intertrigo     3. Thyroid disorder      4. TSH elevation     5. Menstrual abnormality     -Cystic acne- prior topical therapy did not help.   Discussed options of therapy. Will give trial on minocycline therapy. Reviewed possible side effects of medication. Monitor.   -Intertrigo- improved. Nystatin as needed.   -Thyroid disorder- currently on levothyroxine 50 mcg, restarted due to increase in TSH, prior to that had decrease in TSH. Continue current medication. Recommend recheck labs to continue to monitor.   Has referral to endocrinology, but has not yet received call. Discussed possible referral to other provider.   -Menstrual abnormality- improved, appears associated with thyroid issues, improved on thyroid medication.     Follow-up: Return in about 1 month (around 12/25/2020).           This medical record contains text that has been entered with the assistance of computer voice recognition and dictation software.  Therefore, it may contain unintended errors in text, spelling, punctuation, or grammar.

## 2020-12-12 ENCOUNTER — HOSPITAL ENCOUNTER (OUTPATIENT)
Dept: LAB | Facility: MEDICAL CENTER | Age: 35
End: 2020-12-12
Attending: FAMILY MEDICINE
Payer: COMMERCIAL

## 2020-12-12 DIAGNOSIS — N92.6 MENSTRUAL ABNORMALITY: ICD-10-CM

## 2020-12-12 DIAGNOSIS — R53.82 CHRONIC FATIGUE: ICD-10-CM

## 2020-12-12 DIAGNOSIS — R63.5 WEIGHT GAIN: ICD-10-CM

## 2020-12-12 DIAGNOSIS — E07.9 THYROID DISORDER: ICD-10-CM

## 2020-12-12 LAB
T3 SERPL-MCNC: 111 NG/DL (ref 60–181)
T4 FREE SERPL-MCNC: 1.3 NG/DL (ref 0.93–1.7)
TSH SERPL DL<=0.005 MIU/L-ACNC: 4.38 UIU/ML (ref 0.38–5.33)

## 2020-12-12 PROCEDURE — 84443 ASSAY THYROID STIM HORMONE: CPT

## 2020-12-12 PROCEDURE — 36415 COLL VENOUS BLD VENIPUNCTURE: CPT

## 2020-12-12 PROCEDURE — 84439 ASSAY OF FREE THYROXINE: CPT

## 2020-12-12 PROCEDURE — 84480 ASSAY TRIIODOTHYRONINE (T3): CPT

## 2021-04-17 ENCOUNTER — HOSPITAL ENCOUNTER (OUTPATIENT)
Dept: LAB | Facility: MEDICAL CENTER | Age: 36
End: 2021-04-17
Attending: PHYSICIAN ASSISTANT
Payer: COMMERCIAL

## 2021-04-17 LAB
ALBUMIN SERPL BCP-MCNC: 4.2 G/DL (ref 3.2–4.9)
ALBUMIN/GLOB SERPL: 1.2 G/DL
ALP SERPL-CCNC: 82 U/L (ref 30–99)
ALT SERPL-CCNC: 20 U/L (ref 2–50)
ANION GAP SERPL CALC-SCNC: 11 MMOL/L (ref 7–16)
AST SERPL-CCNC: 20 U/L (ref 12–45)
BILIRUB SERPL-MCNC: 0.4 MG/DL (ref 0.1–1.5)
BUN SERPL-MCNC: 8 MG/DL (ref 8–22)
CALCIUM SERPL-MCNC: 9.3 MG/DL (ref 8.5–10.5)
CHLORIDE SERPL-SCNC: 105 MMOL/L (ref 96–112)
CO2 SERPL-SCNC: 21 MMOL/L (ref 20–33)
CREAT SERPL-MCNC: 0.61 MG/DL (ref 0.5–1.4)
EST. AVERAGE GLUCOSE BLD GHB EST-MCNC: 117 MG/DL
GLOBULIN SER CALC-MCNC: 3.5 G/DL (ref 1.9–3.5)
GLUCOSE SERPL-MCNC: 98 MG/DL (ref 65–99)
HBA1C MFR BLD: 5.7 % (ref 4–5.6)
POTASSIUM SERPL-SCNC: 4.1 MMOL/L (ref 3.6–5.5)
PROT SERPL-MCNC: 7.7 G/DL (ref 6–8.2)
SODIUM SERPL-SCNC: 137 MMOL/L (ref 135–145)
T4 FREE SERPL-MCNC: 1.2 NG/DL (ref 0.93–1.7)
TSH SERPL DL<=0.005 MIU/L-ACNC: 2.78 UIU/ML (ref 0.38–5.33)

## 2021-04-17 PROCEDURE — 84443 ASSAY THYROID STIM HORMONE: CPT

## 2021-04-17 PROCEDURE — 80053 COMPREHEN METABOLIC PANEL: CPT

## 2021-04-17 PROCEDURE — 82306 VITAMIN D 25 HYDROXY: CPT

## 2021-04-17 PROCEDURE — 36415 COLL VENOUS BLD VENIPUNCTURE: CPT

## 2021-04-17 PROCEDURE — 84439 ASSAY OF FREE THYROXINE: CPT

## 2021-04-17 PROCEDURE — 86376 MICROSOMAL ANTIBODY EACH: CPT

## 2021-04-17 PROCEDURE — 83036 HEMOGLOBIN GLYCOSYLATED A1C: CPT

## 2021-04-19 LAB
25(OH)D3 SERPL-MCNC: 25 NG/ML (ref 30–80)
THYROPEROXIDASE AB SERPL-ACNC: 46.3 IU/ML (ref 0–9)

## 2021-06-04 DIAGNOSIS — R71.8 ELEVATED HEMATOCRIT: ICD-10-CM

## 2021-06-22 ENCOUNTER — HOSPITAL ENCOUNTER (OUTPATIENT)
Dept: LAB | Facility: MEDICAL CENTER | Age: 36
End: 2021-06-22
Attending: FAMILY MEDICINE
Payer: COMMERCIAL

## 2021-06-22 DIAGNOSIS — R71.8 ELEVATED HEMATOCRIT: ICD-10-CM

## 2021-06-22 LAB
ERYTHROCYTE [DISTWIDTH] IN BLOOD BY AUTOMATED COUNT: 42.5 FL (ref 35.9–50)
HCT VFR BLD AUTO: 44.6 % (ref 37–47)
HGB BLD-MCNC: 13.9 G/DL (ref 12–16)
MCH RBC QN AUTO: 26.3 PG (ref 27–33)
MCHC RBC AUTO-ENTMCNC: 31.2 G/DL (ref 33.6–35)
MCV RBC AUTO: 84.5 FL (ref 81.4–97.8)
PLATELET # BLD AUTO: 235 K/UL (ref 164–446)
PMV BLD AUTO: 13.9 FL (ref 9–12.9)
RBC # BLD AUTO: 5.28 M/UL (ref 4.2–5.4)
WBC # BLD AUTO: 10 K/UL (ref 4.8–10.8)

## 2021-06-22 PROCEDURE — 85027 COMPLETE CBC AUTOMATED: CPT

## 2021-06-22 PROCEDURE — 36415 COLL VENOUS BLD VENIPUNCTURE: CPT

## 2021-06-30 ENCOUNTER — OFFICE VISIT (OUTPATIENT)
Dept: MEDICAL GROUP | Facility: IMAGING CENTER | Age: 36
End: 2021-06-30
Payer: COMMERCIAL

## 2021-06-30 ENCOUNTER — HOSPITAL ENCOUNTER (OUTPATIENT)
Facility: MEDICAL CENTER | Age: 36
End: 2021-06-30
Attending: FAMILY MEDICINE
Payer: COMMERCIAL

## 2021-06-30 VITALS
RESPIRATION RATE: 12 BRPM | BODY MASS INDEX: 32.39 KG/M2 | SYSTOLIC BLOOD PRESSURE: 112 MMHG | DIASTOLIC BLOOD PRESSURE: 72 MMHG | TEMPERATURE: 97.3 F | HEART RATE: 80 BPM | HEIGHT: 60 IN | WEIGHT: 165 LBS | OXYGEN SATURATION: 98 %

## 2021-06-30 DIAGNOSIS — Z13.220 SCREENING, LIPID: ICD-10-CM

## 2021-06-30 DIAGNOSIS — L70.0 CYSTIC ACNE: ICD-10-CM

## 2021-06-30 DIAGNOSIS — N94.9 VAGINAL SYMPTOM: ICD-10-CM

## 2021-06-30 DIAGNOSIS — R73.01 IMPAIRED FASTING GLUCOSE: ICD-10-CM

## 2021-06-30 DIAGNOSIS — E55.9 VITAMIN D INSUFFICIENCY: ICD-10-CM

## 2021-06-30 DIAGNOSIS — D22.5 ATYPICAL NEVUS OF ABDOMINAL WALL: ICD-10-CM

## 2021-06-30 DIAGNOSIS — Z13.0 SCREENING FOR DEFICIENCY ANEMIA: ICD-10-CM

## 2021-06-30 DIAGNOSIS — E03.9 HYPOTHYROIDISM (ACQUIRED): ICD-10-CM

## 2021-06-30 DIAGNOSIS — R53.82 CHRONIC FATIGUE: ICD-10-CM

## 2021-06-30 DIAGNOSIS — Z12.4 SCREENING FOR CERVICAL CANCER: ICD-10-CM

## 2021-06-30 DIAGNOSIS — Z01.419 WELL WOMAN EXAM WITH ROUTINE GYNECOLOGICAL EXAM: ICD-10-CM

## 2021-06-30 PROCEDURE — 88175 CYTOPATH C/V AUTO FLUID REDO: CPT

## 2021-06-30 PROCEDURE — 87624 HPV HI-RISK TYP POOLED RSLT: CPT

## 2021-06-30 PROCEDURE — 99395 PREV VISIT EST AGE 18-39: CPT | Performed by: FAMILY MEDICINE

## 2021-06-30 RX ORDER — LEVOTHYROXINE SODIUM 0.07 MG/1
TABLET ORAL
COMMUNITY
Start: 2021-06-25 | End: 2023-10-23 | Stop reason: SDUPTHER

## 2021-06-30 ASSESSMENT — PATIENT HEALTH QUESTIONNAIRE - PHQ9: CLINICAL INTERPRETATION OF PHQ2 SCORE: 0

## 2021-06-30 ASSESSMENT — FIBROSIS 4 INDEX: FIB4 SCORE: 0.65

## 2021-06-30 ASSESSMENT — PAIN SCALES - GENERAL: PAINLEVEL: 3=SLIGHT PAIN

## 2021-06-30 NOTE — PROGRESS NOTES
Chief Complaint   Patient presents with   • Fatigue     worsening        HPI:  36 y.o. female for routine well and gynecological exam.     Hypothyroidism- Dr. Boyd- hashimoto's, levothyroxine 75 mcg dose recently. Has had dryness of hair and skin, etc. Told she could drop 1 day to 50 mcg of levothyroxine, but stayed on 75 mcg daily.     Fatigue, chronic issue but more so past month.  Had overnight oximetry done in past 9/2020. Not proper eating or sleep schedule. States she will fall asleep while sitting.   Has 18 month old at home, picky eater. Working full time-May, going to school and has housework to do. Stressed. Break from school and wondering about her job.     Had some vaginal issues- itching/burning, OTC  vagisil helped. Symptoms have improved.     Impaired fasting glucose. Will need labs completed.     Vitamin D low- takes 5000 IU 1-2 times a week.     Cystic acne - gets under breast area. Has been overall stable.     Abdominal nevus- states has always had lesion. No specific changes she has noted.     Patient's last menstrual period was 06/19/2021 (exact date).   No menses in May.     Lifestyle:  Diet: not eating overall properly, diet has been off and on  Exercise/Activities: mostly housework  Stressors: work, school, has 18 month old  Social Support: lives with daughter and   Work: medical billing    Health Maintenance:  Last pap: 2017- unsatisfactory, hpv negative.   Immunizations:as below.     Health Maintenance Summary                PAP SMEAR Overdue 11/30/2020      Done 11/30/2017 THINPREP PAP WITH HPV     Patient has more history with this topic...    IMM DTaP/Tdap/Td Vaccine Next Due 10/7/2029      Done 10/7/2019 Imm Admin: Tdap Vaccine          Immunization History   Administered Date(s) Administered   • HPV Quadrivalent Vaccine (GARDASIL) - HISTORICAL DATA 11/20/2013   • INFLUENZA TIV (IM) 01/25/2013, 10/21/2013   • Influenza (IM) Preservative Free - HISTORICAL DATA 10/07/2019   •  Influenza Seasonal Injectable - Historical Data 10/21/2013, 11/08/2014   • Influenza Vaccine Pediatric Split - Historical Data 10/21/2013, 11/08/2014   • Influenza Vaccine Quad Inj (Pf) 10/06/2017, 10/23/2018, 11/01/2020   • Influenza Vaccine Quad Inj (Preserved) 10/18/2016   • MMR Vaccine 11/13/2012, 12/13/2012   • Moderna SARS-CoV-2 Vaccine 04/14/2021, 05/12/2021   • Tdap Vaccine 10/07/2019   • Tuberculin Skin Test 04/01/2011         Review of Systems   Constitutional: Negative for fever, chills and malaise.  HENT: Negative for congestion, sore throat, or swallowing issues.   Eyes: Negative for pain or vision changes.   Respiratory: Negative for cough and shortness of breath.    Cardiovascular: Negative for leg swelling. No chest pain.   Gastrointestinal: Negative for nausea, vomiting, abdominal pain and diarrhea.   Genitourinary: Negative for dysuria and hematuria.   Skin: Negative for rash.   Neurological: Negative for dizziness, focal weakness and headaches.   Endo/Heme/Allergies: Does not bruise/bleed easily.   Psychiatric/Behavioral: Negative for depression.  The patient is not nervous/anxious.          Current Outpatient Medications:   •  levothyroxine (SYNTHROID) 75 MCG Tab, , Disp: , Rfl:   •  metFORMIN (GLUCOPHAGE) 500 MG Tab, TAKE 1 TABLET BY MOUTH TWICE A DAY WITH MEALS, Disp: 180 Tab, Rfl: 1  •  Cholecalciferol (VITAMIN D) 2000 UNIT Tab, Take  by mouth every day., Disp: , Rfl:   •  Prenatal Multivit-Min-Fe-FA (PRENATAL VITAMINS PO), Take  by mouth., Disp: , Rfl:     No Known Allergies    Patient Active Problem List   Diagnosis   • Positive PPD   • Family planning   • Psychologic vaginismus   • Hypothyroidism (acquired)   • Acne vulgaris   • Meralgia paresthetica of right side   • Generalized anxiety disorder   • PCOS (polycystic ovarian syndrome)   • Chronic fatigue   • Slow transit constipation   • Normocytic anemia   • Vitamin D insufficiency   • Lactation problem   • Lactation suppression   •  History of hypothyroidism   • Impaired fasting glucose     OB History    Para Term  AB Living   1 1 1 0 0 1   SAB TAB Ectopic Molar Multiple Live Births   0 0 0 0 0 1         Past Medical History:   Diagnosis Date   • Anxiety    • PCOS (polycystic ovarian syndrome)    • Positive PPD    • Thyroid disease        Past Surgical History:   Procedure Laterality Date   • PB  DELIVERY ONLY N/A 2019    Procedure:  SECTION, PRIMARY;  Surgeon: Nadira Abrams M.D.;  Location: [facility] DEL[facility];  Service: Labor and Delivery       Family History   Problem Relation Age of Onset   • Arthritis Mother    • Thyroid Mother         hypothyroid   • Hypertension Father    • Arthritis Maternal Grandmother    • Hypertension Paternal Grandmother        Social History     Socioeconomic History   • Marital status:      Spouse name: Not on file   • Number of children: 0   • Years of education: Not on file   • Highest education level: Not on file   Occupational History   • Occupation: Health Information Management     Employer: [employer]   Tobacco Use   • Smoking status: Never Smoker   • Smokeless tobacco: Never Used   Vaping Use   • Vaping Use: Never used   Substance and Sexual Activity   • Alcohol use: Yes     Alcohol/week: 0.6 oz     Types: 1 Glasses of wine per week     Comment: infrequent, drinks 2-3 times a month   • Drug use: No   • Sexual activity: Yes     Partners: Male   Other Topics Concern   • Not on file   Social History Narrative   • Not on file     Social Determinants of Health     Financial Resource Strain:    • Difficulty of Paying Living Expenses:    Food Insecurity:    • Worried About Running Out of Food in the Last Year:    • Ran Out of Food in the Last Year:    Transportation Needs:    • Lack of Transportation (Medical):    • Lack of Transportation (Non-Medical):    Physical Activity: Unknown   • Days of Exercise per Week: 0 days   • Minutes of Exercise per Session: Not on file    Stress: Stress Concern Present   • Feeling of Stress : To some extent   Social Connections:    • Frequency of Communication with Friends and Family:    • Frequency of Social Gatherings with Friends and Family:    • Attends Denominational Services:    • Active Member of Clubs or Organizations:    • Attends Club or Organization Meetings:    • Marital Status:    Intimate Partner Violence:    • Fear of Current or Ex-Partner:    • Emotionally Abused:    • Physically Abused:    • Sexually Abused:          PHYSICAL EXAM:  /72   Pulse 80   Temp 36.3 °C (97.3 °F)   Resp 12   Ht 1.524 m (5')   Wt 74.8 kg (165 lb)   LMP 06/19/2021 (Exact Date)   SpO2 98%   BMI 32.22 kg/m²   Constitutional: She appears well-developed and well-nourished. She appears not diaphoretic. No distress.   HENT: Right Ear: External ear normal. Left Ear: External ear normal. Tympanic membranes clear and intact.   Nose: Nose normal.   Mouth/Throat: Oropharynx is clear and moist. No oropharyngeal exudate.     Eyes: Conjunctivae and extraocular motions are normal. Pupils are equal, round, and reactive to light. No scleral icterus.   Neck: Normal range of motion. Neck supple. No thyromegaly present.   Cardiovascular: Normal rate, regular rhythm, normal heart sounds and intact distal pulses.  Exam reveals no gallop and no friction rub.  No murmur heard.  Pulmonary/Chest: Effort normal and breath sounds normal. No respiratory distress. She has no wheezes. She has no rales.   Abdominal: Soft. Bowel sounds are normal. She exhibits no distension and no mass. No tenderness. She has no rebound and no guarding.   Lymphadenopathy:  She has no cervical adenopathy.   Neurological: She is alert. She has normal reflexes. No cranial nerve deficit. She exhibits normal muscle tone.   Skin: Skin is warm and dry. No rash noted. She is not diaphoretic. No erythema. Above umbilicus with ~ 1x1 cm hyperpigmented slightly raised lesion, slightly flower petal type  pattern.   Psychiatric: She has a normal mood and affect. Her behavior is normal.   Musculoskeletal: She exhibits no edema. Full strength throughout. 2+ DTR throughout.   BREAST EXAM: Inspection negative. No nipple discharge or bleeding. No masses or nodularity palpable. No axillary PARESH. Healing skin lesions under breast area.   PELVIC EXAM: External genitalia and vagina normal. Normal appearing cervix- ectropian. No abnormal discharge. No cervical motion tenderness. Bimanual exam without adnexal masses or tenderness to palpation.        Ref. Range 6/22/2021 15:29   WBC Latest Ref Range: 4.8 - 10.8 K/uL 10.0   RBC Latest Ref Range: 4.20 - 5.40 M/uL 5.28   Hemoglobin Latest Ref Range: 12.0 - 16.0 g/dL 13.9   Hematocrit Latest Ref Range: 37.0 - 47.0 % 44.6   MCV Latest Ref Range: 81.4 - 97.8 fL 84.5   MCH Latest Ref Range: 27.0 - 33.0 pg 26.3 (L)   MCHC Latest Ref Range: 33.6 - 35.0 g/dL 31.2 (L)   RDW Latest Ref Range: 35.9 - 50.0 fL 42.5   Platelet Count Latest Ref Range: 164 - 446 K/uL 235   MPV Latest Ref Range: 9.0 - 12.9 fL 13.9 (H)       ASSESSMENT/PLAN:    This is a 36 y.o. female for routine well and gynecological exam.     1. Well woman exam with routine gynecological exam  Lipid Profile    VITAMIN D,25 HYDROXY    CBC WITH DIFFERENTIAL    Comp Metabolic Panel    HEMOGLOBIN A1C   2. Hypothyroidism (acquired) - last labs stable. Continue on levothyroxine 75 mcg daily. Followed by endocrinology.     3. Chronic fatigue- appears multifactorial. Current stressors likely contributing.   -Recommend working on self care with adequate nutrition and fluid intake, routine exercise, adequate sleep and stress management. Consider modifying work activities and reduce work load. Will obtain home sleep test.     4. Vaginal symptom - improved with otc medication. Follow up as needed if any worsening symptoms.     5. Impaired fasting glucose   -Recommend low sugar/refined carbohydrate diet and routine exercise.  Comp Metabolic  Panel    HEMOGLOBIN A1C   6. Vitamin D insufficiency -assess lab work.  VITAMIN D,25 HYDROXY   7. Cystic acne- under breast area. Stable. Monitor.     8. Atypical nevus of abdominal wall - appears slightly atypical appearance.   Refer to dermatology for further evaluation and monitoring.  REFERRAL TO DERMATOLOGY   9. Screening, lipid  Lipid Profile   10. Screening for deficiency anemia  CBC WITH DIFFERENTIAL   11. Screening for cervical cancer  THINPREP PAP WITH HPV     Follow up after home sleep test.   Complete labs in 3 months.   Recommend routine well and gynecological exam in future.     This medical record contains text that has been entered with the assistance of computer voice recognition and dictation software.  Therefore, it may contain unintended errors in text, spelling, punctuation, or grammar.

## 2021-07-17 ENCOUNTER — HOSPITAL ENCOUNTER (OUTPATIENT)
Dept: LAB | Facility: MEDICAL CENTER | Age: 36
End: 2021-07-17
Attending: PHYSICIAN ASSISTANT
Payer: COMMERCIAL

## 2021-07-17 LAB
T4 FREE SERPL-MCNC: 1.37 NG/DL (ref 0.93–1.7)
TSH SERPL DL<=0.005 MIU/L-ACNC: 1.83 UIU/ML (ref 0.38–5.33)

## 2021-07-17 PROCEDURE — 36415 COLL VENOUS BLD VENIPUNCTURE: CPT

## 2021-07-17 PROCEDURE — 84443 ASSAY THYROID STIM HORMONE: CPT

## 2021-07-17 PROCEDURE — 84439 ASSAY OF FREE THYROXINE: CPT

## 2021-11-20 ENCOUNTER — HOSPITAL ENCOUNTER (OUTPATIENT)
Dept: LAB | Facility: MEDICAL CENTER | Age: 36
End: 2021-11-20
Attending: PHYSICIAN ASSISTANT
Payer: COMMERCIAL

## 2021-11-20 LAB
T4 FREE SERPL-MCNC: 1.52 NG/DL (ref 0.93–1.7)
TSH SERPL DL<=0.005 MIU/L-ACNC: 1.98 UIU/ML (ref 0.38–5.33)

## 2021-11-20 PROCEDURE — 36415 COLL VENOUS BLD VENIPUNCTURE: CPT

## 2021-11-20 PROCEDURE — 84443 ASSAY THYROID STIM HORMONE: CPT

## 2021-11-20 PROCEDURE — 84439 ASSAY OF FREE THYROXINE: CPT

## 2022-03-21 ENCOUNTER — HOSPITAL ENCOUNTER (OUTPATIENT)
Dept: LAB | Facility: MEDICAL CENTER | Age: 37
End: 2022-03-21
Attending: INTERNAL MEDICINE
Payer: COMMERCIAL

## 2022-03-21 LAB
EST. AVERAGE GLUCOSE BLD GHB EST-MCNC: 114 MG/DL
HBA1C MFR BLD: 5.6 % (ref 4–5.6)
TSH SERPL DL<=0.005 MIU/L-ACNC: 1.45 UIU/ML (ref 0.38–5.33)

## 2022-03-21 PROCEDURE — 83036 HEMOGLOBIN GLYCOSYLATED A1C: CPT

## 2022-03-21 PROCEDURE — 36415 COLL VENOUS BLD VENIPUNCTURE: CPT

## 2022-03-21 PROCEDURE — 84443 ASSAY THYROID STIM HORMONE: CPT

## 2022-03-22 ENCOUNTER — APPOINTMENT (OUTPATIENT)
Dept: DERMATOLOGY | Facility: IMAGING CENTER | Age: 37
End: 2022-03-22

## 2022-04-22 ENCOUNTER — TELEPHONE (OUTPATIENT)
Dept: MEDICAL GROUP | Facility: IMAGING CENTER | Age: 37
End: 2022-04-22
Payer: COMMERCIAL

## 2022-04-22 DIAGNOSIS — Z11.52 ENCOUNTER FOR SCREENING FOR COVID-19: ICD-10-CM

## 2022-04-22 NOTE — TELEPHONE ENCOUNTER
Received message from patient requesting an order for a Covid RT PCR test be order. Patient states she is traveling internationally next month and plans to have it completed at a [facility] lab.

## 2022-05-13 ENCOUNTER — HOSPITAL ENCOUNTER (OUTPATIENT)
Dept: LAB | Facility: MEDICAL CENTER | Age: 37
End: 2022-05-13
Attending: FAMILY MEDICINE
Payer: COMMERCIAL

## 2022-05-13 DIAGNOSIS — Z11.52 ENCOUNTER FOR SCREENING FOR COVID-19: ICD-10-CM

## 2022-05-13 LAB — COVID ORDER STATUS COVID19: NORMAL

## 2022-05-13 PROCEDURE — U0003 INFECTIOUS AGENT DETECTION BY NUCLEIC ACID (DNA OR RNA); SEVERE ACUTE RESPIRATORY SYNDROME CORONAVIRUS 2 (SARS-COV-2) (CORONAVIRUS DISEASE [COVID-19]), AMPLIFIED PROBE TECHNIQUE, MAKING USE OF HIGH THROUGHPUT TECHNOLOGIES AS DESCRIBED BY CMS-2020-01-R: HCPCS

## 2022-05-13 PROCEDURE — C9803 HOPD COVID-19 SPEC COLLECT: HCPCS

## 2022-05-13 PROCEDURE — U0005 INFEC AGEN DETEC AMPLI PROBE: HCPCS

## 2022-05-14 LAB
SARS-COV-2 RNA RESP QL NAA+PROBE: NOTDETECTED
SPECIMEN SOURCE: NORMAL

## 2022-06-28 ENCOUNTER — HOSPITAL ENCOUNTER (OUTPATIENT)
Dept: LAB | Facility: MEDICAL CENTER | Age: 37
End: 2022-06-28
Attending: FAMILY MEDICINE
Payer: COMMERCIAL

## 2022-06-28 DIAGNOSIS — Z13.220 SCREENING, LIPID: ICD-10-CM

## 2022-06-28 DIAGNOSIS — Z01.419 WELL WOMAN EXAM WITH ROUTINE GYNECOLOGICAL EXAM: ICD-10-CM

## 2022-06-28 DIAGNOSIS — E55.9 VITAMIN D INSUFFICIENCY: ICD-10-CM

## 2022-06-28 DIAGNOSIS — Z13.0 SCREENING FOR DEFICIENCY ANEMIA: ICD-10-CM

## 2022-06-28 DIAGNOSIS — R73.01 IMPAIRED FASTING GLUCOSE: ICD-10-CM

## 2022-06-28 LAB
25(OH)D3 SERPL-MCNC: 23 NG/ML (ref 30–100)
ALBUMIN SERPL BCP-MCNC: 4.4 G/DL (ref 3.2–4.9)
ALBUMIN/GLOB SERPL: 1.4 G/DL
ALP SERPL-CCNC: 73 U/L (ref 30–99)
ALT SERPL-CCNC: 12 U/L (ref 2–50)
ANION GAP SERPL CALC-SCNC: 12 MMOL/L (ref 7–16)
AST SERPL-CCNC: 16 U/L (ref 12–45)
BASOPHILS # BLD AUTO: 0.6 % (ref 0–1.8)
BASOPHILS # BLD: 0.05 K/UL (ref 0–0.12)
BILIRUB SERPL-MCNC: 0.3 MG/DL (ref 0.1–1.5)
BUN SERPL-MCNC: 7 MG/DL (ref 8–22)
CALCIUM SERPL-MCNC: 9 MG/DL (ref 8.5–10.5)
CHLORIDE SERPL-SCNC: 103 MMOL/L (ref 96–112)
CHOLEST SERPL-MCNC: 177 MG/DL (ref 100–199)
CO2 SERPL-SCNC: 21 MMOL/L (ref 20–33)
CREAT SERPL-MCNC: 0.73 MG/DL (ref 0.5–1.4)
EOSINOPHIL # BLD AUTO: 0.17 K/UL (ref 0–0.51)
EOSINOPHIL NFR BLD: 2 % (ref 0–6.9)
ERYTHROCYTE [DISTWIDTH] IN BLOOD BY AUTOMATED COUNT: 41 FL (ref 35.9–50)
EST. AVERAGE GLUCOSE BLD GHB EST-MCNC: 111 MG/DL
FASTING STATUS PATIENT QL REPORTED: NORMAL
GFR SERPLBLD CREATININE-BSD FMLA CKD-EPI: 110 ML/MIN/1.73 M 2
GLOBULIN SER CALC-MCNC: 3.1 G/DL (ref 1.9–3.5)
GLUCOSE SERPL-MCNC: 81 MG/DL (ref 65–99)
HBA1C MFR BLD: 5.5 % (ref 4–5.6)
HCT VFR BLD AUTO: 46.7 % (ref 37–47)
HDLC SERPL-MCNC: 40 MG/DL
HGB BLD-MCNC: 14.7 G/DL (ref 12–16)
IMM GRANULOCYTES # BLD AUTO: 0.02 K/UL (ref 0–0.11)
IMM GRANULOCYTES NFR BLD AUTO: 0.2 % (ref 0–0.9)
LDLC SERPL CALC-MCNC: 111 MG/DL
LYMPHOCYTES # BLD AUTO: 2.29 K/UL (ref 1–4.8)
LYMPHOCYTES NFR BLD: 27.3 % (ref 22–41)
MCH RBC QN AUTO: 26.4 PG (ref 27–33)
MCHC RBC AUTO-ENTMCNC: 31.5 G/DL (ref 33.6–35)
MCV RBC AUTO: 83.8 FL (ref 81.4–97.8)
MONOCYTES # BLD AUTO: 0.45 K/UL (ref 0–0.85)
MONOCYTES NFR BLD AUTO: 5.4 % (ref 0–13.4)
NEUTROPHILS # BLD AUTO: 5.42 K/UL (ref 2–7.15)
NEUTROPHILS NFR BLD: 64.5 % (ref 44–72)
NRBC # BLD AUTO: 0 K/UL
NRBC BLD-RTO: 0 /100 WBC
PLATELET # BLD AUTO: 233 K/UL (ref 164–446)
PMV BLD AUTO: 14.1 FL (ref 9–12.9)
POTASSIUM SERPL-SCNC: 4.3 MMOL/L (ref 3.6–5.5)
PROT SERPL-MCNC: 7.5 G/DL (ref 6–8.2)
RBC # BLD AUTO: 5.57 M/UL (ref 4.2–5.4)
SODIUM SERPL-SCNC: 136 MMOL/L (ref 135–145)
TRIGL SERPL-MCNC: 129 MG/DL (ref 0–149)
WBC # BLD AUTO: 8.4 K/UL (ref 4.8–10.8)

## 2022-06-28 PROCEDURE — 80053 COMPREHEN METABOLIC PANEL: CPT

## 2022-06-28 PROCEDURE — 82306 VITAMIN D 25 HYDROXY: CPT

## 2022-06-28 PROCEDURE — 85025 COMPLETE CBC W/AUTO DIFF WBC: CPT

## 2022-06-28 PROCEDURE — 80061 LIPID PANEL: CPT

## 2022-06-28 PROCEDURE — 83036 HEMOGLOBIN GLYCOSYLATED A1C: CPT

## 2022-06-28 PROCEDURE — 36415 COLL VENOUS BLD VENIPUNCTURE: CPT

## 2022-06-30 SDOH — ECONOMIC STABILITY: TRANSPORTATION INSECURITY
IN THE PAST 12 MONTHS, HAS THE LACK OF TRANSPORTATION KEPT YOU FROM MEDICAL APPOINTMENTS OR FROM GETTING MEDICATIONS?: NO

## 2022-06-30 SDOH — ECONOMIC STABILITY: HOUSING INSECURITY
IN THE LAST 12 MONTHS, WAS THERE A TIME WHEN YOU DID NOT HAVE A STEADY PLACE TO SLEEP OR SLEPT IN A SHELTER (INCLUDING NOW)?: NO

## 2022-06-30 SDOH — ECONOMIC STABILITY: INCOME INSECURITY: IN THE LAST 12 MONTHS, WAS THERE A TIME WHEN YOU WERE NOT ABLE TO PAY THE MORTGAGE OR RENT ON TIME?: NO

## 2022-06-30 SDOH — HEALTH STABILITY: MENTAL HEALTH
STRESS IS WHEN SOMEONE FEELS TENSE, NERVOUS, ANXIOUS, OR CAN'T SLEEP AT NIGHT BECAUSE THEIR MIND IS TROUBLED. HOW STRESSED ARE YOU?: RATHER MUCH

## 2022-06-30 SDOH — ECONOMIC STABILITY: TRANSPORTATION INSECURITY
IN THE PAST 12 MONTHS, HAS LACK OF RELIABLE TRANSPORTATION KEPT YOU FROM MEDICAL APPOINTMENTS, MEETINGS, WORK OR FROM GETTING THINGS NEEDED FOR DAILY LIVING?: NO

## 2022-06-30 SDOH — ECONOMIC STABILITY: TRANSPORTATION INSECURITY
IN THE PAST 12 MONTHS, HAS LACK OF TRANSPORTATION KEPT YOU FROM MEETINGS, WORK, OR FROM GETTING THINGS NEEDED FOR DAILY LIVING?: NO

## 2022-06-30 SDOH — ECONOMIC STABILITY: HOUSING INSECURITY

## 2022-06-30 SDOH — HEALTH STABILITY: PHYSICAL HEALTH: ON AVERAGE, HOW MANY MINUTES DO YOU ENGAGE IN EXERCISE AT THIS LEVEL?: 0 MIN

## 2022-06-30 SDOH — ECONOMIC STABILITY: INCOME INSECURITY: HOW HARD IS IT FOR YOU TO PAY FOR THE VERY BASICS LIKE FOOD, HOUSING, MEDICAL CARE, AND HEATING?: SOMEWHAT HARD

## 2022-06-30 SDOH — ECONOMIC STABILITY: FOOD INSECURITY: WITHIN THE PAST 12 MONTHS, THE FOOD YOU BOUGHT JUST DIDN'T LAST AND YOU DIDN'T HAVE MONEY TO GET MORE.: NEVER TRUE

## 2022-06-30 SDOH — ECONOMIC STABILITY: FOOD INSECURITY: WITHIN THE PAST 12 MONTHS, YOU WORRIED THAT YOUR FOOD WOULD RUN OUT BEFORE YOU GOT MONEY TO BUY MORE.: NEVER TRUE

## 2022-06-30 SDOH — HEALTH STABILITY: PHYSICAL HEALTH: ON AVERAGE, HOW MANY DAYS PER WEEK DO YOU ENGAGE IN MODERATE TO STRENUOUS EXERCISE (LIKE A BRISK WALK)?: 0 DAYS

## 2022-06-30 ASSESSMENT — SOCIAL DETERMINANTS OF HEALTH (SDOH)
HOW HARD IS IT FOR YOU TO PAY FOR THE VERY BASICS LIKE FOOD, HOUSING, MEDICAL CARE, AND HEATING?: SOMEWHAT HARD
HOW OFTEN DO YOU GET TOGETHER WITH FRIENDS OR RELATIVES?: ONCE A WEEK
HOW OFTEN DO YOU ATTEND CHURCH OR RELIGIOUS SERVICES?: MORE THAN 4 TIMES PER YEAR
HOW MANY DRINKS CONTAINING ALCOHOL DO YOU HAVE ON A TYPICAL DAY WHEN YOU ARE DRINKING: 1 OR 2
HOW OFTEN DO YOU ATTENT MEETINGS OF THE CLUB OR ORGANIZATION YOU BELONG TO?: NEVER
HOW OFTEN DO YOU ATTENT MEETINGS OF THE CLUB OR ORGANIZATION YOU BELONG TO?: NEVER
HOW OFTEN DO YOU GET TOGETHER WITH FRIENDS OR RELATIVES?: ONCE A WEEK
DO YOU BELONG TO ANY CLUBS OR ORGANIZATIONS SUCH AS CHURCH GROUPS UNIONS, FRATERNAL OR ATHLETIC GROUPS, OR SCHOOL GROUPS?: NO
WITHIN THE PAST 12 MONTHS, YOU WORRIED THAT YOUR FOOD WOULD RUN OUT BEFORE YOU GOT THE MONEY TO BUY MORE: NEVER TRUE
DO YOU BELONG TO ANY CLUBS OR ORGANIZATIONS SUCH AS CHURCH GROUPS UNIONS, FRATERNAL OR ATHLETIC GROUPS, OR SCHOOL GROUPS?: NO
IN A TYPICAL WEEK, HOW MANY TIMES DO YOU TALK ON THE PHONE WITH FAMILY, FRIENDS, OR NEIGHBORS?: TWICE A WEEK
HOW OFTEN DO YOU ATTEND CHURCH OR RELIGIOUS SERVICES?: MORE THAN 4 TIMES PER YEAR
IN A TYPICAL WEEK, HOW MANY TIMES DO YOU TALK ON THE PHONE WITH FAMILY, FRIENDS, OR NEIGHBORS?: TWICE A WEEK
HOW OFTEN DO YOU HAVE SIX OR MORE DRINKS ON ONE OCCASION: NEVER
HOW OFTEN DO YOU HAVE A DRINK CONTAINING ALCOHOL: 2-4 TIMES A MONTH

## 2022-06-30 ASSESSMENT — LIFESTYLE VARIABLES
HOW MANY STANDARD DRINKS CONTAINING ALCOHOL DO YOU HAVE ON A TYPICAL DAY: 1 OR 2
AUDIT-C TOTAL SCORE: 2
SKIP TO QUESTIONS 9-10: 1
HOW OFTEN DO YOU HAVE A DRINK CONTAINING ALCOHOL: 2-4 TIMES A MONTH
HOW OFTEN DO YOU HAVE SIX OR MORE DRINKS ON ONE OCCASION: NEVER

## 2022-07-01 ENCOUNTER — OFFICE VISIT (OUTPATIENT)
Dept: MEDICAL GROUP | Facility: IMAGING CENTER | Age: 37
End: 2022-07-01
Payer: COMMERCIAL

## 2022-07-01 VITALS
DIASTOLIC BLOOD PRESSURE: 60 MMHG | HEART RATE: 71 BPM | BODY MASS INDEX: 31.77 KG/M2 | HEIGHT: 60 IN | OXYGEN SATURATION: 99 % | WEIGHT: 161.8 LBS | RESPIRATION RATE: 14 BRPM | TEMPERATURE: 98.3 F | SYSTOLIC BLOOD PRESSURE: 108 MMHG

## 2022-07-01 DIAGNOSIS — R45.89 DEPRESSED MOOD: ICD-10-CM

## 2022-07-01 DIAGNOSIS — R19.5 LOOSE STOOLS: ICD-10-CM

## 2022-07-01 DIAGNOSIS — Z00.00 WELL ADULT EXAM: ICD-10-CM

## 2022-07-01 DIAGNOSIS — G47.33 OBSTRUCTIVE SLEEP APNEA SYNDROME: ICD-10-CM

## 2022-07-01 DIAGNOSIS — R53.82 CHRONIC FATIGUE: ICD-10-CM

## 2022-07-01 DIAGNOSIS — E28.2 PCOS (POLYCYSTIC OVARIAN SYNDROME): ICD-10-CM

## 2022-07-01 DIAGNOSIS — E66.9 OBESITY (BMI 30-39.9): ICD-10-CM

## 2022-07-01 DIAGNOSIS — E55.9 VITAMIN D INSUFFICIENCY: ICD-10-CM

## 2022-07-01 DIAGNOSIS — E03.9 HYPOTHYROIDISM (ACQUIRED): ICD-10-CM

## 2022-07-01 DIAGNOSIS — E78.00 ELEVATED LDL CHOLESTEROL LEVEL: ICD-10-CM

## 2022-07-01 DIAGNOSIS — F41.9 ANXIETY: ICD-10-CM

## 2022-07-01 PROBLEM — E06.3 HYPOTHYROIDISM DUE TO HASHIMOTO'S THYROIDITIS: Status: ACTIVE | Noted: 2021-04-28

## 2022-07-01 PROBLEM — E03.8 HYPOTHYROIDISM DUE TO HASHIMOTO'S THYROIDITIS: Status: ACTIVE | Noted: 2021-04-28

## 2022-07-01 PROCEDURE — 99395 PREV VISIT EST AGE 18-39: CPT | Performed by: FAMILY MEDICINE

## 2022-07-01 ASSESSMENT — PAIN SCALES - GENERAL: PAINLEVEL: NO PAIN

## 2022-07-01 ASSESSMENT — PATIENT HEALTH QUESTIONNAIRE - PHQ9: CLINICAL INTERPRETATION OF PHQ2 SCORE: 0

## 2022-07-01 ASSESSMENT — FIBROSIS 4 INDEX: FIB4 SCORE: 0.69

## 2022-07-01 NOTE — PROGRESS NOTES
Chief Complaint   Patient presents with   • Annual Exam       HPI:  37 y.o. female for well exam.     Hypothyroidism- stable labs.   Levothyroxine 75 mg, one day a week takes an extra 1/2 tab.     Vitamin D insufficiency - taking supplements, 2000 IU but not ever day.     LDL elevated- mild.     PCOS-she has been taking metformin twice a week and not daily.     Chronic fatigue.   Goes to bed late at night.  Not rested.   Has a toddler. Midday feels tired.   Coffee.   Had home sleep test.   Sleep apnea- did not get machine due to cost.     States she was out of country last few weeks.   If she ate spicy food, had a couple bowel movements- diarrhea type. Continues to have issues since returning to [place]. More so loose stools, but can be with certain foods, like beer, spicy, etc.     Has not seen counseling yet for anxiety and depressed mood. Prior referral placed back in 2020.  Otherwise stable.       Lifestyle:  Diet: rice in evening, breakfast bread/smoothies.   Exercise/Activities: not regular- time/energy.   Stressors: feels stressed  Social Support: good  Work: not working, has child and going to school.     Health Maintenance  Last pap: up to date  Immunization: up to date.   There are no preventive care reminders to display for this patient.    Immunization History   Administered Date(s) Administered   • HPV Quadrivalent Vaccine (GARDASIL) - HISTORICAL DATA 11/20/2013   • INFLUENZA TIV (IM) 01/25/2013, 10/21/2013   • Influenza (IM) Preservative Free - HISTORICAL DATA 10/07/2019   • Influenza Seasonal Injectable - Historical Data 10/21/2013, 11/08/2014   • Influenza Vaccine Pediatric Split - Historical Data 10/21/2013, 11/08/2014   • Influenza Vaccine Quad Inj (Pf) 10/06/2017, 10/23/2018, 11/01/2020   • Influenza Vaccine Quad Inj (Preserved) 10/18/2016   • MMR Vaccine 11/13/2012, 12/13/2012   • MODERNA SARS-COV-2 VACCINE (12+) 04/14/2021, 05/12/2021, 01/03/2022   • Tdap Vaccine 10/07/2019   • Tuberculin Skin  Test 2011         Review of Systems   Constitutional: Negative for fever, chills and malaise.  HENT: Negative for congestion, sore throat, or swallowing issues.   Eyes: Negative for pain or vision changes.   Respiratory: Negative for cough and shortness of breath.    Cardiovascular: Negative for leg swelling. No chest pain.   Gastrointestinal: Negative for nausea, vomiting, and abdominal pain. As above.   Genitourinary: Negative for dysuria and hematuria.   Skin: Negative for rash.   Neurological: Negative for dizziness, focal weakness and headaches.   Endo/Heme/Allergies: Does not bruise/bleed easily.   Psychiatric/Behavioral: Negative for depression.  The patient is not nervous/anxious.          Current Outpatient Medications:   •  levothyroxine (SYNTHROID) 75 MCG Tab, , Disp: , Rfl:   •  metFORMIN (GLUCOPHAGE) 500 MG Tab, TAKE 1 TABLET BY MOUTH TWICE A DAY WITH MEALS, Disp: 180 Tab, Rfl: 1  •  Cholecalciferol (VITAMIN D) 2000 UNIT Tab, Take  by mouth every day., Disp: , Rfl:   •  Prenatal Multivit-Min-Fe-FA (PRENATAL VITAMINS PO), Take  by mouth. (Patient not taking: Reported on 2022), Disp: , Rfl:     No Known Allergies    Patient Active Problem List   Diagnosis   • Positive PPD   • Family planning   • Psychologic vaginismus   • Hypothyroidism (acquired)   • Acne vulgaris   • Meralgia paresthetica of right side   • Generalized anxiety disorder   • PCOS (polycystic ovarian syndrome)   • Chronic fatigue   • Slow transit constipation   • Normocytic anemia   • Vitamin D insufficiency   • Lactation problem   • Lactation suppression   • History of hypothyroidism   • Impaired fasting glucose   • Hypothyroidism due to Hashimoto's thyroiditis       Past Medical History:   Diagnosis Date   • Anxiety    • PCOS (polycystic ovarian syndrome)    • Positive PPD    • Thyroid disease        Past Surgical History:   Procedure Laterality Date   • [place]  DELIVERY ONLY N/A 2019    Procedure:  SECTION,  PRIMARY;  Surgeon: Nadira Abrams M.D.;  Location: [facility] DELIVERY;  Service: Labor and Delivery       Family History   Problem Relation Age of Onset   • Arthritis Mother    • Thyroid Mother         hypothyroid   • Hypertension Father    • Arthritis Maternal Grandmother    • Hypertension Paternal Grandmother        Social History     Tobacco Use   • Smoking status: Never Smoker   • Smokeless tobacco: Never Used   Vaping Use   • Vaping Use: Never used   Substance Use Topics   • Alcohol use: Yes     Alcohol/week: 0.6 oz     Types: 1 Glasses of wine per week     Comment: infrequent, drinks 2-3 times a month   • Drug use: No         PHYSICAL EXAM:  /60   Pulse 71   Temp 36.8 °C (98.3 °F)   Resp 14   Ht 1.524 m (5')   Wt 73.4 kg (161 lb 12.8 oz)   SpO2 99%   BMI 31.60 kg/m²   Constitutional: She appears well-developed and well-nourished. She appears not diaphoretic. No distress.   HENT: Right Ear: External ear normal. Left Ear: External ear normal. Tympanic membranes clear and intact.   Nose: Nose normal.   Mouth/Throat: Oropharynx is clear and moist. No oropharyngeal exudate.     Eyes: Conjunctivae and extraocular motions are normal. Pupils are equal, round, and reactive to light. No scleral icterus.   Neck: Normal range of motion. Neck supple. No thyromegaly present.   Cardiovascular: Normal rate, regular rhythm, normal heart sounds and intact distal pulses.  Exam reveals no gallop and no friction rub.  No murmur heard.   Pulmonary/Chest: Effort normal and breath sounds normal. No respiratory distress. She has no wheezes. She has no rales.   Abdominal: Soft. Bowel sounds are normal. She exhibits no distension and no mass. No tenderness. She has no rebound and no guarding.   Lymphadenopathy:  She has no cervical adenopathy.   Neurological: She is alert. She has normal reflexes. No cranial nerve deficit. She exhibits normal muscle tone.   Skin: Skin is warm and dry. No rash noted. She is not diaphoretic.  No erythema.   Psychiatric: She has a normal mood and affect. Her behavior is normal.   Musculoskeletal: She exhibits no edema. Full strength throughout. 2+ DTR throughout.        Latest Reference Range & Units 06/28/22 10:25   WBC 4.8 - 10.8 K/uL 8.4   RBC 4.20 - 5.40 M/uL 5.57 (H)   Hemoglobin 12.0 - 16.0 g/dL 14.7   Hematocrit 37.0 - 47.0 % 46.7   MCV 81.4 - 97.8 fL 83.8   MCH 27.0 - 33.0 pg 26.4 (L)   MCHC 33.6 - 35.0 g/dL 31.5 (L)   RDW 35.9 - 50.0 fL 41.0   Platelet Count 164 - 446 K/uL 233   MPV 9.0 - 12.9 fL 14.1 (H)   Neutrophils-Polys 44.00 - 72.00 % 64.50   Neutrophils (Absolute) 2.00 - 7.15 K/uL 5.42   Lymphocytes 22.00 - 41.00 % 27.30   Lymphs (Absolute) 1.00 - 4.80 K/uL 2.29   Monocytes 0.00 - 13.40 % 5.40   Monos (Absolute) 0.00 - 0.85 K/uL 0.45   Eosinophils 0.00 - 6.90 % 2.00   Eos (Absolute) 0.00 - 0.51 K/uL 0.17   Basophils 0.00 - 1.80 % 0.60   Baso (Absolute) 0.00 - 0.12 K/uL 0.05   Immature Granulocytes 0.00 - 0.90 % 0.20   Immature Granulocytes (abs) 0.00 - 0.11 K/uL 0.02   Nucleated RBC /100 WBC 0.00   NRBC (Absolute) K/uL 0.00   Sodium 135 - 145 mmol/L 136   Potassium 3.6 - 5.5 mmol/L 4.3   Chloride 96 - 112 mmol/L 103   Co2 20 - 33 mmol/L 21   Anion Gap 7.0 - 16.0  12.0   Glucose 65 - 99 mg/dL 81   Bun 8 - 22 mg/dL 7 (L)   Creatinine 0.50 - 1.40 mg/dL 0.73   GFR (CKD-EPI) >60 mL/min/1.73 m 2 110   Calcium 8.5 - 10.5 mg/dL 9.0   AST(SGOT) 12 - 45 U/L 16   ALT(SGPT) 2 - 50 U/L 12   Alkaline Phosphatase 30 - 99 U/L 73   Total Bilirubin 0.1 - 1.5 mg/dL 0.3   Albumin 3.2 - 4.9 g/dL 4.4   Total Protein 6.0 - 8.2 g/dL 7.5   Globulin 1.9 - 3.5 g/dL 3.1   A-G Ratio g/dL 1.4   Glycohemoglobin 4.0 - 5.6 % 5.5   Estim. Avg Glu mg/dL 111   Fasting Status  Fasting   Cholesterol,Tot 100 - 199 mg/dL 177   Triglycerides 0 - 149 mg/dL 129   HDL >=40 mg/dL 40   LDL <100 mg/dL 111 (H)   25-Hydroxy   Vitamin D 25 30 - 100 ng/mL 23 (L)   (H): Data is abnormally high  (L): Data is abnormally  low      ASSESSMENT/PLAN:    This is a 37 y.o. female for well exam.     1. Well adult exam   Recommend healthy diet and routine exercise.     2. Hypothyroidism (acquired) - stable, continue current medication.  Monitor in future.     3. Vitamin D insufficiency   Recommend take vitamin D 3, 2000 IU daily. Monitor labs, recheck in 6-8 weeks.  VITAMIN D,25 HYDROXY   4. Elevated LDL cholesterol level - mild.   Recommend healthy diet and routine exercise.     5. PCOS (polycystic ovarian syndrome) - has been taking metformin twice weekly.   Recommend regular intake of medication. Monitor.     6. Chronic fatigue - reviewed likely due to sleep apnea and recommend obtain cpap. H/H and TSH otherwise within normal range. Monitor.     7. Obstructive sleep apnea syndrome - due to cost, patient did not start cpap.   Advised due to severity, recommend obtain cpap. Will resend order.   Follow up in 6-8 weeks after obtaining cpap.     8. Loose stools - had prior travel out of country.   Appears symptoms are related to certain food intake.   Recommend bulking fiber and prime tea. Continue to monitor at this time.     9. Anxiety -stable, has not started counseling therapy.   Recommend consider online resources for therapy, resource given.     10. Depressed mood -stable, as above.     11. Obesity (BMI 30-39.9)   Patient's body mass index is 31.6 kg/m². Exercise and nutrition counseling were performed at this visit.        Follow up in 2-3 months, sooner as needed.     This medical record contains text that has been entered with the assistance of computer voice recognition and dictation software.  Therefore, it may contain unintended errors in text, spelling, punctuation, or grammar.

## 2022-07-06 ENCOUNTER — TELEPHONE (OUTPATIENT)
Dept: MEDICAL GROUP | Facility: IMAGING CENTER | Age: 37
End: 2022-07-06
Payer: COMMERCIAL

## 2022-07-06 NOTE — TELEPHONE ENCOUNTER
----- Message from Kylie Lamb M.D. sent at 7/5/2022 10:46 AM PDT -----  Please resend order for cpap and document it was sent.

## 2022-09-10 ENCOUNTER — HOSPITAL ENCOUNTER (OUTPATIENT)
Dept: LAB | Facility: MEDICAL CENTER | Age: 37
End: 2022-09-10
Attending: PHYSICIAN ASSISTANT
Payer: COMMERCIAL

## 2022-09-10 LAB
T4 FREE SERPL-MCNC: 1.46 NG/DL (ref 0.93–1.7)
TSH SERPL DL<=0.005 MIU/L-ACNC: 1.22 UIU/ML (ref 0.38–5.33)

## 2022-09-10 PROCEDURE — 36415 COLL VENOUS BLD VENIPUNCTURE: CPT

## 2022-09-10 PROCEDURE — 84439 ASSAY OF FREE THYROXINE: CPT

## 2022-09-10 PROCEDURE — 84443 ASSAY THYROID STIM HORMONE: CPT

## 2022-10-10 ENCOUNTER — HOSPITAL ENCOUNTER (OUTPATIENT)
Dept: LAB | Facility: MEDICAL CENTER | Age: 37
End: 2022-10-10
Attending: FAMILY MEDICINE
Payer: COMMERCIAL

## 2022-10-10 DIAGNOSIS — E55.9 VITAMIN D INSUFFICIENCY: ICD-10-CM

## 2022-10-10 LAB — 25(OH)D3 SERPL-MCNC: 22 NG/ML (ref 30–100)

## 2022-10-10 PROCEDURE — 82306 VITAMIN D 25 HYDROXY: CPT

## 2022-10-10 PROCEDURE — 36415 COLL VENOUS BLD VENIPUNCTURE: CPT

## 2022-10-13 ENCOUNTER — OFFICE VISIT (OUTPATIENT)
Dept: MEDICAL GROUP | Facility: IMAGING CENTER | Age: 37
End: 2022-10-13
Payer: COMMERCIAL

## 2022-10-13 VITALS
DIASTOLIC BLOOD PRESSURE: 78 MMHG | SYSTOLIC BLOOD PRESSURE: 102 MMHG | TEMPERATURE: 97.3 F | OXYGEN SATURATION: 97 % | BODY MASS INDEX: 30.98 KG/M2 | WEIGHT: 157.8 LBS | HEART RATE: 87 BPM | HEIGHT: 60 IN | RESPIRATION RATE: 17 BRPM

## 2022-10-13 DIAGNOSIS — F32.A ANXIETY AND DEPRESSION: ICD-10-CM

## 2022-10-13 DIAGNOSIS — G47.33 OSA (OBSTRUCTIVE SLEEP APNEA): ICD-10-CM

## 2022-10-13 DIAGNOSIS — M54.50 CHRONIC BILATERAL LOW BACK PAIN, UNSPECIFIED WHETHER SCIATICA PRESENT: ICD-10-CM

## 2022-10-13 DIAGNOSIS — F41.9 ANXIETY AND DEPRESSION: ICD-10-CM

## 2022-10-13 DIAGNOSIS — G47.9 SLEEP DIFFICULTIES: ICD-10-CM

## 2022-10-13 DIAGNOSIS — G89.29 CHRONIC BILATERAL LOW BACK PAIN, UNSPECIFIED WHETHER SCIATICA PRESENT: ICD-10-CM

## 2022-10-13 DIAGNOSIS — E55.9 VITAMIN D INSUFFICIENCY: ICD-10-CM

## 2022-10-13 DIAGNOSIS — E28.2 PCOS (POLYCYSTIC OVARIAN SYNDROME): ICD-10-CM

## 2022-10-13 DIAGNOSIS — R53.82 CHRONIC FATIGUE: ICD-10-CM

## 2022-10-13 DIAGNOSIS — Z13.79 GENETIC SCREENING: ICD-10-CM

## 2022-10-13 PROCEDURE — 99214 OFFICE O/P EST MOD 30 MIN: CPT | Performed by: FAMILY MEDICINE

## 2022-10-13 RX ORDER — SERTRALINE HYDROCHLORIDE 25 MG/1
25 TABLET, FILM COATED ORAL DAILY
Qty: 30 TABLET | Refills: 3 | Status: SHIPPED | OUTPATIENT
Start: 2022-10-13 | End: 2022-11-14

## 2022-10-13 ASSESSMENT — FIBROSIS 4 INDEX: FIB4 SCORE: 0.69

## 2022-10-13 ASSESSMENT — PAIN SCALES - GENERAL: PAINLEVEL: NO PAIN

## 2022-10-13 NOTE — PROGRESS NOTES
SUBJECTIVE:    Chief Complaint   Patient presents with    Back Pain     Started after birth 3 years ago. Past couple of months it has gotten worse especially when she is on her menstrua cycle.     Other     Sleep study test results  CPAP     Anxiety    Depression    Referral Needed     Genetic screening        HPI:     Milli Santizo is a 37 y.o. female with chronic fatigue, anxiety/depression, hypothyroidism and SHIMON.   Chronic fatigue-diagnosed with shimon.   SHIMON- has cpap, has had trouble with it. Does not feel doing well on it.   Continues to have sleep issues.   Used since September. First week was okay.   Used nasal pillow mask, first few days felt slept better. 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   Then full face mask, felt more claustrophobic.   Feels frustrated.   Studying at night time.   Barely slept 4 hours before.   Now wakes up early- 1-2 hours on mask.     Anxiety/depression- notes more so 2-3 weeks prior to menstrual period.   Seeing therapist. No current active SI/HI. Wondering about medication therapy.     PCOS- stable on metformin.  She is on levothyroxine 75 mcg daily.    Vitamin D insufficiency takes vitamin D and prenatal vitamin 3-4 times a week.  Plans to be more consistent with taking supplements.    Hypothyroidism-patient is on levothyroxine 75 mcg daily.  9/2022 labs stable.    Notes she might have some low back pain discomfort during her menstrual period.  Sometimes notes with chores.  She does bend and stand a lot.  Pain is being a.  No numbness, tingling or weakness in lower extremities.  No bowel or bladder incontinence.    Patient desires further genetic testing for medical conditions.     ROS:  No recent fevers or chills. Notes occasional nausea or vomiting. No diarrhea. No chest pains or shortness of breath. No lower extremity edema.    Current Outpatient Medications on File Prior to Visit   Medication Sig Dispense Refill    levothyroxine (SYNTHROID) 75 MCG Tab       metFORMIN  (GLUCOPHAGE) 500 MG Tab TAKE 1 TABLET BY MOUTH TWICE A DAY WITH MEALS 180 Tab 1    Cholecalciferol (VITAMIN D) 2000 UNIT Tab Take  by mouth every day.       No current facility-administered medications on file prior to visit.       No Known Allergies    Patient Active Problem List    Diagnosis Date Noted    Impaired fasting glucose 04/28/2021    Hypothyroidism due to Hashimoto's thyroiditis 04/28/2021    History of hypothyroidism 09/23/2020    Lactation problem 01/24/2020    Lactation suppression 01/24/2020    Slow transit constipation 08/08/2019    Normocytic anemia 08/08/2019    Vitamin D insufficiency 08/08/2019    Chronic fatigue 04/23/2019    PCOS (polycystic ovarian syndrome) 04/26/2018    Meralgia paresthetica of right side 06/14/2017    Generalized anxiety disorder 06/14/2017    Hypothyroidism (acquired) 04/05/2017    Acne vulgaris 04/05/2017    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    Family planning 09/05/2013    Positive PPD        Past Medical History:   Diagnosis Date    Anxiety     PCOS (polycystic ovarian syndrome)     Positive PPD     Thyroid disease      Family History   Problem Relation Age of Onset    Arthritis Mother     Thyroid Mother         hypothyroid    Hypertension Father     Arthritis Maternal Grandmother     Hypertension Paternal Grandmother          OBJECTIVE:   /78 (BP Location: Left arm, Patient Position: Sitting, BP Cuff Size: Adult)   Pulse 87   Temp 36.3 °C (97.3 °F) (Temporal)   Resp 17   Ht 1.524 m (5')   Wt 71.6 kg (157 lb 12.8 oz)   SpO2 97%   BMI 30.82 kg/m²   General: Well-developed well-nourished female, no acute distress  Neck: supple, no lymphadenopathy- cervical or supraclavicular, no thyromegaly  Cardiovascular: regular rate and rhythm, no murmurs, gallops, rubs  Lungs: clear to auscultation bilaterally, no wheezes, crackles, or rhonchi  Abdomen: +bowel sounds, soft, nontender, nondistended, no rebound, no guarding, no hepatosplenomegaly  Extremities: no  cyanosis, clubbing, edema  Back: Without spinal TTP, discomfort mostly in the lower lumbar area bilaterally.  Skin: Warm and dry  Psych: appears fatigued and slight flattened mood        ASSESSMENT/PLAN:    37 y.o.female with chronic fatigue, SHIMON, hypothyroidism, and anxiety/depression.      1. Chronic fatigue-component due to SHIMON.   Referral to Pulmonary and Sleep Medicine      2. SHIMON (obstructive sleep apnea) -Has not tolerated current CPAP use.  Will refer to pulmonary specialist/sleep medicine. Referral to Pulmonary and Sleep Medicine      3. Sleep difficulties -as above.       4. Anxiety and depression   -Discussed options of therapy.  Will start on sertraline 25 mg daily.  Reviewed potential risks and side effects of medication.  Monitor.  Follow-up in 2 weeks. sertraline (ZOLOFT) 25 MG tablet      5. PCOS (polycystic ovarian syndrome) -table.  Continue current medication. metFORMIN (GLUCOPHAGE) 500 MG Tab      6. Vitamin D insufficiency  Recommend more consistent intake of vitamin D.  Labs in future.       7. Chronic bilateral low back pain, unspecified whether sciatica present   D.  Patient would benefit from routine stretching and strengthening activities.  Consider regular yoga exercise activities as tolerated  Assess with imaging. DX-LUMBAR SPINE-2 OR 3 VIEWS      8. Genetic screening Referral to Genetic Studies        Return in about 2 weeks (around 10/27/2022).    This medical record contains text that has been entered with the assistance of computer voice recognition and dictation software.  Therefore, it may contain unintended errors in text, spelling, punctuation, or grammar.

## 2022-10-18 PROBLEM — G47.33 OSA (OBSTRUCTIVE SLEEP APNEA): Status: ACTIVE | Noted: 2022-10-18

## 2022-12-16 ENCOUNTER — HOSPITAL ENCOUNTER (OUTPATIENT)
Dept: LAB | Facility: MEDICAL CENTER | Age: 37
End: 2022-12-16
Attending: PHYSICIAN ASSISTANT
Payer: COMMERCIAL

## 2022-12-16 ENCOUNTER — HOSPITAL ENCOUNTER (OUTPATIENT)
Dept: RADIOLOGY | Facility: MEDICAL CENTER | Age: 37
End: 2022-12-16
Attending: PHYSICIAN ASSISTANT
Payer: COMMERCIAL

## 2022-12-16 ENCOUNTER — OFFICE VISIT (OUTPATIENT)
Dept: URGENT CARE | Facility: PHYSICIAN GROUP | Age: 37
End: 2022-12-16
Payer: COMMERCIAL

## 2022-12-16 VITALS
TEMPERATURE: 98.2 F | RESPIRATION RATE: 16 BRPM | OXYGEN SATURATION: 100 % | HEIGHT: 61 IN | SYSTOLIC BLOOD PRESSURE: 122 MMHG | DIASTOLIC BLOOD PRESSURE: 70 MMHG | BODY MASS INDEX: 29.15 KG/M2 | HEART RATE: 92 BPM | WEIGHT: 154.4 LBS

## 2022-12-16 DIAGNOSIS — R05.2 SUBACUTE COUGH: ICD-10-CM

## 2022-12-16 LAB
ALBUMIN SERPL BCP-MCNC: 4.5 G/DL (ref 3.2–4.9)
ALBUMIN/GLOB SERPL: 1.3 G/DL
ALP SERPL-CCNC: 70 U/L (ref 30–99)
ALT SERPL-CCNC: 16 U/L (ref 2–50)
ANION GAP SERPL CALC-SCNC: 10 MMOL/L (ref 7–16)
AST SERPL-CCNC: 17 U/L (ref 12–45)
BILIRUB SERPL-MCNC: 0.3 MG/DL (ref 0.1–1.5)
BUN SERPL-MCNC: 11 MG/DL (ref 8–22)
CALCIUM ALBUM COR SERPL-MCNC: 9.2 MG/DL (ref 8.5–10.5)
CALCIUM SERPL-MCNC: 9.6 MG/DL (ref 8.5–10.5)
CHLORIDE SERPL-SCNC: 103 MMOL/L (ref 96–112)
CO2 SERPL-SCNC: 25 MMOL/L (ref 20–33)
CORTIS SERPL-MCNC: 11.5 UG/DL (ref 0–23)
CREAT SERPL-MCNC: 0.59 MG/DL (ref 0.5–1.4)
EST. AVERAGE GLUCOSE BLD GHB EST-MCNC: 114 MG/DL
GFR SERPLBLD CREATININE-BSD FMLA CKD-EPI: 120 ML/MIN/1.73 M 2
GLOBULIN SER CALC-MCNC: 3.5 G/DL (ref 1.9–3.5)
GLUCOSE SERPL-MCNC: 93 MG/DL (ref 65–99)
HBA1C MFR BLD: 5.6 % (ref 4–5.6)
POTASSIUM SERPL-SCNC: 4.1 MMOL/L (ref 3.6–5.5)
PROT SERPL-MCNC: 8 G/DL (ref 6–8.2)
SODIUM SERPL-SCNC: 138 MMOL/L (ref 135–145)
T4 FREE SERPL-MCNC: 1.33 NG/DL (ref 0.93–1.7)
TSH SERPL DL<=0.005 MIU/L-ACNC: 0.94 UIU/ML (ref 0.38–5.33)

## 2022-12-16 PROCEDURE — 84443 ASSAY THYROID STIM HORMONE: CPT

## 2022-12-16 PROCEDURE — 80053 COMPREHEN METABOLIC PANEL: CPT

## 2022-12-16 PROCEDURE — 71046 X-RAY EXAM CHEST 2 VIEWS: CPT

## 2022-12-16 PROCEDURE — 82533 TOTAL CORTISOL: CPT

## 2022-12-16 PROCEDURE — 84439 ASSAY OF FREE THYROXINE: CPT

## 2022-12-16 PROCEDURE — 99213 OFFICE O/P EST LOW 20 MIN: CPT | Performed by: PHYSICIAN ASSISTANT

## 2022-12-16 PROCEDURE — 83036 HEMOGLOBIN GLYCOSYLATED A1C: CPT

## 2022-12-16 PROCEDURE — 36415 COLL VENOUS BLD VENIPUNCTURE: CPT

## 2022-12-16 RX ORDER — ALBUTEROL SULFATE 90 UG/1
2 AEROSOL, METERED RESPIRATORY (INHALATION) EVERY 6 HOURS PRN
Qty: 8.5 G | Refills: 0 | Status: SHIPPED | OUTPATIENT
Start: 2022-12-16

## 2022-12-16 RX ORDER — BENZONATATE 100 MG/1
100 CAPSULE ORAL 3 TIMES DAILY PRN
Qty: 30 CAPSULE | Refills: 0 | Status: SHIPPED | OUTPATIENT
Start: 2022-12-16 | End: 2022-12-16

## 2022-12-16 RX ORDER — PREDNISONE 10 MG/1
40 TABLET ORAL DAILY
Qty: 20 TABLET | Refills: 0 | Status: SHIPPED | OUTPATIENT
Start: 2022-12-16 | End: 2022-12-21

## 2022-12-16 RX ORDER — PREDNISONE 10 MG/1
40 TABLET ORAL DAILY
Qty: 20 TABLET | Refills: 0 | Status: SHIPPED | OUTPATIENT
Start: 2022-12-16 | End: 2022-12-16

## 2022-12-16 RX ORDER — ALBUTEROL SULFATE 90 UG/1
2 AEROSOL, METERED RESPIRATORY (INHALATION) EVERY 6 HOURS PRN
Qty: 8.5 G | Refills: 0 | Status: SHIPPED | OUTPATIENT
Start: 2022-12-16 | End: 2022-12-16

## 2022-12-16 RX ORDER — BENZONATATE 100 MG/1
100 CAPSULE ORAL 3 TIMES DAILY PRN
Qty: 30 CAPSULE | Refills: 0 | Status: SHIPPED | OUTPATIENT
Start: 2022-12-16 | End: 2022-12-26

## 2022-12-16 ASSESSMENT — FIBROSIS 4 INDEX: FIB4 SCORE: 0.69

## 2022-12-16 ASSESSMENT — ENCOUNTER SYMPTOMS
CHILLS: 0
HEADACHES: 0
SHORTNESS OF BREATH: 0
SORE THROAT: 0
VOMITING: 0
EYE PAIN: 0
CONSTIPATION: 0
DIARRHEA: 0
MYALGIAS: 0
NAUSEA: 0
FEVER: 0
ABDOMINAL PAIN: 0
SPUTUM PRODUCTION: 1
COUGH: 1

## 2022-12-17 NOTE — PROGRESS NOTES
"Subjective:   Milli Santizo is a 37 y.o. female who presents for Cough (Ongoing since 10/31/22, dry and sometimes coughing up phlegm )      37-year-old female presents complaining of a 6-week history of cough and congestion.  Cough seems to be worse at night.  She does expectorate a small amount of sputum.  She denies any difficulty breathing, has not noted any fevers chills or rigors.  She is been trying DayQuil and NyQuil which does not seem to help.  She does not seem to be getting worse but she has not shown any improvement.  Initial symptoms started with nonspecific URI    Review of Systems   Constitutional:  Positive for malaise/fatigue. Negative for chills and fever.   HENT:  Positive for congestion. Negative for ear pain and sore throat.    Eyes:  Negative for pain.   Respiratory:  Positive for cough and sputum production. Negative for shortness of breath.    Cardiovascular:  Negative for chest pain.   Gastrointestinal:  Negative for abdominal pain, constipation, diarrhea, nausea and vomiting.   Genitourinary:  Negative for dysuria.   Musculoskeletal:  Negative for myalgias.   Skin:  Negative for rash.   Neurological:  Negative for headaches.     Medications, Allergies, and current problem list reviewed today in Epic.     Objective:     /70   Pulse 92   Temp 36.8 °C (98.2 °F) (Temporal)   Resp 16   Ht 1.549 m (5' 1\")   Wt 70 kg (154 lb 6.4 oz)   SpO2 100%     Physical Exam  Vitals reviewed.   Constitutional:       Appearance: Normal appearance.   HENT:      Head: Normocephalic and atraumatic.      Right Ear: Tympanic membrane, ear canal and external ear normal.      Left Ear: Tympanic membrane, ear canal and external ear normal.      Nose: Rhinorrhea present.      Mouth/Throat:      Mouth: Mucous membranes are moist.      Pharynx: Oropharynx is clear.   Eyes:      Conjunctiva/sclera: Conjunctivae normal.      Pupils: Pupils are equal, round, and reactive to light.   Cardiovascular:      Rate " and Rhythm: Normal rate and regular rhythm.   Pulmonary:      Effort: Pulmonary effort is normal.      Breath sounds: Normal breath sounds.   Musculoskeletal:      Cervical back: Normal range of motion.   Lymphadenopathy:      Cervical: No cervical adenopathy.   Skin:     General: Skin is warm and dry.      Capillary Refill: Capillary refill takes less than 2 seconds.   Neurological:      Mental Status: She is alert and oriented to person, place, and time.       RADIOLOGY RESULTS   DX-CHEST-2 VIEWS    Result Date: 12/16/2022   Chest radiographs, 2 views. 12/16/2022 12:37 PM HISTORY/REASON FOR EXAM:  Cough. COMPARISON:  4/4/11 FINDINGS: The lungs are clear. No consolidation, edema, effusion, or pneumothorax is noted. Cardiac silhouette is within normal limits in size and unremarkable. Osseous structures and soft tissues are unremarkable.     No acute cardiopulmonary process.           Assessment/Plan:     Diagnosis and associated orders:     1. Subacute cough  DX-CHEST-2 VIEWS    albuterol 108 (90 Base) MCG/ACT Aero Soln inhalation aerosol    benzonatate (TESSALON) 100 MG Cap    predniSONE (DELTASONE) 10 MG Tab    DISCONTINUED: predniSONE (DELTASONE) 10 MG Tab    DISCONTINUED: albuterol 108 (90 Base) MCG/ACT Aero Soln inhalation aerosol    DISCONTINUED: benzonatate (TESSALON) 100 MG Cap         Comments/MDM:     Recommend nasal steroid, nonsedating antihistamine.  Follow-up with primary.  No evidence of infectious or bacterial etiology or indication for antibiotics.  I would anticipate improvement over the coming 3 to 5 days the patient noted any worsening I recommend urgent reevaluation.  Low suspicion for embolism         Differential diagnosis, natural history, supportive care, and indications for immediate follow-up discussed.    Advised the patient to follow-up with the primary care physician for recheck, reevaluation, and consideration of further management.    Please note that this dictation was created using  voice recognition software. I have made a reasonable attempt to correct obvious errors, but I expect that there are errors of grammar and possibly content that I did not discover before finalizing the note.    This note was electronically signed by Otoniel Smith PA-C

## 2022-12-22 ENCOUNTER — OFFICE VISIT (OUTPATIENT)
Dept: MEDICAL GROUP | Facility: IMAGING CENTER | Age: 37
End: 2022-12-22
Payer: COMMERCIAL

## 2022-12-22 VITALS
RESPIRATION RATE: 16 BRPM | OXYGEN SATURATION: 100 % | HEIGHT: 61 IN | DIASTOLIC BLOOD PRESSURE: 70 MMHG | WEIGHT: 156 LBS | BODY MASS INDEX: 29.45 KG/M2 | HEART RATE: 91 BPM | TEMPERATURE: 97.1 F | SYSTOLIC BLOOD PRESSURE: 116 MMHG

## 2022-12-22 DIAGNOSIS — F32.A ANXIETY AND DEPRESSION: ICD-10-CM

## 2022-12-22 DIAGNOSIS — E55.9 VITAMIN D INSUFFICIENCY: ICD-10-CM

## 2022-12-22 DIAGNOSIS — E03.9 HYPOTHYROIDISM (ACQUIRED): ICD-10-CM

## 2022-12-22 DIAGNOSIS — F41.9 ANXIETY AND DEPRESSION: ICD-10-CM

## 2022-12-22 DIAGNOSIS — N39.3 STRESS INCONTINENCE, FEMALE: ICD-10-CM

## 2022-12-22 DIAGNOSIS — G47.33 OSA (OBSTRUCTIVE SLEEP APNEA): ICD-10-CM

## 2022-12-22 DIAGNOSIS — R05.8 POST-VIRAL COUGH SYNDROME: ICD-10-CM

## 2022-12-22 PROCEDURE — 99214 OFFICE O/P EST MOD 30 MIN: CPT | Performed by: FAMILY MEDICINE

## 2022-12-22 ASSESSMENT — FIBROSIS 4 INDEX: FIB4 SCORE: 0.64

## 2022-12-22 NOTE — PROGRESS NOTES
SUBJECTIVE:    Chief Complaint   Patient presents with    Cough     X 2 months       HPI:     Milli Santizo is a 37 y.o. female with chronic fatigue, anxiety/depression, hypothyroidism and SHIMON here for symptoms of cough.    Was having cough since after halloween.   Daughter also on and off with symptoms, she was positive for RSV.   Sneezing better, congestion better.   Nyquil, honey, green tea.   Was evaluated in urgent care given corticosteroid and albuterol.  Tessalon Perles.  Chest x-ray was done 12/16/2022.  A few days ago on Sat/Sun right eye was getting swollen and hurt, but it is better now.   Has slight congestion and mostly dry cough.  No significant sinus congestion which she notices.  Notes some urinary leakage with coughing.  Phlegm still there, irritation is better after medication.  80-90 % better with cough symptoms.  Has not been utilizing with the mask routinely.      Anxiety/depression-2 weeks on zoloft- helped with her symptoms prior to her cycle, then had mood swings again. Wanted to take for just a few days with her symptoms.   Future planning to conceive plan to try to conceive in the future there is concerned about staying on Zoloft.  Reviewed taking one half tab week prior to cycles.    Had thyroid labs yesterday per specialist which are stable.  Vitamin D insufficiency-has not been consistent with taking vitamin D.  Taking about 3 to 4 days a week.    ROS:  No recent fevers or chills. No nausea or vomiting. No diarrhea. No chest pains or shortness of breath. No lower extremity edema.    Current Outpatient Medications on File Prior to Visit   Medication Sig Dispense Refill    albuterol 108 (90 Base) MCG/ACT Aero Soln inhalation aerosol Inhale 2 Puffs every 6 hours as needed for Shortness of Breath. 8.5 g 0    benzonatate (TESSALON) 100 MG Cap Take 1 Capsule by mouth 3 times a day as needed for Cough for up to 10 days. 30 Capsule 0    sertraline (ZOLOFT) 25 MG tablet TAKE 1 TABLET BY  "MOUTH EVERY DAY 30 Tablet 3    metFORMIN (GLUCOPHAGE) 500 MG Tab Take 1 Tablet by mouth 2 times a day with meals. 180 Tablet 1    levothyroxine (SYNTHROID) 75 MCG Tab       Cholecalciferol (VITAMIN D) 2000 UNIT Tab Take  by mouth every day.       No current facility-administered medications on file prior to visit.       No Known Allergies    Patient Active Problem List    Diagnosis Date Noted    SHIMON (obstructive sleep apnea) 10/18/2022    Impaired fasting glucose 04/28/2021    Hypothyroidism due to Hashimoto's thyroiditis 04/28/2021    History of hypothyroidism 09/23/2020    Lactation problem 01/24/2020    Lactation suppression 01/24/2020    Slow transit constipation 08/08/2019    Normocytic anemia 08/08/2019    Vitamin D insufficiency 08/08/2019    Chronic fatigue 04/23/2019    PCOS (polycystic ovarian syndrome) 04/26/2018    Meralgia paresthetica of right side 06/14/2017    Generalized anxiety disorder 06/14/2017    Hypothyroidism (acquired) 04/05/2017    Acne vulgaris 04/05/2017    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   Neck: supple, no lymphadenopathy- cervical or supraclavicular, no thyromegaly  Cardiovascular: regular rate and rhythm, no murmurs, gallops, rubs  Lungs: clear to auscultation bilaterally, no wheezes, crackles, or rhonchi  Abdomen: +bowel sounds, soft, nontender, nondistended, no rebound, no guarding, no hepatosplenomegaly  Extremities: no cyanosis, clubbing, edema  Skin: Warm and dry  Psych: appropriate " mood and affect    DX-CHEST-2 VIEWS  Order: [id number]  Status: Final result     Visible to patient: Yes (seen)     Next appt: 04/10/2023 at 10:20 AM in Pulmonary and Sleep Medicine (Todd Anaya M.D.)     Dx: Subacute cough     0 Result Notes  Details    Reading Physician Reading Date Result Priority   Americo Hernandez M.D.  [phone] 12/16/2022 Urgent Care     Narrative & Impression   Chest radiographs, 2 views.  12/16/2022 12:37 PM     HISTORY/REASON FOR EXAM:  Cough.     COMPARISON:  4/4/11     FINDINGS:  The lungs are clear. No consolidation, edema, effusion, or pneumothorax is noted. Cardiac silhouette is within normal limits in size and unremarkable. Osseous structures and soft tissues are unremarkable.     IMPRESSION:        No acute cardiopulmonary process.           Exam Ended: 12/16/22 12:49 PM Last Resulted: 12/16/22 12:52 PM             ASSESSMENT/PLAN:    37 y.o.female with hypothyroidism, anxiety and depression, chronic fatigue and SHIMON.      1. Post-viral cough syndrome -currently appears much improved and continuing to improve.  Appears residual symptoms which will likely continue to improve at this time.  May use albuterol and Tessalon Perles as needed.  Otherwise may consider inhaled corticosteroid if any persistent symptoms.  Continue to monitor at this time.  Follow-up if no further improvements in the next 2 to 3 weeks.       2. Stress incontinence, female -due to cough symptoms.  Recommend routine Kegel exercises and use of pad as needed.       3. SHIMON (obstructive sleep apnea) -none currently without routine use of CPAP.  She will plan to restart use of mask/CPAP in future.       4. Anxiety and depression-appears component of premenstrual symptoms.  Has noted some improvement with use of Zoloft.  Reviewed that medication is usually meant to be taken routinely.  However, as patient is trying to conceive discussed trial of use of medication a week prior to menstrual period when she is  having more active symptoms.    We will continue monitor at this time.       5. Hypothyroidism (acquired)-stable.  Continue current medication per recommendation of endocrinologist.    6.      Vitamin D insufficiency-has not been consistent with taking supplement.  Recommend she double dosed to 4000 IU when taking supplement.  Encouraged to take routinely.  Monitor labs in future.         Return in about 3 months (around 3/22/2023).  Follow-up sooner as needed.    This medical record contains text that has been entered with the assistance of computer voice recognition and dictation software.  Therefore, it may contain unintended errors in text, spelling, punctuation, or grammar.

## 2023-03-27 ENCOUNTER — HOSPITAL ENCOUNTER (OUTPATIENT)
Dept: LAB | Facility: MEDICAL CENTER | Age: 38
End: 2023-03-27
Attending: PHYSICIAN ASSISTANT
Payer: COMMERCIAL

## 2023-03-27 LAB
T4 FREE SERPL-MCNC: 1.43 NG/DL (ref 0.93–1.7)
TSH SERPL DL<=0.005 MIU/L-ACNC: 0.89 UIU/ML (ref 0.38–5.33)

## 2023-03-27 PROCEDURE — 84439 ASSAY OF FREE THYROXINE: CPT

## 2023-03-27 PROCEDURE — 84443 ASSAY THYROID STIM HORMONE: CPT

## 2023-03-27 PROCEDURE — 36415 COLL VENOUS BLD VENIPUNCTURE: CPT

## 2023-04-10 ENCOUNTER — OFFICE VISIT (OUTPATIENT)
Dept: SLEEP MEDICINE | Facility: MEDICAL CENTER | Age: 38
End: 2023-04-10
Attending: STUDENT IN AN ORGANIZED HEALTH CARE EDUCATION/TRAINING PROGRAM
Payer: COMMERCIAL

## 2023-04-10 VITALS
BODY MASS INDEX: 30.04 KG/M2 | HEIGHT: 60 IN | RESPIRATION RATE: 14 BRPM | SYSTOLIC BLOOD PRESSURE: 100 MMHG | DIASTOLIC BLOOD PRESSURE: 64 MMHG | WEIGHT: 153 LBS | HEART RATE: 80 BPM | OXYGEN SATURATION: 97 %

## 2023-04-10 DIAGNOSIS — G47.33 OSA (OBSTRUCTIVE SLEEP APNEA): Primary | ICD-10-CM

## 2023-04-10 PROCEDURE — 99203 OFFICE O/P NEW LOW 30 MIN: CPT | Performed by: STUDENT IN AN ORGANIZED HEALTH CARE EDUCATION/TRAINING PROGRAM

## 2023-04-10 PROCEDURE — 99212 OFFICE O/P EST SF 10 MIN: CPT | Performed by: STUDENT IN AN ORGANIZED HEALTH CARE EDUCATION/TRAINING PROGRAM

## 2023-04-10 ASSESSMENT — FIBROSIS 4 INDEX: FIB4 SCORE: 0.64

## 2023-04-10 NOTE — PROGRESS NOTES
[facility] Consult Note     Date: 4/10/2023 / Time: 10:20 AM      Thank you for requesting a sleep medicine consultation on Milli Santizo at the sleep center. Presents today with the chief complaints of snoring, nonrestorative sleep, brain fog, irritability, and untreated obstructive sleep apnea. She is referred [place] [id number] for evaluation and treatment of obstructive sleep apnea.      HISTORY OF PRESENT ILLNESS:     Milli Santizo is a 37 y.o. female with hypothyroidism, anxiety, PCOS, overweight and severe obstructive sleep apnea.  Presents to Sleep Clinic for further evaluation of sleep.    She was diagnosed through [facility] in October 2021 which showed severe obstructive sleep apnea with an overall AHI of 37.7, and time below 89% of 23.7 minutes.  She states she was started on a CPAP machine in July of last year.  She used it as much as she could up until approximately December.  She stopped using it in December.  She found it was difficult to use it for 6 hours a day.  She only sleeps sometimes 3 to 4 hours a day.  In addition she did not find the mask comfortable.  She did try nasal pillows at first and then transition to a fullface mask.  She did not like either for the most part.  Overall she finds the mask and equipment to be cumbersome at times.  She finds having to clean them and replacing it regularly to be an inconvenience.    She does snore in her sleep.  This disrupts her  sleep which is one of the main reason she is here today.  She does have a limited sleep schedule.  She often goes to bed between 1 and 2 in the morning.  She will then wake up to start her day between 7 and 730.  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  She studies at night after everyone goes to bed and finishes her course work and goes to bed.  If she is able during the day she may take an hour to nap which she  "does find restorative.  She is interested in pursuing alternatives to CPAP therapy given her frustrations with it in the past.    As per supplemental questionnaire to be scanned or imported into chart:    O'Brien Sleepiness Score: 4    Sleep Schedule  Bedtime:  1-2am Weekend 12am to 230am  Wake time:  730am Weekend 730 to 8am  Sleep-onset latency: mins to 45min   Awakenings from sleep: 0-2  Difficulty falling back asleep: No  Bedroom partner: yes  Naps: Yes, ideally daily for 1-2 hours     DAYTIME SYMPTOMS:   Excessive daytime sleepiness: No   Daytime fatigue: Yes  Difficulty concentrating: Yes  Memory problems: Yes  Irritability:Yes  Work/school performance issues: Yes focus at times   Sleepiness with driving: No   Caffeine/stimulant use: Yes every other day   Alcohol use:Yes, How Many? Occasionally      SLEEP RELATED SYMPTOMS  Snoring: Yes  Witnessed apnea or gasping/choking: Yes  Dry mouth or mouth breathing: Yes  Sweating: No   Teeth grinding/biting: No   Morning headaches: No   Refreshed Upon Awakening: No      SLEEP RELATED BEHAVIORS:  Parasomnias (walking, talking, eating, violence): No   Leg kicking: No   Restless legs - \"urge to move\": No   Nightmares: No  Recurrent: No   Dream enactment: No      NARCOLEPSY:  Cataplexy: No   Sleep paralysis: No   Sleep attacks: No   Hypnagogic/hypnopompic hallucinations: No     MEDICAL HISTORY  Past Medical History:   Diagnosis Date    Anxiety     PCOS (polycystic ovarian syndrome)     Positive PPD     Thyroid disease         SURGICAL HISTORY  Past Surgical History:   Procedure Laterality Date    [place]  DELIVERY ONLY N/A 2019    Procedure:  SECTION, PRIMARY;  Surgeon: Nadira Abrams M.D.;  Location: LABOR AND DELIVERY;  Service: Labor and Delivery        FAMILY HISTORY  Family History   Problem Relation Age of Onset    Arthritis Mother     Thyroid Mother         hypothyroid    Hypertension Father     Arthritis Maternal Grandmother     " Hypertension Paternal Grandmother        SOCIAL HISTORY  Social History     Socioeconomic History    Marital status:     Number of children: 0    Highest education level: Bachelor's degree (e.g., BA, AB, BS)   Occupational History    Occupation: Health Information Management     Employer: [employer]   Tobacco Use    Smoking status: Never    Smokeless tobacco: Never   Vaping Use    Vaping Use: Never used   Substance and Sexual Activity    Alcohol use: Yes     Alcohol/week: 0.6 oz     Types: 1 Glasses of wine per week     Comment: infrequent, drinks 2-3 times a month    Drug use: No    Sexual activity: Yes     Partners: Male     Social Determinants of Health     Financial Resource Strain: Medium Risk    Difficulty of Paying Living Expenses: Somewhat hard   Food Insecurity: No Food Insecurity    Worried About Running Out of Food in the Last Year: Never true    Ran Out of Food in the Last Year: Never true   Transportation Needs: No Transportation Needs    Lack of Transportation (Medical): No    Lack of Transportation (Non-Medical): No   Physical Activity: Inactive    Days of Exercise per Week: 0 days    Minutes of Exercise per Session: 0 min   Stress: Stress Concern Present    Feeling of Stress : Rather much   Social Connections: Moderately Integrated    Frequency of Communication with Friends and Family: Twice a week    Frequency of Social Gatherings with Friends and Family: Once a week    Attends Confucianist Services: More than 4 times per year    Active Member of Clubs or Organizations: No    Attends Club or Organization Meetings: Never    Marital Status:    Housing Stability: Unknown    Unable to Pay for Housing in the Last Year: No    Unstable Housing in the Last Year: No        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     CURRENT MEDICATIONS  Current Outpatient Medications   Medication Sig Dispense Refill    metFORMIN (GLUCOPHAGE) 500 MG Tab Take 1 Tablet by mouth 2 times a day with meals. 180 Tablet 1     sertraline (ZOLOFT) 25 MG tablet TAKE 1 TABLET BY MOUTH EVERY DAY 30 Tablet 3    levothyroxine (SYNTHROID) 75 MCG Tab       Cholecalciferol (VITAMIN D) 2000 UNIT Tab Take  by mouth every day.      albuterol 108 (90 Base) MCG/ACT Aero Soln inhalation aerosol Inhale 2 Puffs every 6 hours as needed for Shortness of Breath. (Patient not taking: Reported on 4/10/2023) 8.5 g 0     No current facility-administered medications for this visit.       REVIEW OF SYSTEMS  Constitutional: Denies fevers, Denies weight changes  Ears/Nose/Throat/Mouth: Denies nasal congestion or sore throat   Cardiovascular: Denies chest pain  Respiratory: Denies shortness of breath, Denies cough  Gastrointestinal/Hepatic: Denies nausea, vomiting  Sleep: see HPI    Physical Examination:  Vitals/ General Appearance:   Weight/BMI: Body mass index is 29.88 kg/m².  Ht 1.524 m (5')   Wt 69.4 kg (153 lb)   Vitals:    04/10/23 1016   Weight: 69.4 kg (153 lb)   Height: 1.524 m (5')       Pt. is alert and oriented to time, place and person. Cooperative and in no apparent distress.     Constitutional: Alert, no distress, well-groomed.  Skin: No rashes in visible areas.  Eye: Round. Conjunctiva clear, lids normal. No icterus.   ENT EXAM  Nasal alae/valves collapsible: No   Nasal septum deviation: No   Nasal turbinate hypertrophy: Left: Grade 1   Right: Grade 1  Hard palate narrow: Yes  Hard palate high: No   Soft palate/uvula (Mallampati score): 4  Tongue Scalloping: Yes  Retrognathia: Yes  Micrognathia: No   Cardiovascular:no murmus/gallops/rubs, normal S1 and S2 heart sounds, regular rate and rhythm  Pulmonary:Clear to auscultation, No wheezes, No crackles.  Neurologic:Awake, alert and oriented x 3, Normal age appropriate gait, No involuntary motions.  Extremities: No clubbing, cyanosis, or edema       ASSESSMENT AND PLAN   Milli Santizo is a 37 y.o. female with hypothyroidism, anxiety, PCOS, overweight and severe obstructive sleep apnea.  Presents to  Sleep Clinic for further evaluation of sleep.    1.Obstructive sleep apnea   Reviewed past sleep study with patient showing an AHI of 37.7 and time at or below 88% saturation 23.7 minutes  Based on sleep study and symptoms meets criteria for severe obstructive sleep apnea.   We discussed the pathophysiology of obstructive sleep apnea (SHIMON) and risk factors for the disease. We also discussed possible consequences of untreated SHIMON, including excessive daytime sleepiness and fatigue, cognitive dysfunction, cardiovascular complications such as elevated blood pressure, heart attacks, cardiac arrhythmias, and strokes. We discussed how SHIMON typically gets worse with age. We discussed treatment options for SHIMON, including the gold standard therapy (PAP), alternative options such as a mandibular advancement device (custom-made oral appliances) and surgeries.     She currently has a CPAP at home when she is not using.  Advised that CPAP is the gold standard therapy for obstructive sleep apnea.  Reviewed alternatives.  Patient would like to look into oral appliance therapy.  Information given to patient on oral appliance therapy.    RECOMMENDATIONS  -Information given on oral appliance therapy  -Advised to reach out to dentistry regarding oral appliance therapy  -Patient counseled to avoid driving when sleepy. Encouraged to anticipate sleepiness, consider taking a 10 min nap prior to driving, alternate with another , or pull over if sleepy while driving  -Advised to contact our office or myself with any questions via MyChart  -Follow up in 6 months or sooner if needed      2.  Regarding treatment of other past medical problems and general health maintenance,  Pt is urged to follow up with PCP.      Please note portions of this record was created using voice recognition software. I have made every reasonable attempt to correct obvious errors, but I expect that there are errors of grammar and possibly content I did not  discover before finalizing the note.

## 2023-09-28 ENCOUNTER — HOSPITAL ENCOUNTER (OUTPATIENT)
Dept: LAB | Facility: MEDICAL CENTER | Age: 38
End: 2023-09-28
Attending: FAMILY MEDICINE
Payer: COMMERCIAL

## 2023-09-28 DIAGNOSIS — E03.9 HYPOTHYROIDISM (ACQUIRED): ICD-10-CM

## 2023-09-28 DIAGNOSIS — Z00.00 WELL ADULT EXAM: ICD-10-CM

## 2023-09-28 DIAGNOSIS — E55.9 VITAMIN D INSUFFICIENCY: ICD-10-CM

## 2023-09-28 DIAGNOSIS — E78.00 ELEVATED LDL CHOLESTEROL LEVEL: ICD-10-CM

## 2023-09-28 DIAGNOSIS — Z13.1 SCREENING FOR DIABETES MELLITUS: ICD-10-CM

## 2023-09-28 LAB
25(OH)D3 SERPL-MCNC: 27 NG/ML (ref 30–100)
ALBUMIN SERPL BCP-MCNC: 4.3 G/DL (ref 3.2–4.9)
ALBUMIN/GLOB SERPL: 1.4 G/DL
ALP SERPL-CCNC: 63 U/L (ref 30–99)
ALT SERPL-CCNC: 11 U/L (ref 2–50)
ANION GAP SERPL CALC-SCNC: 11 MMOL/L (ref 7–16)
AST SERPL-CCNC: 16 U/L (ref 12–45)
BILIRUB SERPL-MCNC: 0.2 MG/DL (ref 0.1–1.5)
BUN SERPL-MCNC: 14 MG/DL (ref 8–22)
CALCIUM ALBUM COR SERPL-MCNC: 8.4 MG/DL (ref 8.5–10.5)
CALCIUM SERPL-MCNC: 8.6 MG/DL (ref 8.5–10.5)
CHLORIDE SERPL-SCNC: 107 MMOL/L (ref 96–112)
CHOLEST SERPL-MCNC: 183 MG/DL (ref 100–199)
CO2 SERPL-SCNC: 21 MMOL/L (ref 20–33)
CREAT SERPL-MCNC: 0.69 MG/DL (ref 0.5–1.4)
ERYTHROCYTE [DISTWIDTH] IN BLOOD BY AUTOMATED COUNT: 40.3 FL (ref 35.9–50)
EST. AVERAGE GLUCOSE BLD GHB EST-MCNC: 111 MG/DL
FASTING STATUS PATIENT QL REPORTED: NORMAL
GFR SERPLBLD CREATININE-BSD FMLA CKD-EPI: 115 ML/MIN/1.73 M 2
GLOBULIN SER CALC-MCNC: 3 G/DL (ref 1.9–3.5)
GLUCOSE SERPL-MCNC: 90 MG/DL (ref 65–99)
HBA1C MFR BLD: 5.5 % (ref 4–5.6)
HCT VFR BLD AUTO: 47 % (ref 37–47)
HDLC SERPL-MCNC: 40 MG/DL
HGB BLD-MCNC: 14.5 G/DL (ref 12–16)
LDLC SERPL CALC-MCNC: 121 MG/DL
MCH RBC QN AUTO: 26.4 PG (ref 27–33)
MCHC RBC AUTO-ENTMCNC: 30.9 G/DL (ref 32.2–35.5)
MCV RBC AUTO: 85.6 FL (ref 81.4–97.8)
PLATELET # BLD AUTO: 230 K/UL (ref 164–446)
PMV BLD AUTO: 11.6 FL (ref 9–12.9)
POTASSIUM SERPL-SCNC: 4.6 MMOL/L (ref 3.6–5.5)
PROT SERPL-MCNC: 7.3 G/DL (ref 6–8.2)
RBC # BLD AUTO: 5.49 M/UL (ref 4.2–5.4)
SODIUM SERPL-SCNC: 139 MMOL/L (ref 135–145)
TRIGL SERPL-MCNC: 111 MG/DL (ref 0–149)
TSH SERPL DL<=0.005 MIU/L-ACNC: 0.76 UIU/ML (ref 0.38–5.33)
WBC # BLD AUTO: 7.5 K/UL (ref 4.8–10.8)

## 2023-09-28 PROCEDURE — 36415 COLL VENOUS BLD VENIPUNCTURE: CPT

## 2023-09-28 PROCEDURE — 85027 COMPLETE CBC AUTOMATED: CPT

## 2023-09-28 PROCEDURE — 84443 ASSAY THYROID STIM HORMONE: CPT

## 2023-09-28 PROCEDURE — 82306 VITAMIN D 25 HYDROXY: CPT

## 2023-09-28 PROCEDURE — 80053 COMPREHEN METABOLIC PANEL: CPT

## 2023-09-28 PROCEDURE — 83036 HEMOGLOBIN GLYCOSYLATED A1C: CPT

## 2023-09-28 PROCEDURE — 80061 LIPID PANEL: CPT

## 2023-10-04 ENCOUNTER — OFFICE VISIT (OUTPATIENT)
Dept: MEDICAL GROUP | Facility: IMAGING CENTER | Age: 38
End: 2023-10-04
Payer: COMMERCIAL

## 2023-10-04 VITALS
OXYGEN SATURATION: 99 % | BODY MASS INDEX: 29.94 KG/M2 | SYSTOLIC BLOOD PRESSURE: 108 MMHG | DIASTOLIC BLOOD PRESSURE: 60 MMHG | HEIGHT: 60 IN | HEART RATE: 60 BPM | TEMPERATURE: 97.6 F | RESPIRATION RATE: 15 BRPM | WEIGHT: 152.5 LBS

## 2023-10-04 DIAGNOSIS — Z01.84 IMMUNITY STATUS TESTING: ICD-10-CM

## 2023-10-04 DIAGNOSIS — E03.9 HYPOTHYROIDISM (ACQUIRED): ICD-10-CM

## 2023-10-04 DIAGNOSIS — F32.A ANXIETY AND DEPRESSION: ICD-10-CM

## 2023-10-04 DIAGNOSIS — Z30.09 FAMILY PLANNING: ICD-10-CM

## 2023-10-04 DIAGNOSIS — E55.9 VITAMIN D INSUFFICIENCY: ICD-10-CM

## 2023-10-04 DIAGNOSIS — R89.9 ABNORMAL LABORATORY TEST: ICD-10-CM

## 2023-10-04 DIAGNOSIS — E78.00 ELEVATED LDL CHOLESTEROL LEVEL: ICD-10-CM

## 2023-10-04 DIAGNOSIS — R73.01 IMPAIRED FASTING GLUCOSE: ICD-10-CM

## 2023-10-04 DIAGNOSIS — Z11.59 NEED FOR HEPATITIS C SCREENING TEST: ICD-10-CM

## 2023-10-04 DIAGNOSIS — Z87.42 HISTORY OF HEAVY PERIODS: ICD-10-CM

## 2023-10-04 DIAGNOSIS — G47.33 OSA (OBSTRUCTIVE SLEEP APNEA): ICD-10-CM

## 2023-10-04 DIAGNOSIS — F41.9 ANXIETY AND DEPRESSION: ICD-10-CM

## 2023-10-04 DIAGNOSIS — E28.2 PCOS (POLYCYSTIC OVARIAN SYNDROME): ICD-10-CM

## 2023-10-04 DIAGNOSIS — Z00.00 WELL ADULT EXAM: ICD-10-CM

## 2023-10-04 PROCEDURE — 3074F SYST BP LT 130 MM HG: CPT | Performed by: FAMILY MEDICINE

## 2023-10-04 PROCEDURE — 1126F AMNT PAIN NOTED NONE PRSNT: CPT | Performed by: FAMILY MEDICINE

## 2023-10-04 PROCEDURE — 99395 PREV VISIT EST AGE 18-39: CPT | Performed by: FAMILY MEDICINE

## 2023-10-04 PROCEDURE — 3078F DIAST BP <80 MM HG: CPT | Performed by: FAMILY MEDICINE

## 2023-10-04 SDOH — ECONOMIC STABILITY: INCOME INSECURITY: HOW HARD IS IT FOR YOU TO PAY FOR THE VERY BASICS LIKE FOOD, HOUSING, MEDICAL CARE, AND HEATING?: HARD

## 2023-10-04 SDOH — ECONOMIC STABILITY: INCOME INSECURITY: IN THE LAST 12 MONTHS, WAS THERE A TIME WHEN YOU WERE NOT ABLE TO PAY THE MORTGAGE OR RENT ON TIME?: NO

## 2023-10-04 SDOH — HEALTH STABILITY: PHYSICAL HEALTH: ON AVERAGE, HOW MANY MINUTES DO YOU ENGAGE IN EXERCISE AT THIS LEVEL?: 0 MIN

## 2023-10-04 SDOH — HEALTH STABILITY: MENTAL HEALTH
STRESS IS WHEN SOMEONE FEELS TENSE, NERVOUS, ANXIOUS, OR CAN'T SLEEP AT NIGHT BECAUSE THEIR MIND IS TROUBLED. HOW STRESSED ARE YOU?: VERY MUCH

## 2023-10-04 SDOH — ECONOMIC STABILITY: FOOD INSECURITY: WITHIN THE PAST 12 MONTHS, YOU WORRIED THAT YOUR FOOD WOULD RUN OUT BEFORE YOU GOT MONEY TO BUY MORE.: SOMETIMES TRUE

## 2023-10-04 SDOH — HEALTH STABILITY: PHYSICAL HEALTH: ON AVERAGE, HOW MANY DAYS PER WEEK DO YOU ENGAGE IN MODERATE TO STRENUOUS EXERCISE (LIKE A BRISK WALK)?: 0 DAYS

## 2023-10-04 SDOH — ECONOMIC STABILITY: FOOD INSECURITY: WITHIN THE PAST 12 MONTHS, THE FOOD YOU BOUGHT JUST DIDN'T LAST AND YOU DIDN'T HAVE MONEY TO GET MORE.: NEVER TRUE

## 2023-10-04 SDOH — ECONOMIC STABILITY: HOUSING INSECURITY

## 2023-10-04 ASSESSMENT — SOCIAL DETERMINANTS OF HEALTH (SDOH)
DO YOU BELONG TO ANY CLUBS OR ORGANIZATIONS SUCH AS CHURCH GROUPS UNIONS, FRATERNAL OR ATHLETIC GROUPS, OR SCHOOL GROUPS?: YES
HOW OFTEN DO YOU ATTENT MEETINGS OF THE CLUB OR ORGANIZATION YOU BELONG TO?: NEVER
HOW OFTEN DO YOU GET TOGETHER WITH FRIENDS OR RELATIVES?: NEVER
IN A TYPICAL WEEK, HOW MANY TIMES DO YOU TALK ON THE PHONE WITH FAMILY, FRIENDS, OR NEIGHBORS?: TWICE A WEEK
HOW OFTEN DO YOU GET TOGETHER WITH FRIENDS OR RELATIVES?: NEVER
HOW MANY DRINKS CONTAINING ALCOHOL DO YOU HAVE ON A TYPICAL DAY WHEN YOU ARE DRINKING: 1 OR 2
IN A TYPICAL WEEK, HOW MANY TIMES DO YOU TALK ON THE PHONE WITH FAMILY, FRIENDS, OR NEIGHBORS?: TWICE A WEEK
WITHIN THE PAST 12 MONTHS, YOU WORRIED THAT YOUR FOOD WOULD RUN OUT BEFORE YOU GOT THE MONEY TO BUY MORE: SOMETIMES TRUE
HOW OFTEN DO YOU HAVE A DRINK CONTAINING ALCOHOL: 2-4 TIMES A MONTH
DO YOU BELONG TO ANY CLUBS OR ORGANIZATIONS SUCH AS CHURCH GROUPS UNIONS, FRATERNAL OR ATHLETIC GROUPS, OR SCHOOL GROUPS?: YES
HOW HARD IS IT FOR YOU TO PAY FOR THE VERY BASICS LIKE FOOD, HOUSING, MEDICAL CARE, AND HEATING?: HARD
HOW OFTEN DO YOU HAVE SIX OR MORE DRINKS ON ONE OCCASION: NEVER
HOW OFTEN DO YOU ATTEND CHURCH OR RELIGIOUS SERVICES?: MORE THAN 4 TIMES PER YEAR
HOW OFTEN DO YOU ATTENT MEETINGS OF THE CLUB OR ORGANIZATION YOU BELONG TO?: NEVER
HOW OFTEN DO YOU ATTEND CHURCH OR RELIGIOUS SERVICES?: MORE THAN 4 TIMES PER YEAR

## 2023-10-04 ASSESSMENT — LIFESTYLE VARIABLES
HOW OFTEN DO YOU HAVE A DRINK CONTAINING ALCOHOL: 2-4 TIMES A MONTH
HOW MANY STANDARD DRINKS CONTAINING ALCOHOL DO YOU HAVE ON A TYPICAL DAY: 1 OR 2
AUDIT-C TOTAL SCORE: 2
HOW OFTEN DO YOU HAVE SIX OR MORE DRINKS ON ONE OCCASION: NEVER
SKIP TO QUESTIONS 9-10: 1

## 2023-10-04 ASSESSMENT — FIBROSIS 4 INDEX: FIB4 SCORE: 0.76

## 2023-10-04 ASSESSMENT — PAIN SCALES - GENERAL: PAINLEVEL: NO PAIN

## 2023-10-04 NOTE — PROGRESS NOTES
Chief Complaint   Patient presents with    Annual Exam    Lab Results       HPI:  38 y.o. female for well exam.  She is followed by pulmonary.    Hypothyroidism 75 mcg daily levothyroxine with extra half tab 1 day a week.    Vitamin D insufficiency-has been taking 1000 IU supplement.  She will plan to increase to 5000 IU daily supplement.  She is not on calcium supplement, will consider multivitamin.  LDL cholesterol 121-does have butter and cheese in her diet.  Low MCHC and MCH.  She does tend to have heavy periods.    Depression/anxiety-she did take some Zoloft as needed previously but does not want to continue on regular medication therapy.  Did try little counseling therapy but states she does not have the time and finances for it.  Otherwise has been stable.  Has been working on self-management techniques.    SHIMON-previously started on cpap, followed by pulmonary.    PCOS-has been on metformin routinely.  She does plan on conceiving.  Has appointment with Dr. Armstrong.  Recommended to start on prenatal vitamin.    Lifestyle:  Diet: healthy, occ wine/beer- once a week.  Exercise/Activities: housework, goes to [employer]    Health Maintenance:  Last pap: 2021- negative, hrhpv negative  Patient's last menstrual period was 09/06/2023 (approximate).  Immunizations: see below, influenza vaccine is out of stock today.    Health Maintenance Due   Topic Date Due    Hepatitis B Vaccine (Hep B) (1 of 3 - 3-dose series) Never done    Hepatitis C Screening  Never done    Chickenpox Vaccine (Varicella) (1 of 2 - 2-dose childhood series) Never done    HPV Vaccines (2 - 3-dose series) 12/18/2013    COVID-19 Vaccine (4 - Moderna series) 02/28/2022    Influenza Vaccine (1) 09/01/2023       Immunization History   Administered Date(s) Administered    HPV Quadrivalent Vaccine (GARDASIL) - HISTORICAL DATA 11/20/2013    INFLUENZA TIV (IM) 01/25/2013, 10/21/2013    Influenza (IM) Preservative Free - HISTORICAL DATA 10/07/2019     Influenza Seasonal Injectable - Historical Data 10/21/2013, 11/08/2014    Influenza Vaccine Pediatric Split - Historical Data 10/21/2013, 11/08/2014    Influenza Vaccine Quad Inj (Pf) 10/06/2017, 10/23/2018, 11/01/2020    Influenza Vaccine Quad Inj (Preserved) 10/18/2016    Influenza Vaccine Quad Recombinant 11/05/2021    MMR Vaccine 11/13/2012, 12/13/2012    MODERNA SARS-COV-2 VACCINE (12+) 04/14/2021, 05/12/2021    Tdap Vaccine 10/07/2019    Tuberculin Skin Test 04/01/2011         Review of Systems   Constitutional: Negative for fever, chills and malaise/fatigue.   HENT: Negative for congestion, sore throat, or swallowing issues.   Eyes: Negative for pain or vision changes.   Respiratory: Negative for cough and shortness of breath.    Cardiovascular: Negative for leg swelling. No chest pain.   Gastrointestinal: Negative for nausea, vomiting, abdominal pain and diarrhea.   Genitourinary: Negative for dysuria and hematuria.   Skin: Negative for rash.   Neurological: Negative for dizziness, focal weakness and headaches.   Endo/Heme/Allergies: Does not bruise/bleed easily.   Psychiatric/Behavioral: some depression/anxious- comes and goes.         Current Outpatient Medications:     metFORMIN (GLUCOPHAGE) 500 MG Tab, Take 1 Tablet by mouth 2 times a day with meals., Disp: 180 Tablet, Rfl: 1    levothyroxine (SYNTHROID) 75 MCG Tab, , Disp: , Rfl:     Cholecalciferol (VITAMIN D) 2000 UNIT Tab, Take  by mouth every day., Disp: , Rfl:     albuterol 108 (90 Base) MCG/ACT Aero Soln inhalation aerosol, Inhale 2 Puffs every 6 hours as needed for Shortness of Breath., Disp: 8.5 g, Rfl: 0    sertraline (ZOLOFT) 25 MG tablet, TAKE 1 TABLET BY MOUTH EVERY DAY, Disp: 30 Tablet, Rfl: 3    No Known Allergies    Patient Active Problem List   Diagnosis    Positive PPD    Family planning    Psychologic vaginismus    Hypothyroidism (acquired)    Acne vulgaris    Meralgia paresthetica of right side    Generalized anxiety disorder    PCOS  (polycystic ovarian syndrome)    Chronic fatigue    Slow transit constipation    Normocytic anemia    Vitamin D insufficiency    Lactation problem    Lactation suppression    History of hypothyroidism    Impaired fasting glucose    Hypothyroidism due to Hashimoto's thyroiditis    SHIMON (obstructive sleep apnea)       Past Medical History:   Diagnosis Date    Anxiety     PCOS (polycystic ovarian syndrome)     Positive PPD     Thyroid disease        Past Surgical History:   Procedure Laterality Date    [place]  DELIVERY ONLY N/A 2019    Procedure:  SECTION, PRIMARY;  Surgeon: Nadira Abrams M.D.;  Location: LABOR AND DELIVERY;  Service: Labor and Delivery       Family History   Problem Relation Age of Onset    Arthritis Mother     Thyroid Mother         hypothyroid    Hypertension Father     Arthritis Maternal Grandmother     Hypertension Paternal Grandmother        Social History     Tobacco Use    Smoking status: Never    Smokeless tobacco: Never   Vaping Use    Vaping Use: Never used   Substance Use Topics    Alcohol use: Yes     Alcohol/week: 0.6 oz     Types: 1 Glasses of wine per week     Comment: infrequent, drinks 2-3 times a month    Drug use: No         PHYSICAL EXAM:  /60 (BP Location: Left arm, Patient Position: Sitting, BP Cuff Size: Adult)   Pulse 60   Temp 36.4 °C (97.6 °F) (Temporal)   Resp 15   Ht 1.524 m (5')   Wt 69.2 kg (152 lb 8 oz)   LMP 2023 (Approximate)   SpO2 99%   BMI 29.78 kg/m²   Constitutional: She appears well-developed and well-nourished. She appears not diaphoretic. No distress.   HENT: Right Ear: External ear normal. Left Ear: External ear normal. Tympanic membranes clear and intact.   Nose: Nose normal.   Mouth/Throat: Oropharynx is clear and moist. No oropharyngeal exudate.     Eyes: Conjunctivae and extraocular motions are normal. Pupils are equal, round, and reactive to light. No scleral icterus.   Neck: Normal range of motion. Neck supple. No  thyromegaly present.   Cardiovascular: Normal rate, regular rhythm, normal heart sounds and intact distal pulses.  Exam reveals no gallop and no friction rub.  No murmur heard.   Pulmonary/Chest: Effort normal and breath sounds normal. No respiratory distress. She has no wheezes. She has no rales.   Abdominal: Soft. Bowel sounds are normal. She exhibits no distension and no mass. No tenderness. She has no rebound and no guarding.   Lymphadenopathy:  She has no cervical adenopathy.   Neurological: She is alert. She has normal reflexes. No cranial nerve deficit. She exhibits normal muscle tone.   Skin: Skin is warm and dry. No rash noted. She is not diaphoretic. No erythema.   Psychiatric: She has a normal mood and affect. Her behavior is normal.   Musculoskeletal: She exhibits no edema. Full strength throughout. 2+ DTR throughout.     Labs/Imaging:        Latest Reference Range & Units 09/28/23 08:09   WBC 4.8 - 10.8 K/uL 7.5   RBC 4.20 - 5.40 M/uL 5.49 (H)   Hemoglobin 12.0 - 16.0 g/dL 14.5   Hematocrit 37.0 - 47.0 % 47.0   MCV 81.4 - 97.8 fL 85.6   MCH 27.0 - 33.0 pg 26.4 (L)   MCHC 32.2 - 35.5 g/dL 30.9 (L)   RDW 35.9 - 50.0 fL 40.3   Platelet Count 164 - 446 K/uL 230   MPV 9.0 - 12.9 fL 11.6   Sodium 135 - 145 mmol/L 139   Potassium 3.6 - 5.5 mmol/L 4.6   Chloride 96 - 112 mmol/L 107   Co2 20 - 33 mmol/L 21   Anion Gap 7.0 - 16.0  11.0   Glucose 65 - 99 mg/dL 90   Bun 8 - 22 mg/dL 14   Creatinine 0.50 - 1.40 mg/dL 0.69   GFR (CKD-EPI) >60 mL/min/1.73 m 2 115   Calcium 8.5 - 10.5 mg/dL 8.6   Correct Calcium 8.5 - 10.5 mg/dL 8.4 (L)   AST(SGOT) 12 - 45 U/L 16   ALT(SGPT) 2 - 50 U/L 11   Alkaline Phosphatase 30 - 99 U/L 63   Total Bilirubin 0.1 - 1.5 mg/dL 0.2   Albumin 3.2 - 4.9 g/dL 4.3   Total Protein 6.0 - 8.2 g/dL 7.3   Globulin 1.9 - 3.5 g/dL 3.0   A-G Ratio g/dL 1.4   Glycohemoglobin 4.0 - 5.6 % 5.5   Estim. Avg Glu mg/dL 111   Fasting Status  Fasting   Cholesterol,Tot 100 - 199 mg/dL 183   Triglycerides 0 -  149 mg/dL 111   HDL >=40 mg/dL 40   LDL <100 mg/dL 121 (H)   25-Hydroxy   Vitamin D 25 30 - 100 ng/mL 27 (L)   TSH 0.380 - 5.330 uIU/mL 0.760   (H): Data is abnormally high  (L): Data is abnormally low        ASSESSMENT/PLAN:    This is a 38 y.o. female for well exam.     1. Well adult exam        2. Hypothyroidism (acquired) -stable.  TSH slightly on lower end.  Recommend levothyroxine 75 mcg daily, hold extra half tab 1 day a week.  Monitor labs. TSH WITH REFLEX TO FT4      3. Elevated LDL cholesterol level -stable.  Recommend low-cholesterol, high-fiber diet and routine exercise.  Continue to monitor in future. Comp Metabolic Panel    Lipid Profile      4. Impaired fasting glucose -stable.    Recommend low sugar/low processed food diet and routine exercise.    Continue on metformin 500 mg twice daily.  Monitor labs in future. Comp Metabolic Panel    HEMOGLOBIN A1C      5. Vitamin D insufficiency   Recommend vitamin D, 5000 IU daily.  Monitor labs. VITAMIN D,25 HYDROXY (DEFICIENCY)      6. Family planning   Start prenatal vitamin.  Follow-up with Dr. Armstrong as scheduled.       7. PCOS (polycystic ovarian syndrome) -stable.  She will continue on metformin therapy.  Monitor.       8. Immunity status testing  HEP B SURFACE AB      9. Abnormal laboratory test -H/H stable.  Low MCHC and MCH.  Monitor. FERRITIN    IRON/TOTAL IRON BIND      10. History of heavy periods -as above. FERRITIN    IRON/TOTAL IRON BIND      11. SHIMON (obstructive sleep apnea) -stable.  Continue current therapy.  Follow-up with pulmonary routinely.       12. Anxiety and depression -appears stable.  Currently off medication therapy.  Does not desire to continue medication therapy.  Tried counseling therapy.  Limited time and resources at this time.    Patient will work on self-care routines which was discussed in further detail in office.  Recommend healthy diet, routine exercise, routine stress management and adequate sleep.  Continue to monitor.        13. Need for hepatitis C screening test  HEP C VIRUS ANTIBODY      Reviewed healthcare maintenance and immunization recommendations.    Recommend healthy diet and routine exercise.  Recommend routine annual well visit.     Recommend routine labs and follow-up in 6 months.  Recommend routine annual well visit.      This medical record contains text that has been entered with the assistance of computer voice recognition and dictation software.  Therefore, it may contain unintended errors in text, spelling, punctuation, or grammar.      Zuri

## 2023-10-04 NOTE — PATIENT INSTRUCTIONS
Please repeat labs in 6 months.  You must fast 8 to 10 hours.  Lab orders are in the system which you can complete at any [facility].  Please follow-up for a visit after your lab work is complete.

## 2023-10-17 ENCOUNTER — HOSPITAL ENCOUNTER (OUTPATIENT)
Facility: MEDICAL CENTER | Age: 38
End: 2023-10-17
Attending: OBSTETRICS & GYNECOLOGY
Payer: COMMERCIAL

## 2023-10-17 LAB
EST. AVERAGE GLUCOSE BLD GHB EST-MCNC: 114 MG/DL
HBA1C MFR BLD: 5.6 % (ref 4–5.6)
PROLACTIN SERPL-MCNC: 8.53 NG/ML (ref 2.8–26)

## 2023-10-17 PROCEDURE — 83520 IMMUNOASSAY QUANT NOS NONAB: CPT

## 2023-10-17 PROCEDURE — 83036 HEMOGLOBIN GLYCOSYLATED A1C: CPT

## 2023-10-17 PROCEDURE — 84146 ASSAY OF PROLACTIN: CPT

## 2023-10-20 LAB — MIS SERPL-MCNC: 8.68 NG/ML (ref 0.18–11.71)

## 2023-10-24 RX ORDER — LEVOTHYROXINE SODIUM 0.07 MG/1
75 TABLET ORAL
Qty: 90 TABLET | Refills: 3 | Status: SHIPPED | OUTPATIENT
Start: 2023-10-24 | End: 2024-03-05 | Stop reason: SDUPTHER

## 2023-11-17 ENCOUNTER — OFFICE VISIT (OUTPATIENT)
Dept: URGENT CARE | Facility: PHYSICIAN GROUP | Age: 38
End: 2023-11-17
Payer: COMMERCIAL

## 2023-11-17 VITALS
TEMPERATURE: 97.8 F | HEIGHT: 60 IN | RESPIRATION RATE: 14 BRPM | HEART RATE: 70 BPM | SYSTOLIC BLOOD PRESSURE: 110 MMHG | WEIGHT: 157 LBS | OXYGEN SATURATION: 98 % | BODY MASS INDEX: 30.82 KG/M2 | DIASTOLIC BLOOD PRESSURE: 80 MMHG

## 2023-11-17 DIAGNOSIS — G89.29 CHRONIC BILATERAL THORACIC BACK PAIN: ICD-10-CM

## 2023-11-17 DIAGNOSIS — M54.6 CHRONIC BILATERAL THORACIC BACK PAIN: ICD-10-CM

## 2023-11-17 PROCEDURE — 3074F SYST BP LT 130 MM HG: CPT | Performed by: STUDENT IN AN ORGANIZED HEALTH CARE EDUCATION/TRAINING PROGRAM

## 2023-11-17 PROCEDURE — 99214 OFFICE O/P EST MOD 30 MIN: CPT | Performed by: STUDENT IN AN ORGANIZED HEALTH CARE EDUCATION/TRAINING PROGRAM

## 2023-11-17 PROCEDURE — 3079F DIAST BP 80-89 MM HG: CPT | Performed by: STUDENT IN AN ORGANIZED HEALTH CARE EDUCATION/TRAINING PROGRAM

## 2023-11-17 RX ORDER — CYCLOBENZAPRINE HCL 5 MG
5-10 TABLET ORAL
Qty: 8 TABLET | Refills: 0 | Status: SHIPPED | OUTPATIENT
Start: 2023-11-17

## 2023-11-17 RX ORDER — PREDNISONE 20 MG/1
20 TABLET ORAL DAILY
Qty: 5 TABLET | Refills: 0 | Status: SHIPPED | OUTPATIENT
Start: 2023-11-17 | End: 2023-11-22

## 2023-11-17 ASSESSMENT — FIBROSIS 4 INDEX: FIB4 SCORE: 0.76

## 2023-11-18 NOTE — PROGRESS NOTES
Subjective:   Milli Santizo is a 38 y.o. female who presents for Back Pain (Neck, mid and low back x3yr. Pt states it has gotten worse x3mo)      HPI:  38-year-old female presents to the urgent care for bilateral thoracic back pain.  She reports that she has a history of lower back pain and thoracic back pain for the past 2 to 3 years.  She will periodically have exacerbations of this.  She reports that her upper back has been becoming more painful over the past 3 weeks.  She denies any trauma or injury.  She states that it feels like muscle tightness.  She reports that certain movements such as twisting from left to right will cause muscle spasming and sharp pain.  Pain is alleviated with rest and with hot showers.  She has tried ibuprofen and Tylenol without significant improvement.  She has never participated in physical therapy for her back pain in the past.  No fever, chills, nausea, vomiting, chest pain, palpitations, shortness of breath, wheezing, dizziness, headache, loss of bladder control, loss of bowel control, saddle anesthesia, weakness, or radiation to the lower or upper extremities.      Medications:    albuterol Aers  cyclobenzaprine  levothyroxine Tabs  metFORMIN Tabs  predniSONE Tabs  vitamin D Tabs    Allergies: Patient has no known allergies.    Problem List: Milli Santizo does not have any pertinent problems on file.    Surgical History:  Past Surgical History:   Procedure Laterality Date    [place]  DELIVERY ONLY N/A 2019    Procedure:  SECTION, PRIMARY;  Surgeon: Nadira Abrams M.D.;  Location: LABOR AND DELIVERY;  Service: Labor and Delivery       Past Social Hx: Milli Santizo  reports that she has never smoked. She has never used smokeless tobacco. She reports current alcohol use of about 0.6 oz of alcohol per week. She reports that she does not use drugs.     Past Family Hx:  Milli Santizo family history includes Arthritis in her maternal grandmother and  mother; Cancer in her maternal grandmother; Hypertension in her father and paternal grandmother; Thyroid in her mother.     Problem list, medications, and allergies reviewed by myself today in Epic.     Objective:     /80   Pulse 70   Temp 36.6 °C (97.8 °F) (Temporal)   Resp 14   Ht 1.524 m (5')   Wt 71.2 kg (157 lb)   SpO2 98%   BMI 30.66 kg/m²     Physical Exam  Vitals reviewed.   Constitutional:       Appearance: Normal appearance.   Cardiovascular:      Rate and Rhythm: Normal rate.      Pulses: Normal pulses.   Pulmonary:      Effort: Pulmonary effort is normal.      Breath sounds: Normal breath sounds.   Musculoskeletal:      Cervical back: Normal range of motion.      Comments: No midline spinal tenderness, crepitus, or step-offs.  Full range of motion of the cervical spine.  Patient does have paraspinal muscle tenderness to the thoracic back bilaterally.  Increased muscle tone to the upper trapezius bilaterally.  No radiation of pain to the upper or lower extremities.  Increased pain with lateral bending/twisting to the left and right.  Pain also present with forward lumbar flexion.   Neurological:      General: No focal deficit present.      Mental Status: She is alert.         Assessment/Plan:     Diagnosis and associated orders:     1. Chronic bilateral thoracic back pain  Referral to Physical Therapy    Referral to Sports Medicine    predniSONE (DELTASONE) 20 MG Tab    cyclobenzaprine (FLEXERIL) 5 mg tablet         Comments/MDM:     Patient is here for chronic thoracic back pain.  She does have a history of intermittent chronic lower back pain as well.  No lower back pain at this time.  Thoracic back pain exacerbated over the past 3 weeks.  No trauma or injury.  She has trialed ibuprofen and Tylenol without significant improvement.  She does have some improvement with heat.  Advised to continue using heating pad 3-5 times daily for 20 minutes at a time.  Short course of prednisone sent to  the pharmacy to reduce acute inflammation.  Flexeril also sent to the pharmacy.  Advised on the risk of this medication and the possible side effects.  Advised only uses while at home.  First trial at bedtime.  Do not use while driving or at work.  Referral to physical therapy and sports medicine for further evaluation and management.  I do think she would benefit from physical therapy at this time.  Patient is agreeable to the plan.         Differential diagnosis, natural history, supportive care, and indications for immediate follow-up discussed.    Advised the patient to follow-up with the primary care physician for recheck, reevaluation, and consideration of further management.    Please note that this dictation was created using voice recognition software. I have made a reasonable attempt to correct obvious errors, but I expect that there are errors of grammar and possibly content that I did not discover before finalizing the note.    Electronically signed by Allan Adams PA-C.

## 2024-02-14 ENCOUNTER — EH NON-PROVIDER (OUTPATIENT)
Dept: OCCUPATIONAL MEDICINE | Facility: CLINIC | Age: 39
End: 2024-02-14

## 2024-02-14 ENCOUNTER — HOSPITAL ENCOUNTER (OUTPATIENT)
Facility: MEDICAL CENTER | Age: 39
End: 2024-02-14
Attending: PREVENTIVE MEDICINE
Payer: COMMERCIAL

## 2024-02-14 ENCOUNTER — EMPLOYEE HEALTH (OUTPATIENT)
Dept: OCCUPATIONAL MEDICINE | Facility: CLINIC | Age: 39
End: 2024-02-14

## 2024-02-14 DIAGNOSIS — Z02.89 VISIT FOR OCCUPATIONAL HEALTH EXAMINATION: ICD-10-CM

## 2024-02-14 DIAGNOSIS — Z02.89 VISIT FOR OCCUPATIONAL HEALTH EXAMINATION: Primary | ICD-10-CM

## 2024-02-14 LAB
AMP AMPHETAMINE: NORMAL
BAR BARBITURATES: NORMAL
BZO BENZODIAZEPINES: NORMAL
COC COCAINE: NORMAL
INT CON NEG: NORMAL
INT CON POS: NORMAL
MDMA ECSTASY: NORMAL
MET METHAMPHETAMINES: NORMAL
MTD METHADONE: NORMAL
OPI OPIATES: NORMAL
OXY OXYCODONE: NORMAL
PCP PHENCYCLIDINE: NORMAL
POC URINE DRUG SCREEN OCDRS: NORMAL
THC: NORMAL

## 2024-02-14 PROCEDURE — 86480 TB TEST CELL IMMUN MEASURE: CPT | Performed by: PREVENTIVE MEDICINE

## 2024-02-14 PROCEDURE — 8915 PR COMPREHENSIVE PHYSICAL: Performed by: PREVENTIVE MEDICINE

## 2024-02-14 PROCEDURE — 80305 DRUG TEST PRSMV DIR OPT OBS: CPT | Performed by: PREVENTIVE MEDICINE

## 2024-02-14 NOTE — PROGRESS NOTES
Pre Employment Drug Screen Completed  No Mask fit required   Docs: mmr, vzv, tdap, covid, and flu   Hep b declined

## 2024-02-16 LAB
GAMMA INTERFERON BACKGROUND BLD IA-ACNC: 0.04 IU/ML
M TB IFN-G BLD-IMP: NEGATIVE
M TB IFN-G CD4+ BCKGRND COR BLD-ACNC: 0.06 IU/ML
MITOGEN IGNF BCKGRD COR BLD-ACNC: 8.76 IU/ML
QFT TB2 - NIL TBQ2: 0.03 IU/ML

## 2024-05-03 ENCOUNTER — HOSPITAL ENCOUNTER (OUTPATIENT)
Facility: MEDICAL CENTER | Age: 39
End: 2024-05-03
Attending: PHYSICIAN ASSISTANT
Payer: COMMERCIAL

## 2024-05-09 LAB
CYTOLOGIST CVX/VAG CYTO: NORMAL
CYTOLOGY CVX/VAG DOC CYTO: NORMAL
CYTOLOGY CVX/VAG DOC THIN PREP: NORMAL
HPV I/H RISK 4 DNA CVX QL PROBE+SIG AMP: NEGATIVE
NOTE NL11727A: NORMAL
OTHER STN SPEC: NORMAL
STAT OF ADQ CVX/VAG CYTO-IMP: NORMAL

## 2024-06-05 ENCOUNTER — HOSPITAL ENCOUNTER (OUTPATIENT)
Dept: RADIOLOGY | Facility: MEDICAL CENTER | Age: 39
End: 2024-06-05
Attending: PHYSICIAN ASSISTANT
Payer: COMMERCIAL

## 2024-06-05 DIAGNOSIS — N93.9 HEMORRHAGE IN UTERUS: ICD-10-CM

## 2024-06-05 DIAGNOSIS — R10.2 PELVIC PAIN SYNDROME: ICD-10-CM

## 2024-06-05 PROCEDURE — 76830 TRANSVAGINAL US NON-OB: CPT

## 2024-07-08 ENCOUNTER — HOSPITAL ENCOUNTER (OUTPATIENT)
Facility: MEDICAL CENTER | Age: 39
End: 2024-07-08
Attending: PHYSICIAN ASSISTANT
Payer: COMMERCIAL

## 2024-07-08 LAB
FSH SERPL-ACNC: 8 MIU/ML
LH SERPL-ACNC: 16.9 IU/L
PROLACTIN SERPL-MCNC: 6.1 NG/ML (ref 2.8–26)
T4 FREE SERPL-MCNC: 1.43 NG/DL (ref 0.93–1.7)
TSH SERPL DL<=0.005 MIU/L-ACNC: 1.33 UIU/ML (ref 0.38–5.33)

## 2024-07-10 ENCOUNTER — OFFICE VISIT (OUTPATIENT)
Dept: MEDICAL GROUP | Facility: MEDICAL CENTER | Age: 39
End: 2024-07-10
Payer: COMMERCIAL

## 2024-07-10 VITALS
SYSTOLIC BLOOD PRESSURE: 114 MMHG | OXYGEN SATURATION: 99 % | TEMPERATURE: 97.4 F | WEIGHT: 154.6 LBS | HEART RATE: 86 BPM | RESPIRATION RATE: 16 BRPM | HEIGHT: 60 IN | BODY MASS INDEX: 30.35 KG/M2 | DIASTOLIC BLOOD PRESSURE: 68 MMHG

## 2024-07-10 DIAGNOSIS — U07.1 COVID-19: ICD-10-CM

## 2024-07-10 DIAGNOSIS — J02.9 ACUTE PHARYNGITIS, UNSPECIFIED ETIOLOGY: ICD-10-CM

## 2024-07-10 DIAGNOSIS — J06.9 ACUTE URI: ICD-10-CM

## 2024-07-10 LAB
FLUAV RNA SPEC QL NAA+PROBE: NEGATIVE
FLUBV RNA SPEC QL NAA+PROBE: NEGATIVE
RSV RNA SPEC QL NAA+PROBE: NEGATIVE
SARS-COV-2 RNA RESP QL NAA+PROBE: POSITIVE

## 2024-07-10 PROCEDURE — 0241U POCT CEPHEID COV-2, FLU A/B, RSV - PCR: CPT | Performed by: STUDENT IN AN ORGANIZED HEALTH CARE EDUCATION/TRAINING PROGRAM

## 2024-07-10 PROCEDURE — 99213 OFFICE O/P EST LOW 20 MIN: CPT | Performed by: STUDENT IN AN ORGANIZED HEALTH CARE EDUCATION/TRAINING PROGRAM

## 2024-07-10 PROCEDURE — 3074F SYST BP LT 130 MM HG: CPT | Performed by: STUDENT IN AN ORGANIZED HEALTH CARE EDUCATION/TRAINING PROGRAM

## 2024-07-10 PROCEDURE — 3078F DIAST BP <80 MM HG: CPT | Performed by: STUDENT IN AN ORGANIZED HEALTH CARE EDUCATION/TRAINING PROGRAM

## 2024-07-10 ASSESSMENT — FIBROSIS 4 INDEX: FIB4 SCORE: 0.78

## 2024-07-17 ENCOUNTER — APPOINTMENT (OUTPATIENT)
Dept: MEDICAL GROUP | Facility: IMAGING CENTER | Age: 39
End: 2024-07-17
Payer: COMMERCIAL

## 2024-07-17 ENCOUNTER — APPOINTMENT (OUTPATIENT)
Dept: MEDICAL GROUP | Facility: MEDICAL CENTER | Age: 39
End: 2024-07-17
Payer: COMMERCIAL

## 2024-07-19 ENCOUNTER — APPOINTMENT (OUTPATIENT)
Dept: MEDICAL GROUP | Facility: MEDICAL CENTER | Age: 39
End: 2024-07-19
Payer: COMMERCIAL

## 2024-08-22 ENCOUNTER — HOSPITAL ENCOUNTER (OUTPATIENT)
Dept: LAB | Facility: MEDICAL CENTER | Age: 39
End: 2024-08-22
Attending: FAMILY MEDICINE
Payer: COMMERCIAL

## 2024-08-22 DIAGNOSIS — E55.9 VITAMIN D INSUFFICIENCY: ICD-10-CM

## 2024-08-22 DIAGNOSIS — R89.9 ABNORMAL LABORATORY TEST: ICD-10-CM

## 2024-08-22 DIAGNOSIS — Z01.84 IMMUNITY STATUS TESTING: ICD-10-CM

## 2024-08-22 DIAGNOSIS — E03.9 HYPOTHYROIDISM (ACQUIRED): ICD-10-CM

## 2024-08-22 DIAGNOSIS — E78.00 ELEVATED LDL CHOLESTEROL LEVEL: ICD-10-CM

## 2024-08-22 DIAGNOSIS — Z87.42 HISTORY OF HEAVY PERIODS: ICD-10-CM

## 2024-08-22 DIAGNOSIS — R73.01 IMPAIRED FASTING GLUCOSE: ICD-10-CM

## 2024-08-22 DIAGNOSIS — Z11.59 NEED FOR HEPATITIS C SCREENING TEST: ICD-10-CM

## 2024-08-22 LAB
25(OH)D3 SERPL-MCNC: 25 NG/ML (ref 30–100)
ALBUMIN SERPL BCP-MCNC: 4.3 G/DL (ref 3.2–4.9)
ALBUMIN/GLOB SERPL: 1.4 G/DL
ALP SERPL-CCNC: 50 U/L (ref 30–99)
ALT SERPL-CCNC: 13 U/L (ref 2–50)
ANION GAP SERPL CALC-SCNC: 10 MMOL/L (ref 7–16)
AST SERPL-CCNC: 19 U/L (ref 12–45)
BILIRUB SERPL-MCNC: 0.4 MG/DL (ref 0.1–1.5)
BUN SERPL-MCNC: 8 MG/DL (ref 8–22)
CALCIUM ALBUM COR SERPL-MCNC: 8.6 MG/DL (ref 8.5–10.5)
CALCIUM SERPL-MCNC: 8.8 MG/DL (ref 8.5–10.5)
CHLORIDE SERPL-SCNC: 105 MMOL/L (ref 96–112)
CHOLEST SERPL-MCNC: 178 MG/DL (ref 100–199)
CO2 SERPL-SCNC: 21 MMOL/L (ref 20–33)
CREAT SERPL-MCNC: 0.63 MG/DL (ref 0.5–1.4)
FERRITIN SERPL-MCNC: 32.7 NG/ML (ref 10–291)
GFR SERPLBLD CREATININE-BSD FMLA CKD-EPI: 117 ML/MIN/1.73 M 2
GLOBULIN SER CALC-MCNC: 3 G/DL (ref 1.9–3.5)
GLUCOSE SERPL-MCNC: 82 MG/DL (ref 65–99)
HBV SURFACE AB SERPL IA-ACNC: 1999 MIU/ML (ref 0–10)
HCV AB SER QL: NORMAL
HDLC SERPL-MCNC: 42 MG/DL
IRON SATN MFR SERPL: 27 % (ref 15–55)
IRON SERPL-MCNC: 98 UG/DL (ref 40–170)
LDLC SERPL CALC-MCNC: 113 MG/DL
POTASSIUM SERPL-SCNC: 4.3 MMOL/L (ref 3.6–5.5)
PROT SERPL-MCNC: 7.3 G/DL (ref 6–8.2)
SODIUM SERPL-SCNC: 136 MMOL/L (ref 135–145)
TIBC SERPL-MCNC: 357 UG/DL (ref 250–450)
TRIGL SERPL-MCNC: 114 MG/DL (ref 0–149)
TSH SERPL DL<=0.005 MIU/L-ACNC: 3.63 UIU/ML (ref 0.38–5.33)
UIBC SERPL-MCNC: 259 UG/DL (ref 110–370)

## 2024-08-22 PROCEDURE — 84443 ASSAY THYROID STIM HORMONE: CPT

## 2024-08-22 PROCEDURE — 80061 LIPID PANEL: CPT

## 2024-08-22 PROCEDURE — 86706 HEP B SURFACE ANTIBODY: CPT

## 2024-08-22 PROCEDURE — 36415 COLL VENOUS BLD VENIPUNCTURE: CPT

## 2024-08-22 PROCEDURE — 82306 VITAMIN D 25 HYDROXY: CPT

## 2024-08-22 PROCEDURE — 82728 ASSAY OF FERRITIN: CPT

## 2024-08-22 PROCEDURE — 83550 IRON BINDING TEST: CPT

## 2024-08-22 PROCEDURE — 83540 ASSAY OF IRON: CPT

## 2024-08-22 PROCEDURE — 80053 COMPREHEN METABOLIC PANEL: CPT

## 2024-08-22 PROCEDURE — 86803 HEPATITIS C AB TEST: CPT

## 2024-08-24 SDOH — ECONOMIC STABILITY: FOOD INSECURITY: WITHIN THE PAST 12 MONTHS, YOU WORRIED THAT YOUR FOOD WOULD RUN OUT BEFORE YOU GOT MONEY TO BUY MORE.: SOMETIMES TRUE

## 2024-08-24 SDOH — ECONOMIC STABILITY: FOOD INSECURITY: WITHIN THE PAST 12 MONTHS, THE FOOD YOU BOUGHT JUST DIDN'T LAST AND YOU DIDN'T HAVE MONEY TO GET MORE.: SOMETIMES TRUE

## 2024-08-24 SDOH — HEALTH STABILITY: PHYSICAL HEALTH: ON AVERAGE, HOW MANY MINUTES DO YOU ENGAGE IN EXERCISE AT THIS LEVEL?: 0 MIN

## 2024-08-24 SDOH — ECONOMIC STABILITY: INCOME INSECURITY: IN THE LAST 12 MONTHS, WAS THERE A TIME WHEN YOU WERE NOT ABLE TO PAY THE MORTGAGE OR RENT ON TIME?: NO

## 2024-08-24 SDOH — HEALTH STABILITY: PHYSICAL HEALTH: ON AVERAGE, HOW MANY DAYS PER WEEK DO YOU ENGAGE IN MODERATE TO STRENUOUS EXERCISE (LIKE A BRISK WALK)?: 0 DAYS

## 2024-08-24 SDOH — ECONOMIC STABILITY: INCOME INSECURITY: HOW HARD IS IT FOR YOU TO PAY FOR THE VERY BASICS LIKE FOOD, HOUSING, MEDICAL CARE, AND HEATING?: HARD

## 2024-08-24 ASSESSMENT — SOCIAL DETERMINANTS OF HEALTH (SDOH)
HOW OFTEN DO YOU HAVE SIX OR MORE DRINKS ON ONE OCCASION: NEVER
HOW HARD IS IT FOR YOU TO PAY FOR THE VERY BASICS LIKE FOOD, HOUSING, MEDICAL CARE, AND HEATING?: HARD
DO YOU BELONG TO ANY CLUBS OR ORGANIZATIONS SUCH AS CHURCH GROUPS UNIONS, FRATERNAL OR ATHLETIC GROUPS, OR SCHOOL GROUPS?: YES
HOW OFTEN DO YOU ATTENT MEETINGS OF THE CLUB OR ORGANIZATION YOU BELONG TO?: 1 TO 4 TIMES PER YEAR
WITHIN THE PAST 12 MONTHS, YOU WORRIED THAT YOUR FOOD WOULD RUN OUT BEFORE YOU GOT THE MONEY TO BUY MORE: SOMETIMES TRUE
HOW OFTEN DO YOU ATTEND CHURCH OR RELIGIOUS SERVICES?: 1 TO 4 TIMES PER YEAR
HOW MANY DRINKS CONTAINING ALCOHOL DO YOU HAVE ON A TYPICAL DAY WHEN YOU ARE DRINKING: PATIENT DOES NOT DRINK
HOW OFTEN DO YOU GET TOGETHER WITH FRIENDS OR RELATIVES?: ONCE A WEEK
IN A TYPICAL WEEK, HOW MANY TIMES DO YOU TALK ON THE PHONE WITH FAMILY, FRIENDS, OR NEIGHBORS?: ONCE A WEEK
HOW OFTEN DO YOU ATTENT MEETINGS OF THE CLUB OR ORGANIZATION YOU BELONG TO?: 1 TO 4 TIMES PER YEAR
IN THE PAST 12 MONTHS, HAS THE ELECTRIC, GAS, OIL, OR WATER COMPANY THREATENED TO SHUT OFF SERVICE IN YOUR HOME?: NO
HOW OFTEN DO YOU HAVE A DRINK CONTAINING ALCOHOL: NEVER
HOW OFTEN DO YOU GET TOGETHER WITH FRIENDS OR RELATIVES?: ONCE A WEEK
HOW OFTEN DO YOU ATTEND CHURCH OR RELIGIOUS SERVICES?: 1 TO 4 TIMES PER YEAR
IN A TYPICAL WEEK, HOW MANY TIMES DO YOU TALK ON THE PHONE WITH FAMILY, FRIENDS, OR NEIGHBORS?: ONCE A WEEK
DO YOU BELONG TO ANY CLUBS OR ORGANIZATIONS SUCH AS CHURCH GROUPS UNIONS, FRATERNAL OR ATHLETIC GROUPS, OR SCHOOL GROUPS?: YES

## 2024-08-24 ASSESSMENT — LIFESTYLE VARIABLES
HOW OFTEN DO YOU HAVE SIX OR MORE DRINKS ON ONE OCCASION: NEVER
AUDIT-C TOTAL SCORE: 0
HOW MANY STANDARD DRINKS CONTAINING ALCOHOL DO YOU HAVE ON A TYPICAL DAY: PATIENT DOES NOT DRINK
HOW OFTEN DO YOU HAVE A DRINK CONTAINING ALCOHOL: NEVER
SKIP TO QUESTIONS 9-10: 1

## 2024-08-27 ENCOUNTER — OFFICE VISIT (OUTPATIENT)
Dept: MEDICAL GROUP | Facility: IMAGING CENTER | Age: 39
End: 2024-08-27
Payer: COMMERCIAL

## 2024-08-27 VITALS
DIASTOLIC BLOOD PRESSURE: 76 MMHG | TEMPERATURE: 97.9 F | WEIGHT: 154 LBS | RESPIRATION RATE: 16 BRPM | SYSTOLIC BLOOD PRESSURE: 106 MMHG | BODY MASS INDEX: 30.23 KG/M2 | HEIGHT: 60 IN | OXYGEN SATURATION: 98 % | HEART RATE: 90 BPM

## 2024-08-27 DIAGNOSIS — Z00.00 WELL ADULT EXAM: ICD-10-CM

## 2024-08-27 DIAGNOSIS — F32.81 PMDD (PREMENSTRUAL DYSPHORIC DISORDER): ICD-10-CM

## 2024-08-27 DIAGNOSIS — E03.9 HYPOTHYROIDISM (ACQUIRED): ICD-10-CM

## 2024-08-27 DIAGNOSIS — E55.9 VITAMIN D INSUFFICIENCY: ICD-10-CM

## 2024-08-27 DIAGNOSIS — N97.9 FEMALE FERTILITY PROBLEMS: ICD-10-CM

## 2024-08-27 DIAGNOSIS — E28.2 PCOS (POLYCYSTIC OVARIAN SYNDROME): ICD-10-CM

## 2024-08-27 RX ORDER — LEVOTHYROXINE SODIUM 88 UG/1
88 TABLET ORAL
Qty: 90 TABLET | Refills: 1 | Status: SHIPPED | OUTPATIENT
Start: 2024-08-27

## 2024-08-27 RX ORDER — ERGOCALCIFEROL 1.25 MG/1
50000 CAPSULE, LIQUID FILLED ORAL
Qty: 12 CAPSULE | Refills: 0 | Status: SHIPPED | OUTPATIENT
Start: 2024-08-27

## 2024-08-27 RX ORDER — SERTRALINE HYDROCHLORIDE 25 MG/1
25 TABLET, FILM COATED ORAL DAILY
Qty: 30 TABLET | Refills: 3 | Status: SHIPPED | OUTPATIENT
Start: 2024-08-27

## 2024-08-27 ASSESSMENT — FIBROSIS 4 INDEX: FIB4 SCORE: 0.85

## 2024-08-27 NOTE — PROGRESS NOTES
Chief Complaint   Patient presents with    Annual Exam     Lab results 8/22    Other     Consult mental health   PMS symptoms - anxiety,stress   Pt report getting more intense   Pt discus in passed for getting pregnant         HPI:  39 y.o. female for well exam.    Vitamin D insufficiency- on supplement, 4000 IU daily.  Has not taken it regularly.    Hypothyroidism-she has been on levothyroxine 75 mcg daily.  TSH slightly increased compared to prior labs.    Has had some worsening premenstrual symptoms anxiety and constantly worrying.  Mood is affected.  Thus would like to consider medication therapy.    She did have fertility evaluation about a year ago with Dr. Armstrong.  Was recommended take metformin 2000 mg daily.  Was recommended up to 2500 mg daily states she has been taking metformin 500 mg tablets-4 in the evening.  She has been trying to conceive.  Saw gynecology for evaluation.  Had fibroid noted.  Now is not with intercourse.  She does have PCOS.  Has been juggling home and work.  Started working at [employer] this year.  She has noticed that her periods are coming about 1 day later.      Lifestyle:  Diet: healthy  Exercise/Activities: not regular  Stressors: elevated   Social Support: good support  Work: hospital case management    Health Maintenance:  Last pap: Up-to-date.  Patient's last menstrual period was 08/24/2024.  Immunizations: see below      Health Maintenance Due   Topic Date Due    Hepatitis B Vaccine (Hep B) (1 of 3 - 19+ 3-dose series)-adequate titers on past. Never done    HPV Vaccines (2 - 3-dose series)-reviewed recommendation. 12/18/2013    COVID-19 Vaccine (4 - 2023-24 season) 09/01/2023   Declined hipv vaccine.     Immunization History   Administered Date(s) Administered    HPV Quadrivalent Vaccine (GARDASIL) - HISTORICAL DATA 11/20/2013    INFLUENZA TIV (IM) 01/25/2013, 10/21/2013    Influenza (IM) Preservative Free - HISTORICAL DATA 10/07/2019    Influenza Seasonal Injectable -  Historical Data 10/21/2013, 11/08/2014    Influenza Vac Subunit Quad Inj (Pf) 10/28/2023    Influenza Vaccine Pediatric Split - Historical Data 10/21/2013, 11/08/2014    Influenza Vaccine Quad Inj (Pf) 10/06/2017, 10/23/2018, 11/01/2020, 10/21/2022    Influenza Vaccine Quad Inj (Preserved) 10/18/2016    Influenza Vaccine Quad Recombinant 11/05/2021    MMR Vaccine 11/13/2012, 12/13/2012    MODERNA SARS-COV-2 VACCINE (12+) 04/14/2021, 05/12/2021, 01/03/2022    Tdap Vaccine 10/07/2019    Tuberculin Skin Test 04/01/2011         Review of Systems:  Constitutional: Negative for fever, chills and malaise/fatigue.   HENT: Negative for congestion, sore throat, or swallowing issues.   Eyes: Negative for pain or vision changes.   Respiratory: Negative for cough and shortness of breath.    Cardiovascular: Negative for leg swelling. No chest pain.   Gastrointestinal: Negative for nausea, vomiting, abdominal pain and diarrhea.   Genitourinary: Negative for dysuria and hematuria.   Skin: Negative for rash.   Neurological: Negative for dizziness, focal weakness and headaches.   Endo/Heme/Allergies: Does not bruise/bleed easily.   Psychiatric/Behavioral: as above.   Sometimes body aches and spasms.           Current Outpatient Medications:     levothyroxine (SYNTHROID) 75 MCG Tab, Take 1 Tablet by mouth every morning on an empty stomach., Disp: 90 Tablet, Rfl: 3    metFORMIN (GLUCOPHAGE) 500 MG Tab, Take 1 Tablet by mouth 2 times a day with meals., Disp: 180 Tablet, Rfl: 1    Cholecalciferol (VITAMIN D) 2000 UNIT Tab, Take  by mouth every day., Disp: , Rfl:     No Known Allergies    Patient Active Problem List   Diagnosis    Positive PPD    Family planning    Psychologic vaginismus    Hypothyroidism (acquired)    Acne vulgaris    Meralgia paresthetica of right side    Generalized anxiety disorder    PCOS (polycystic ovarian syndrome)    Chronic fatigue    Slow transit constipation    Normocytic anemia    Vitamin D insufficiency     Lactation problem    Lactation suppression    History of hypothyroidism    Impaired fasting glucose    Hypothyroidism due to Hashimoto's thyroiditis    SHIMON (obstructive sleep apnea)       Past Medical History:   Diagnosis Date    Anxiety     PCOS (polycystic ovarian syndrome)     Positive PPD     Thyroid disease        Past Surgical History:   Procedure Laterality Date    [place]  DELIVERY ONLY N/A 2019    Procedure:  SECTION, PRIMARY;  Surgeon: Nadira Abrams M.D.;  Location: LABOR AND DELIVERY;  Service: Labor and Delivery       Family History   Problem Relation Age of Onset    Arthritis Mother     Thyroid Mother         hypothyroid    Hypertension Father     Arthritis Maternal Grandmother     Cancer Maternal Grandmother         GI or gyn cancer    Hypertension Paternal Grandmother        Social History     Tobacco Use    Smoking status: Never    Smokeless tobacco: Never   Vaping Use    Vaping status: Never Used   Substance Use Topics    Alcohol use: Yes     Alcohol/week: 0.6 oz     Types: 1 Glasses of wine per week     Comment: infrequent, drinks 2-3 times a month    Drug use: No         PHYSICAL EXAM:  /76 (BP Location: Left arm, Patient Position: Sitting, BP Cuff Size: Adult)   Pulse 90   Temp 36.6 °C (97.9 °F) (Temporal)   Resp 16   Ht 1.524 m (5')   Wt 69.9 kg (154 lb)   LMP 2024   SpO2 98%   BMI 30.08 kg/m²   Constitutional: She appears well-developed and well-nourished. She appears not diaphoretic. No distress.   HENT: Right Ear: External ear normal. Left Ear: External ear normal. Tympanic membranes clear and intact.   Nose: Nose normal.   Mouth/Throat: Oropharynx is clear and moist. No oropharyngeal exudate.     Eyes: Conjunctivae and extraocular motions are normal. Pupils are equal, round, and reactive to light. No scleral icterus.   Neck: Normal range of motion. Neck supple. No thyromegaly present.   Cardiovascular: Normal rate, regular rhythm, normal heart sounds  and intact distal pulses.  Exam reveals no gallop and no friction rub.  No murmur heard.   Pulmonary/Chest: Effort normal and breath sounds normal. No respiratory distress. She has no wheezes. She has no rales.   Abdominal: Soft. Bowel sounds are normal. She exhibits no distension and no mass. No tenderness. She has no rebound and no guarding.   Lymphadenopathy:  She has no cervical adenopathy.   Neurological: She is alert. She has normal reflexes. No cranial nerve deficit. She exhibits normal muscle tone.   Skin: Skin is warm and dry. No rash noted. She is not diaphoretic. No erythema.   Psychiatric: She has a normal mood and affect. Her behavior is normal.   Musculoskeletal: She exhibits no edema. Full strength throughout. 2+ DTR throughout.     Labs/Imaging:        Latest Reference Range & Units 08/22/24 06:57   Sodium 135 - 145 mmol/L 136   Potassium 3.6 - 5.5 mmol/L 4.3   Chloride 96 - 112 mmol/L 105   Co2 20 - 33 mmol/L 21   Anion Gap 7.0 - 16.0  10.0   Glucose 65 - 99 mg/dL 82   Bun 8 - 22 mg/dL 8   Creatinine 0.50 - 1.40 mg/dL 0.63   GFR (CKD-EPI) >60 mL/min/1.73 m 2 117   Calcium 8.5 - 10.5 mg/dL 8.8   Correct Calcium 8.5 - 10.5 mg/dL 8.6   AST(SGOT) 12 - 45 U/L 19   ALT(SGPT) 2 - 50 U/L 13   Alkaline Phosphatase 30 - 99 U/L 50   Total Bilirubin 0.1 - 1.5 mg/dL 0.4   Albumin 3.2 - 4.9 g/dL 4.3   Total Protein 6.0 - 8.2 g/dL 7.3   Globulin 1.9 - 3.5 g/dL 3.0   A-G Ratio g/dL 1.4   Iron 40 - 170 ug/dL 98   Total Iron Binding 250 - 450 ug/dL 357   % Saturation 15 - 55 % 27   Unsat Iron Binding 110 - 370 ug/dL 259   Cholesterol,Tot 100 - 199 mg/dL 178   Triglycerides 0 - 149 mg/dL 114   HDL >=40 mg/dL 42   LDL <100 mg/dL 113 (H)   25-Hydroxy   Vitamin D 25 30 - 100 ng/mL 25 (L)   Ferritin 10.0 - 291.0 ng/mL 32.7   TSH 0.380 - 5.330 uIU/mL 3.630   Hep B Surface Antibody Quant 0.00 - 10.00 mIU/mL 1999.00 (H)   Hepatitis C Antibody Non-Reactive  Non-Reactive   (H): Data is abnormally high  (L): Data is abnormally  low      ASSESSMENT/PLAN:    This is a 39 y.o. female for well exam.     1. Well adult exam   Recommend healthy diet and routine exercise as tolerated.  Recommend routine self-care activities.  Counseled on routine management of stressors.  Consider meditation or breathing exercises.       2. Vitamin D insufficiency -continues to be low.  Will optimize with high-dose weekly supplement vitamin D.  Repeat labs in 6 to 8 weeks. Vitamin D 50,000 IU   VITAMIN D,25 HYDROXY (DEFICIENCY)      3. Hypothyroidism (acquired) -will plan to optimize levels for family planning.  Increase to levothyroxine 88 mcg daily.  Repeat labs in 6 to 8 weeks. Levothyroxine 88 mcg, TSH WITH REFLEX TO FT4      4. Female fertility problems -patient has had prior evaluation with fertility clinic.  Has had some issues with conceiving.  She will continue on metformin therapy.  Will optimize thyroid levels.       5. PCOS (polycystic ovarian syndrome) -stable.  Continue on metformin therapy.  Recommend metformin 1000 mg twice daily.       6. PMDD (premenstrual dysphoric disorder)   Reviewed options of therapy.  Reviewed potential effect for pregnancy.  She will try Zoloft therapy at this time.  Reviewed potential effects of medication.  Continue to monitor. Zoloft 25 mg      Reviewed healthcare maintenance and immunization recommendations.    Recommend healthy diet and routine exercise.  Recommend routine annual well visit.   Recommend regular use of seatbelt and home monitors- for smoke and carbon monoxide. Recommend routine dental visits.       Return in about 2 months (around 10/27/2024).        This medical record contains text that has been entered with the assistance of computer voice recognition and dictation software.  Therefore, it may contain unintended errors in text, spelling, punctuation, or grammar.

## 2024-08-30 ENCOUNTER — APPOINTMENT (OUTPATIENT)
Dept: MEDICAL GROUP | Facility: IMAGING CENTER | Age: 39
End: 2024-08-30
Payer: COMMERCIAL

## 2024-09-09 ENCOUNTER — OFFICE VISIT (OUTPATIENT)
Dept: MEDICAL GROUP | Facility: MEDICAL CENTER | Age: 39
End: 2024-09-09
Payer: COMMERCIAL

## 2024-09-09 ENCOUNTER — APPOINTMENT (OUTPATIENT)
Dept: URGENT CARE | Facility: CLINIC | Age: 39
End: 2024-09-09
Payer: COMMERCIAL

## 2024-09-09 VITALS
SYSTOLIC BLOOD PRESSURE: 100 MMHG | DIASTOLIC BLOOD PRESSURE: 60 MMHG | TEMPERATURE: 97.5 F | HEART RATE: 86 BPM | HEIGHT: 60 IN | OXYGEN SATURATION: 98 % | BODY MASS INDEX: 30.69 KG/M2 | WEIGHT: 156.31 LBS

## 2024-09-09 DIAGNOSIS — J02.9 PHARYNGITIS, UNSPECIFIED ETIOLOGY: ICD-10-CM

## 2024-09-09 DIAGNOSIS — J02.9 ACUTE PHARYNGITIS, UNSPECIFIED ETIOLOGY: ICD-10-CM

## 2024-09-09 LAB
FLUAV RNA SPEC QL NAA+PROBE: NEGATIVE
FLUBV RNA SPEC QL NAA+PROBE: NEGATIVE
RSV RNA SPEC QL NAA+PROBE: NEGATIVE
S PYO DNA SPEC NAA+PROBE: NOT DETECTED
SARS-COV-2 RNA RESP QL NAA+PROBE: NEGATIVE

## 2024-09-09 PROCEDURE — 87651 STREP A DNA AMP PROBE: CPT | Performed by: STUDENT IN AN ORGANIZED HEALTH CARE EDUCATION/TRAINING PROGRAM

## 2024-09-09 PROCEDURE — 3074F SYST BP LT 130 MM HG: CPT | Performed by: STUDENT IN AN ORGANIZED HEALTH CARE EDUCATION/TRAINING PROGRAM

## 2024-09-09 PROCEDURE — 0241U POCT CEPHEID COV-2, FLU A/B, RSV - PCR: CPT | Performed by: STUDENT IN AN ORGANIZED HEALTH CARE EDUCATION/TRAINING PROGRAM

## 2024-09-09 PROCEDURE — 99213 OFFICE O/P EST LOW 20 MIN: CPT | Performed by: STUDENT IN AN ORGANIZED HEALTH CARE EDUCATION/TRAINING PROGRAM

## 2024-09-09 PROCEDURE — 3078F DIAST BP <80 MM HG: CPT | Performed by: STUDENT IN AN ORGANIZED HEALTH CARE EDUCATION/TRAINING PROGRAM

## 2024-09-09 ASSESSMENT — FIBROSIS 4 INDEX: FIB4 SCORE: 0.85

## 2024-09-09 ASSESSMENT — PATIENT HEALTH QUESTIONNAIRE - PHQ9: CLINICAL INTERPRETATION OF PHQ2 SCORE: 0

## 2024-09-09 NOTE — PROGRESS NOTES
Chief Complaint   Patient presents with    Pharyngitis     More than x1 week   Painful swallowing    Cough     Few days ago        HPI: This is a 39 y.o. patient has 7 days of sore throat, cough, report mild congestion. She reports  runny nose. She denies ear fullness. No abnormal shortness of breath. No nausea vomiting or diarrhea. Mild subjective fever and chills. denies sick exposures. No coughing up blood. No headache.  Patient has taken over-the-counter analgesics and decongestant with relief.    She present today for persistent pharyngitis x 7 days.   - she has been taking halls, ricola  - overall she feel pain is slowly improving  - she does report itchy throat.   - she is unclear if she has hx of allergy.       ROS:  No fever, cough, nausea, changes in bowel movements or skin rash.      I reviewed the patient's medications, allergies and medical history:  Current Outpatient Medications   Medication Sig Dispense Refill    vitamin D2, Ergocalciferol, (DRISDOL) 1.25 MG (39654 UT) Cap capsule Take 1 Capsule by mouth every 7 days. 12 Capsule 0    levothyroxine (SYNTHROID) 88 MCG Tab Take 1 Tablet by mouth every morning on an empty stomach. 90 Tablet 1    sertraline (ZOLOFT) 25 MG tablet Take 1 Tablet by mouth every day. 30 Tablet 3    metFORMIN (GLUCOPHAGE) 500 MG Tab Take 1 Tablet by mouth 2 times a day with meals. 180 Tablet 1    Cholecalciferol (VITAMIN D) 2000 UNIT Tab Take  by mouth every day.       No current facility-administered medications for this visit.     Patient has no known allergies.  Past Medical History:   Diagnosis Date    Anxiety     PCOS (polycystic ovarian syndrome)     Positive PPD     Thyroid disease         EXAM:  /60 (BP Location: Left arm)   Pulse 86   Temp 36.4 °C (97.5 °F)   Ht 1.524 m (5')   Wt 70.9 kg (156 lb 4.9 oz)   SpO2 98%   General: NAD, non-toxic appearance.  Eyes: PERRL, conjunctiva slightly injected, no photophobia or eye discharge.  Nares: Patent with thin,  clear mucus.  Throat: Erythematous injection without enlarged tonsils.   Neck: Supple, with shotty anterior cervical lymphadenopathy.  Lungs: Good air entry bilaterally, clear to auscultation. No wheeze, rhonchi or crackles. Normal respiratory effort.  Heart: Regular rate without murmur.  Abdomen: Soft and non-tender. No hepatosplenomegaly.  Skin: Warm and dry. No rash.     Results for orders placed or performed in visit on 09/09/24   POCT Cepheid Group A Strep - PCR   Result Value Ref Range    POC Group A Strep, PCR Not Detected Not Detected, Invalid   POCT Cepheid CoV-2, Flu A/B, RSV - PCR   Result Value Ref Range    SARS-CoV-2 by PCR Negative Negative, Invalid    Influenza virus A RNA Negative Negative, Invalid    Influenza virus B, PCR Negative Negative, Invalid    RSV, PCR Negative Negative, Invalid         ASSESSMENT:   1. Pharyngitis, unspecified etiology    2. Acute pharyngitis, unspecified etiology     - poc covid, flu, rsv, strep negative     PLAN:  1. Discussed benign nature of viral upper respiratory infections.  2. OTC anti-pyretics and decongestants as needed. Supportive care advised.  3. Follow-up in office or urgent care for worsening symptoms, difficulty breathing, lack of expected recovery, or should new symptoms or problems arise.

## 2024-09-24 ENCOUNTER — HOSPITAL ENCOUNTER (OUTPATIENT)
Facility: MEDICAL CENTER | Age: 39
End: 2024-09-24
Attending: OBSTETRICS & GYNECOLOGY
Payer: COMMERCIAL

## 2024-09-24 LAB — B-HCG SERPL-ACNC: 175 MIU/ML (ref 0–5)

## 2024-09-26 ENCOUNTER — HOSPITAL ENCOUNTER (OUTPATIENT)
Facility: MEDICAL CENTER | Age: 39
End: 2024-09-26
Attending: OBSTETRICS & GYNECOLOGY
Payer: COMMERCIAL

## 2024-09-26 LAB — B-HCG SERPL-ACNC: 316 MIU/ML (ref 0–5)

## 2024-10-18 ENCOUNTER — IMMUNIZATION (OUTPATIENT)
Dept: OCCUPATIONAL MEDICINE | Facility: CLINIC | Age: 39
End: 2024-10-18

## 2024-10-18 DIAGNOSIS — Z23 NEED FOR VACCINATION: Primary | ICD-10-CM

## 2024-10-18 PROCEDURE — 90656 IIV3 VACC NO PRSV 0.5 ML IM: CPT

## 2024-10-28 ENCOUNTER — HOSPITAL ENCOUNTER (OUTPATIENT)
Dept: LAB | Facility: MEDICAL CENTER | Age: 39
End: 2024-10-28
Attending: FAMILY MEDICINE
Payer: COMMERCIAL

## 2024-10-28 DIAGNOSIS — E55.9 VITAMIN D INSUFFICIENCY: ICD-10-CM

## 2024-10-28 DIAGNOSIS — E03.9 HYPOTHYROIDISM (ACQUIRED): ICD-10-CM

## 2024-10-28 LAB
25(OH)D3 SERPL-MCNC: 50 NG/ML (ref 30–100)
TSH SERPL DL<=0.005 MIU/L-ACNC: 2.05 UIU/ML (ref 0.38–5.33)

## 2024-10-28 PROCEDURE — 82306 VITAMIN D 25 HYDROXY: CPT

## 2024-10-28 PROCEDURE — 36415 COLL VENOUS BLD VENIPUNCTURE: CPT

## 2024-10-28 PROCEDURE — 84443 ASSAY THYROID STIM HORMONE: CPT

## 2024-10-30 ENCOUNTER — TELEMEDICINE (OUTPATIENT)
Dept: MEDICAL GROUP | Facility: IMAGING CENTER | Age: 39
End: 2024-10-30
Payer: COMMERCIAL

## 2024-10-30 VITALS — TEMPERATURE: 97.5 F | HEIGHT: 60 IN | WEIGHT: 156 LBS | BODY MASS INDEX: 30.63 KG/M2

## 2024-10-30 DIAGNOSIS — E03.9 HYPOTHYROIDISM (ACQUIRED): ICD-10-CM

## 2024-10-30 DIAGNOSIS — E28.2 PCOS (POLYCYSTIC OVARIAN SYNDROME): ICD-10-CM

## 2024-10-30 DIAGNOSIS — Z3A.09 9 WEEKS GESTATION OF PREGNANCY: ICD-10-CM

## 2024-10-30 DIAGNOSIS — E55.9 VITAMIN D INSUFFICIENCY: ICD-10-CM

## 2024-10-30 DIAGNOSIS — F32.81 PMDD (PREMENSTRUAL DYSPHORIC DISORDER): ICD-10-CM

## 2024-10-30 RX ORDER — ERGOCALCIFEROL 1.25 MG/1
50000 CAPSULE, LIQUID FILLED ORAL
Qty: 12 CAPSULE | Refills: 0 | Status: CANCELLED
Start: 2024-10-30

## 2024-10-30 RX ORDER — LEVOTHYROXINE SODIUM 88 UG/1
88 TABLET ORAL
Qty: 90 TABLET | Refills: 3 | Status: SHIPPED | OUTPATIENT
Start: 2024-10-30

## 2024-10-30 ASSESSMENT — FIBROSIS 4 INDEX: FIB4 SCORE: 0.85

## 2024-11-11 ENCOUNTER — HOSPITAL ENCOUNTER (OUTPATIENT)
Dept: LAB | Facility: MEDICAL CENTER | Age: 39
End: 2024-11-11
Attending: OBSTETRICS & GYNECOLOGY
Payer: COMMERCIAL

## 2024-11-11 LAB
ABO GROUP BLD: NORMAL
BASOPHILS # BLD AUTO: 0.4 % (ref 0–1.8)
BASOPHILS # BLD: 0.03 K/UL (ref 0–0.12)
BLD GP AB SCN SERPL QL: NORMAL
EOSINOPHIL # BLD AUTO: 0.12 K/UL (ref 0–0.51)
EOSINOPHIL NFR BLD: 1.4 % (ref 0–6.9)
ERYTHROCYTE [DISTWIDTH] IN BLOOD BY AUTOMATED COUNT: 41.1 FL (ref 35.9–50)
HBV SURFACE AG SER QL: ABNORMAL
HCT VFR BLD AUTO: 45.5 % (ref 37–47)
HCV AB SER QL: ABNORMAL
HGB BLD-MCNC: 14.7 G/DL (ref 12–16)
HIV 1+2 AB+HIV1 P24 AG SERPL QL IA: NORMAL
IMM GRANULOCYTES # BLD AUTO: 0.02 K/UL (ref 0–0.11)
IMM GRANULOCYTES NFR BLD AUTO: 0.2 % (ref 0–0.9)
LYMPHOCYTES # BLD AUTO: 2.06 K/UL (ref 1–4.8)
LYMPHOCYTES NFR BLD: 24.3 % (ref 22–41)
MCH RBC QN AUTO: 26.7 PG (ref 27–33)
MCHC RBC AUTO-ENTMCNC: 32.3 G/DL (ref 32.2–35.5)
MCV RBC AUTO: 82.7 FL (ref 81.4–97.8)
MONOCYTES # BLD AUTO: 0.4 K/UL (ref 0–0.85)
MONOCYTES NFR BLD AUTO: 4.7 % (ref 0–13.4)
NEUTROPHILS # BLD AUTO: 5.85 K/UL (ref 1.82–7.42)
NEUTROPHILS NFR BLD: 69 % (ref 44–72)
NRBC # BLD AUTO: 0 K/UL
NRBC BLD-RTO: 0 /100 WBC (ref 0–0.2)
PLATELET # BLD AUTO: 234 K/UL (ref 164–446)
PMV BLD AUTO: 14 FL (ref 9–12.9)
RBC # BLD AUTO: 5.5 M/UL (ref 4.2–5.4)
RH BLD: NORMAL
RUBV AB SER QL: 125 IU/ML
T PALLIDUM AB SER QL IA: ABNORMAL
WBC # BLD AUTO: 8.5 K/UL (ref 4.8–10.8)

## 2024-11-11 PROCEDURE — 86780 TREPONEMA PALLIDUM: CPT

## 2024-11-11 PROCEDURE — 86592 SYPHILIS TEST NON-TREP QUAL: CPT

## 2024-11-11 PROCEDURE — 87086 URINE CULTURE/COLONY COUNT: CPT

## 2024-11-11 PROCEDURE — 86901 BLOOD TYPING SEROLOGIC RH(D): CPT

## 2024-11-11 PROCEDURE — 86900 BLOOD TYPING SEROLOGIC ABO: CPT

## 2024-11-11 PROCEDURE — 87340 HEPATITIS B SURFACE AG IA: CPT

## 2024-11-11 PROCEDURE — 85025 COMPLETE CBC W/AUTO DIFF WBC: CPT

## 2024-11-11 PROCEDURE — 36415 COLL VENOUS BLD VENIPUNCTURE: CPT

## 2024-11-11 PROCEDURE — 86850 RBC ANTIBODY SCREEN: CPT

## 2024-11-11 PROCEDURE — 86762 RUBELLA ANTIBODY: CPT

## 2024-11-11 PROCEDURE — 87389 HIV-1 AG W/HIV-1&-2 AB AG IA: CPT

## 2024-11-11 PROCEDURE — 86803 HEPATITIS C AB TEST: CPT

## 2024-11-13 LAB
BACTERIA UR CULT: NORMAL
SIGNIFICANT IND 70042: NORMAL
SITE SITE: NORMAL
SOURCE SOURCE: NORMAL

## 2024-11-15 ENCOUNTER — HOSPITAL ENCOUNTER (OUTPATIENT)
Dept: LAB | Facility: MEDICAL CENTER | Age: 39
End: 2024-11-15
Attending: OBSTETRICS & GYNECOLOGY
Payer: COMMERCIAL

## 2024-11-15 PROCEDURE — 87491 CHLMYD TRACH DNA AMP PROBE: CPT

## 2024-11-15 PROCEDURE — 87591 N.GONORRHOEAE DNA AMP PROB: CPT

## 2024-11-16 LAB
C TRACH DNA SPEC QL NAA+PROBE: NEGATIVE
N GONORRHOEA DNA SPEC QL NAA+PROBE: NEGATIVE
SPECIMEN SOURCE: NORMAL

## 2024-12-03 ENCOUNTER — HOSPITAL ENCOUNTER (OUTPATIENT)
Dept: LAB | Facility: MEDICAL CENTER | Age: 39
End: 2024-12-03
Attending: FAMILY MEDICINE
Payer: COMMERCIAL

## 2024-12-03 DIAGNOSIS — E03.9 HYPOTHYROIDISM (ACQUIRED): ICD-10-CM

## 2024-12-03 DIAGNOSIS — E55.9 VITAMIN D INSUFFICIENCY: ICD-10-CM

## 2024-12-03 PROCEDURE — 82306 VITAMIN D 25 HYDROXY: CPT

## 2024-12-03 PROCEDURE — 36415 COLL VENOUS BLD VENIPUNCTURE: CPT

## 2024-12-03 PROCEDURE — 84443 ASSAY THYROID STIM HORMONE: CPT

## 2024-12-04 LAB
25(OH)D3 SERPL-MCNC: 39 NG/ML (ref 30–100)
TSH SERPL DL<=0.005 MIU/L-ACNC: 1.24 UIU/ML (ref 0.38–5.33)

## 2024-12-05 ENCOUNTER — OFFICE VISIT (OUTPATIENT)
Dept: MEDICAL GROUP | Facility: IMAGING CENTER | Age: 39
End: 2024-12-05
Payer: COMMERCIAL

## 2024-12-05 ENCOUNTER — APPOINTMENT (OUTPATIENT)
Dept: MEDICAL GROUP | Facility: IMAGING CENTER | Age: 39
End: 2024-12-05
Payer: COMMERCIAL

## 2024-12-05 VITALS
DIASTOLIC BLOOD PRESSURE: 76 MMHG | OXYGEN SATURATION: 98 % | HEART RATE: 94 BPM | BODY MASS INDEX: 30.86 KG/M2 | TEMPERATURE: 97.7 F | HEIGHT: 60 IN | RESPIRATION RATE: 18 BRPM | SYSTOLIC BLOOD PRESSURE: 104 MMHG | WEIGHT: 157.2 LBS

## 2024-12-05 DIAGNOSIS — E55.9 VITAMIN D INSUFFICIENCY: ICD-10-CM

## 2024-12-05 DIAGNOSIS — E03.9 HYPOTHYROIDISM (ACQUIRED): ICD-10-CM

## 2024-12-05 DIAGNOSIS — Z86.32 HISTORY OF GESTATIONAL DIABETES MELLITUS (GDM): ICD-10-CM

## 2024-12-05 DIAGNOSIS — R09.81 COMPLAINT OF NASAL CONGESTION: ICD-10-CM

## 2024-12-05 DIAGNOSIS — D22.5 ATYPICAL NEVUS OF ABDOMINAL WALL: ICD-10-CM

## 2024-12-05 DIAGNOSIS — Z34.90 PREGNANCY, UNSPECIFIED GESTATIONAL AGE: ICD-10-CM

## 2024-12-05 LAB
FLUAV RNA SPEC QL NAA+PROBE: NEGATIVE
FLUBV RNA SPEC QL NAA+PROBE: NEGATIVE
RSV RNA SPEC QL NAA+PROBE: NEGATIVE
SARS-COV-2 RNA RESP QL NAA+PROBE: NEGATIVE

## 2024-12-05 PROCEDURE — 99214 OFFICE O/P EST MOD 30 MIN: CPT | Performed by: FAMILY MEDICINE

## 2024-12-05 PROCEDURE — 3078F DIAST BP <80 MM HG: CPT | Performed by: FAMILY MEDICINE

## 2024-12-05 PROCEDURE — 0241U POCT CEPHEID COV-2, FLU A/B, RSV - PCR: CPT | Performed by: FAMILY MEDICINE

## 2024-12-05 PROCEDURE — 3074F SYST BP LT 130 MM HG: CPT | Performed by: FAMILY MEDICINE

## 2024-12-05 ASSESSMENT — FIBROSIS 4 INDEX: FIB4 SCORE: 0.83

## 2024-12-05 NOTE — PROGRESS NOTES
SUBJECTIVE:    Chief Complaint   Patient presents with    Follow-Up     Lab 12/3    Cough     X 2 days   Sneezing, stuffy nose and congestion  Pt daughter sick last Friday. Pt report daughter tested positive for flu x 2 days ago        HPI:     Milli Santizo is a 39 y.o. female here for lab review and cough symptoms.  2 days of symptoms. No sore throat.   Sneezing, stuffiness and congestion. Slight cough. A little phlegm.  Has been taking Tylenol as needed.  She is currently pregnant.  She is taking a prenatal vitamin.    Add vitamin D 5000 IU once a week.  Levothyroxine 88 mcg daily.  Gynecologist recommended metformin 1000 mg daily.  First pregnancy she had gestational diabetes.  Recommended to see dermatology for a skin lesion on her abdomen.    ROS:  No recent fevers or chills. No nausea or vomiting. No diarrhea. No chest pains or shortness of breath. No lower extremity edema.    Current Outpatient Medications on File Prior to Visit   Medication Sig Dispense Refill    levothyroxine (SYNTHROID) 88 MCG Tab Take 1 Tablet by mouth every morning on an empty stomach. 90 Tablet 3    Non Formulary Request Prenatal vitamin      vitamin D2, Ergocalciferol, (DRISDOL) 1.25 MG (70735 UT) Cap capsule Take 1 Capsule by mouth every 7 days. 12 Capsule 0    metFORMIN (GLUCOPHAGE) 500 MG Tab Take 1 Tablet by mouth 2 times a day with meals. 180 Tablet 1    sertraline (ZOLOFT) 25 MG tablet Take 1 Tablet by mouth every day. 30 Tablet 3    Cholecalciferol (VITAMIN D) 2000 UNIT Tab Take  by mouth every day.       No current facility-administered medications on file prior to visit.       No Known Allergies    Patient Active Problem List    Diagnosis Date Noted    SHIMON (obstructive sleep apnea) 10/18/2022    Impaired fasting glucose 04/28/2021    Hypothyroidism due to Hashimoto's thyroiditis 04/28/2021    History of hypothyroidism 09/23/2020    Lactation problem 01/24/2020    Lactation suppression 01/24/2020    Slow transit  constipation 08/08/2019    Normocytic anemia 08/08/2019    Vitamin D insufficiency 08/08/2019    Chronic fatigue 04/23/2019    PCOS (polycystic ovarian syndrome) 04/26/2018    Meralgia paresthetica of right side 06/14/2017    Generalized anxiety disorder 06/14/2017    Hypothyroidism (acquired) 04/05/2017    Acne vulgaris 04/05/2017    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    Family planning 09/05/2013    Positive PPD        Past Medical History:   Diagnosis Date    Anxiety     PCOS (polycystic ovarian syndrome)     Positive PPD     Thyroid disease          OBJECTIVE:   /76 (BP Location: Left arm, Patient Position: Sitting, BP Cuff Size: Adult)   Pulse 94   Temp 36.5 °C (97.7 °F) (Temporal)   Resp 18   Ht 1.524 m (5')   Wt 71.3 kg (157 lb 3.2 oz)   LMP 08/24/2024 Comment: Due 6/1/2025  SpO2 98%   BMI 30.70 kg/m²   General: Well-developed well-nourished female, no acute distress  HEENT: oropharynx clear, eyes clear, TMs clear and intact, nose clear  Neck: supple, no lymphadenopathy- cervical or supraclavicular, no thyromegaly  Cardiovascular: regular rate and rhythm, no murmurs, gallops, rubs  Lungs: clear to auscultation bilaterally, no wheezes, crackles, or rhonchi  Abdomen: +bowel sounds, soft, nontender, nondistended, no rebound, no guarding, no hepatosplenomegaly  Extremities: no cyanosis, clubbing, edema  Skin: Warm and dry.  Mid abdominal region with somewhat star-shaped hyperpigmented lesion ~1 cm.   Psych: appropriate mood and affect     Latest Reference Range & Units 12/03/24 10:01   25-Hydroxy   Vitamin D 25 30 - 100 ng/mL 39   TSH 0.380 - 5.330 uIU/mL 1.240       ASSESSMENT/PLAN:    39 y.o.female       1. Complaint of nasal congestion - allergy vs start of URI.   Reviewed recommendations for management of symptoms.  Consider use of saline nasal spray as needed.  Recommend precautions with medication use due to pregnancy.  Monitor.  Follow-up as needed if no further improvements. POCT CoV-2, Flu  A/B, RSV by PCR      2. History of gestational diabetes mellitus (GDM)   She will continue with metformin therapy.  Continue healthy diet and routine exercise as tolerated.  Follow-up with OB routinely.       3. Pregnancy, unspecified gestational age   Continue prenatal vitamin.  Continue routine follow-up with OB.       4. Hypothyroidism (acquired)-stable.  Continue on levothyroxine 88 mcg daily.  Monitor in future. TSH WITH REFLEX TO FT4      5. Atypical nevus of abdominal wall   Referral to dermatology. Referral to Dermatology      6.      Vitamin D insufficiency-stable labs.  She will continue prenatal vitamin and extra vitamin D weekly.      Follow-up in 3 to 4 months.    This medical record contains text that has been entered with the assistance of computer voice recognition and dictation software.  Therefore, it may contain unintended errors in text, spelling, punctuation, or grammar.

## 2024-12-20 ENCOUNTER — HOSPITAL ENCOUNTER (OUTPATIENT)
Dept: LAB | Facility: MEDICAL CENTER | Age: 39
End: 2024-12-20
Attending: OBSTETRICS & GYNECOLOGY
Payer: COMMERCIAL

## 2024-12-20 PROCEDURE — 82105 ALPHA-FETOPROTEIN SERUM: CPT

## 2024-12-20 PROCEDURE — 36415 COLL VENOUS BLD VENIPUNCTURE: CPT

## 2024-12-23 LAB
# FETUSES US: NORMAL
AFP MOM SERPL: 0.76
AFP SERPL-MCNC: 28 NG/ML
AGE - REPORTED: 37.9 YR
CURRENT SMOKER: NO
FAMILY MEMBER DISEASES HX: NO
GA METHOD: NORMAL
GA: NORMAL WK
IDDM PATIENT QL: NO
INTEGRATED SCN PATIENT-IMP: NORMAL
SPECIMEN DRAWN SERPL: NORMAL

## 2024-12-27 ENCOUNTER — OFFICE VISIT (OUTPATIENT)
Dept: URGENT CARE | Facility: PHYSICIAN GROUP | Age: 39
End: 2024-12-27
Payer: COMMERCIAL

## 2024-12-27 VITALS
TEMPERATURE: 98.1 F | OXYGEN SATURATION: 99 % | HEART RATE: 76 BPM | HEIGHT: 60 IN | BODY MASS INDEX: 31.08 KG/M2 | SYSTOLIC BLOOD PRESSURE: 110 MMHG | DIASTOLIC BLOOD PRESSURE: 80 MMHG | RESPIRATION RATE: 16 BRPM | WEIGHT: 158.29 LBS

## 2024-12-27 DIAGNOSIS — Z3A.17 17 WEEKS GESTATION OF PREGNANCY: ICD-10-CM

## 2024-12-27 DIAGNOSIS — S86.912A STRAIN OF LEFT KNEE, INITIAL ENCOUNTER: ICD-10-CM

## 2024-12-27 PROCEDURE — 99214 OFFICE O/P EST MOD 30 MIN: CPT | Performed by: PHYSICIAN ASSISTANT

## 2024-12-27 PROCEDURE — 3074F SYST BP LT 130 MM HG: CPT | Performed by: PHYSICIAN ASSISTANT

## 2024-12-27 PROCEDURE — 3079F DIAST BP 80-89 MM HG: CPT | Performed by: PHYSICIAN ASSISTANT

## 2024-12-27 PROCEDURE — 1125F AMNT PAIN NOTED PAIN PRSNT: CPT | Performed by: PHYSICIAN ASSISTANT

## 2024-12-27 ASSESSMENT — ENCOUNTER SYMPTOMS
FALLS: 0
NEUROLOGICAL NEGATIVE: 1
CARDIOVASCULAR NEGATIVE: 1
RESPIRATORY NEGATIVE: 1
CONSTITUTIONAL NEGATIVE: 1

## 2024-12-27 ASSESSMENT — FIBROSIS 4 INDEX: FIB4 SCORE: 0.83

## 2024-12-27 ASSESSMENT — PAIN SCALES - GENERAL: PAINLEVEL_OUTOF10: 7=MODERATE-SEVERE PAIN

## 2024-12-27 NOTE — PROGRESS NOTES
Subjective     Milli Santizo is a very pleasant 39 y.o. female who presents with Knee Pain (Lt knee pain x4d)            HPI  Swelling and tenderness of the anterior superior left knee above the kneecap.  There was no injury fall or trip to the event.  No redness, rash, warmth, ecchymosis.  No systemic symptoms.  Patient is currently 17 weeks pregnant.  OTC meds and conservative measures have been attempted with limited relief.      PMH:  has a past medical history of Anxiety, PCOS (polycystic ovarian syndrome), Positive PPD, and Thyroid disease.    She has no past medical history of Alcohol abuse, Alcoholism (HCC), Depression, Psychosis (HCC), or Substance abuse ([facility]).  MEDS:   Current Outpatient Medications:     levothyroxine (SYNTHROID) 88 MCG Tab, Take 1 Tablet by mouth every morning on an empty stomach., Disp: 90 Tablet, Rfl: 3    vitamin D2, Ergocalciferol, (DRISDOL) 1.25 MG (11954 UT) Cap capsule, Take 1 Capsule by mouth every 7 days., Disp: 12 Capsule, Rfl: 0    metFORMIN (GLUCOPHAGE) 500 MG Tab, Take 1 Tablet by mouth 2 times a day with meals., Disp: 180 Tablet, Rfl: 1    Non Formulary Request, Prenatal vitamin, Disp: , Rfl:   ALLERGIES: No Known Allergies  SURGHX:   Past Surgical History:   Procedure Laterality Date    [place]  DELIVERY ONLY N/A 2019    Procedure:  SECTION, PRIMARY;  Surgeon: Nadira Abrams M.D.;  Location: LABOR AND DELIVERY;  Service: Labor and Delivery     SOCHX:  reports that she has never smoked. She has never used smokeless tobacco. She reports that she does not currently use alcohol after a past usage of about 0.6 oz of alcohol per week. She reports that she does not use drugs.  FH: family history includes Arthritis in her maternal grandmother and mother; Cancer in her maternal grandmother; Hypertension in her father and paternal grandmother; Thyroid in her mother.    Review of Systems   Constitutional: Negative.    Respiratory: Negative.     Cardiovascular:  Negative.    Musculoskeletal:  Positive for joint pain. Negative for falls.   Neurological: Negative.        Medications, Allergies, and current problem list reviewed today in Epic           Objective     /80   Pulse 76   Temp 36.7 °C (98.1 °F) (Temporal)   Resp 16   Ht 1.524 m (5')   Wt 71.8 kg (158 lb 4.6 oz)   LMP 08/24/2024 Comment: Due 6/1/2025  SpO2 99%   BMI 30.91 kg/m²      Physical Exam  Vitals and nursing note reviewed.   Constitutional:       General: She is not in acute distress.     Appearance: Normal appearance. She is well-developed. She is not ill-appearing, toxic-appearing or diaphoretic.   HENT:      Head: Normocephalic and atraumatic.      Right Ear: Hearing and external ear normal.      Left Ear: Hearing and external ear normal.      Nose: Nose normal.   Eyes:      General:         Right eye: No discharge.         Left eye: No discharge.      Conjunctiva/sclera: Conjunctivae normal.   Cardiovascular:      Rate and Rhythm: Normal rate and regular rhythm.      Heart sounds: Normal heart sounds.   Pulmonary:      Effort: Pulmonary effort is normal. No respiratory distress.      Breath sounds: Normal breath sounds. No wheezing.   Musculoskeletal:      Cervical back: Normal range of motion and neck supple.      Left knee: Swelling present. No deformity, effusion, erythema, ecchymosis, bony tenderness or crepitus. Normal range of motion. Tenderness present over the patellar tendon. Normal alignment.        Legs:    Skin:     General: Skin is warm and dry.   Neurological:      General: No focal deficit present.      Mental Status: She is alert and oriented to person, place, and time.   Psychiatric:         Mood and Affect: Mood normal.         Behavior: Behavior normal.         Thought Content: Thought content normal.         Judgment: Judgment normal.                             Assessment & Plan        Assessment & Plan  Strain of left knee, initial encounter  This is a very pleasant  39-year-old female presenting with anterior superior left knee pain around the patella with swelling.  No traumatic injury fall or precipitating event.  No erythema, ecchymosis or systemic symptoms.  Currently 17 weeks pregnant.  Vital signs normal.  Exam shows soft tissue swelling tenderness of the patella tendon.  Patellar tracking normal without crepitus.  Knee is stable and special tests are negative.  As there was no traumatic injury, fall or gross deformity we will defer x-ray at this time as patient is pregnant.  She was given a knee wrap and recommended to buy a neoprene knee sleeve.  She is to use ice, topical remedies, stretching and Tylenol.       17 weeks gestation of pregnancy               I personally reviewed prior external notes and test results pertinent to today's visit. Return to clinic or go to ED if symptoms worsen or persist. Red flag symptoms and indications for ED discussed at length. Patient/Parent/Guardian voices understanding.  AVS with post-visit instructions printed and provided or given verbally.  Follow-up with your primary care provider in 3-5 days. All side effects and potential interactions of prescribed medication discussed including allergic response, GI upset, tendon injury, rash, sedation, OCP effectiveness, etc.    Please note that this dictation was created using voice recognition software. I have made every reasonable attempt to correct obvious errors, but I expect that there are errors of grammar and possibly content that I did not discover before finalizing the note.

## 2025-02-12 ENCOUNTER — HOSPITAL ENCOUNTER (OUTPATIENT)
Facility: MEDICAL CENTER | Age: 40
End: 2025-02-12
Attending: OBSTETRICS & GYNECOLOGY
Payer: COMMERCIAL

## 2025-02-12 LAB
GLUCOSE 1H P 50 G GLC PO SERPL-MCNC: 140 MG/DL (ref 70–139)
HCT VFR BLD AUTO: 38.6 % (ref 37–47)
HGB BLD-MCNC: 12.7 G/DL (ref 12–16)
PLATELET # BLD AUTO: 204 K/UL (ref 164–446)
T PALLIDUM AB SER QL IA: NORMAL

## 2025-02-12 PROCEDURE — 85049 AUTOMATED PLATELET COUNT: CPT

## 2025-02-12 PROCEDURE — 85018 HEMOGLOBIN: CPT

## 2025-02-12 PROCEDURE — 85014 HEMATOCRIT: CPT

## 2025-02-12 PROCEDURE — 36415 COLL VENOUS BLD VENIPUNCTURE: CPT

## 2025-02-12 PROCEDURE — 82950 GLUCOSE TEST: CPT

## 2025-02-12 PROCEDURE — 86780 TREPONEMA PALLIDUM: CPT

## 2025-02-22 ENCOUNTER — HOSPITAL ENCOUNTER (OUTPATIENT)
Dept: LAB | Facility: MEDICAL CENTER | Age: 40
End: 2025-02-22
Attending: OBSTETRICS & GYNECOLOGY
Payer: COMMERCIAL

## 2025-02-22 LAB
GLUCOSE 1H P CHAL SERPL-MCNC: 220 MG/DL (ref 65–180)
GLUCOSE 2H P CHAL SERPL-MCNC: 182 MG/DL (ref 65–155)
GLUCOSE 3H P CHAL SERPL-MCNC: 135 MG/DL (ref 65–140)
GLUCOSE BS SERPL-MCNC: 88 MG/DL (ref 65–95)

## 2025-02-22 PROCEDURE — 82951 GLUCOSE TOLERANCE TEST (GTT): CPT

## 2025-02-22 PROCEDURE — 82952 GTT-ADDED SAMPLES: CPT

## 2025-02-22 PROCEDURE — 36415 COLL VENOUS BLD VENIPUNCTURE: CPT

## 2025-03-11 ENCOUNTER — HOSPITAL ENCOUNTER (OUTPATIENT)
Dept: LAB | Facility: MEDICAL CENTER | Age: 40
End: 2025-03-11
Attending: OBSTETRICS & GYNECOLOGY
Payer: COMMERCIAL

## 2025-03-11 LAB — BLD GP AB SCN SERPL QL: NORMAL

## 2025-03-11 PROCEDURE — 36415 COLL VENOUS BLD VENIPUNCTURE: CPT

## 2025-03-11 PROCEDURE — 86850 RBC ANTIBODY SCREEN: CPT

## 2025-04-08 ENCOUNTER — HOSPITAL ENCOUNTER (OUTPATIENT)
Dept: LAB | Facility: MEDICAL CENTER | Age: 40
End: 2025-04-08
Attending: FAMILY MEDICINE
Payer: COMMERCIAL

## 2025-04-08 DIAGNOSIS — E03.9 HYPOTHYROIDISM (ACQUIRED): ICD-10-CM

## 2025-04-08 LAB — TSH SERPL DL<=0.005 MIU/L-ACNC: 1.29 UIU/ML (ref 0.38–5.33)

## 2025-04-08 PROCEDURE — 36415 COLL VENOUS BLD VENIPUNCTURE: CPT

## 2025-04-08 PROCEDURE — 84443 ASSAY THYROID STIM HORMONE: CPT

## 2025-04-10 ENCOUNTER — APPOINTMENT (OUTPATIENT)
Dept: MEDICAL GROUP | Facility: IMAGING CENTER | Age: 40
End: 2025-04-10
Payer: COMMERCIAL

## 2025-04-10 VITALS
OXYGEN SATURATION: 97 % | HEART RATE: 98 BPM | SYSTOLIC BLOOD PRESSURE: 104 MMHG | TEMPERATURE: 97.4 F | WEIGHT: 172.6 LBS | HEIGHT: 60 IN | RESPIRATION RATE: 16 BRPM | DIASTOLIC BLOOD PRESSURE: 80 MMHG | BODY MASS INDEX: 33.89 KG/M2

## 2025-04-10 DIAGNOSIS — O24.415 GESTATIONAL DIABETES MELLITUS (GDM) IN THIRD TRIMESTER CONTROLLED ON ORAL HYPOGLYCEMIC DRUG: ICD-10-CM

## 2025-04-10 DIAGNOSIS — E03.9 HYPOTHYROIDISM (ACQUIRED): ICD-10-CM

## 2025-04-10 DIAGNOSIS — Z34.90 PREGNANCY, UNSPECIFIED GESTATIONAL AGE: ICD-10-CM

## 2025-04-10 DIAGNOSIS — R45.89 MOODINESS: ICD-10-CM

## 2025-04-10 PROCEDURE — 3079F DIAST BP 80-89 MM HG: CPT | Performed by: FAMILY MEDICINE

## 2025-04-10 PROCEDURE — 3074F SYST BP LT 130 MM HG: CPT | Performed by: FAMILY MEDICINE

## 2025-04-10 PROCEDURE — 99214 OFFICE O/P EST MOD 30 MIN: CPT | Performed by: FAMILY MEDICINE

## 2025-04-10 ASSESSMENT — FIBROSIS 4 INDEX: FIB4 SCORE: 0.96

## 2025-04-10 NOTE — PROGRESS NOTES
SUBJECTIVE:    Chief Complaint   Patient presents with    Follow-Up     Lab results       HPI:     Milli Santizo is a 39 y.o. female with hypothyroidism here for follow-up of lab results.  She is taking levothyroxine 88 mcg daily.  Tolerating well.  She is currently 32 weeks pregnant.  Finds that she has been gerardo.  Has stressors and thus may get overwhelmed.  She is interested in considering medication therapy in future for mood management. Hx of anxiety/depression.   Notes she has been diagnosed with GDM.  She is currently taking metformin.  Monitoring her glucose.  She is on prenatal vitamin.  Scheduled for  May 28.  Notes that she is interested and going on    ROS:  No recent fevers or chills. No nausea or vomiting. No diarrhea. No chest pains or shortness of breath. No lower extremity edema.    Current Outpatient Medications on File Prior to Visit   Medication Sig Dispense Refill    levothyroxine (SYNTHROID) 88 MCG Tab Take 1 Tablet by mouth every morning on an empty stomach. 90 Tablet 3    Non Formulary Request Prenatal vitamin      metFORMIN (GLUCOPHAGE) 500 MG Tab Take 1 Tablet by mouth 2 times a day with meals. 180 Tablet 1    vitamin D2, Ergocalciferol, (DRISDOL) 1.25 MG (21189 UT) Cap capsule Take 1 Capsule by mouth every 7 days. (Patient not taking: Reported on 4/10/2025) 12 Capsule 0     No current facility-administered medications on file prior to visit.       No Known Allergies    Patient Active Problem List    Diagnosis Date Noted    SHIMON (obstructive sleep apnea) 10/18/2022    Impaired fasting glucose 2021    Hypothyroidism due to Hashimoto's thyroiditis 2021    History of hypothyroidism 2020    Lactation problem 2020    Lactation suppression 2020    Slow transit constipation 2019    Normocytic anemia 2019    Vitamin D insufficiency 2019    Chronic fatigue 2019    PCOS (polycystic ovarian syndrome) 2018    Meralgia  paresthetica of right side 06/14/2017    Generalized anxiety disorder 06/14/2017    Hypothyroidism (acquired) 04/05/2017    Acne vulgaris 04/05/2017    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    Family planning 09/05/2013    Positive PPD        Past Medical History:   Diagnosis Date    Anxiety     Gestational diabetes insipidus (HCC)     pt report x 1 mo ago    PCOS (polycystic ovarian syndrome)     Positive PPD     Thyroid disease          OBJECTIVE:   /80 (BP Location: Left arm, Patient Position: Sitting, BP Cuff Size: Adult)   Pulse 98   Temp 36.3 °C (97.4 °F) (Temporal)   Resp 16   Ht 1.524 m (5')   Wt 78.3 kg (172 lb 9.6 oz)   LMP 08/24/2024 Comment: Due 6/1/2025  SpO2 97%   BMI 33.71 kg/m²   General: Well-developed well-nourished female, no acute distress  Neck: supple, no lymphadenopathy- cervical or supraclavicular, no thyromegaly  Cardiovascular: regular rate and rhythm, no murmurs, gallops, rubs  Lungs: clear to auscultation bilaterally, no wheezes, crackles, or rhonchi  Abdomen: Gravid.  Extremities: no cyanosis, clubbing, edema  Skin: Warm and dry  Psych: appropriate mood and affect     Latest Reference Range & Units 04/08/25 11:40   TSH 0.380 - 5.330 uIU/mL 1.290         ASSESSMENT/PLAN:    39 y.o.female     1. Hypothyroidism (acquired) -stable.  Continue on levothyroxine 88 mcg daily.  Monitor in future.       2. Pregnancy, unspecified gestational age   Continue prenatal vitamins.    Continue routine follow-up with OB/Guynn.       3. Gestational diabetes mellitus (GDM) in third trimester controlled on oral hypoglycemic drug-stable.  She is on metformin therapy.  Continue healthy diet and routine exercise as tolerated.  Continue follow-up with OB/Guynn.       4. Moodiness counseled on current management symptoms and stress management.  Counseled on options of therapy for future. Monitor.            Return in about 3 months (around 7/10/2025).    This medical record contains text that has been  entered with the assistance of computer voice recognition and dictation software.  Therefore, it may contain unintended errors in text, spelling, punctuation, or grammar.

## 2025-05-01 ENCOUNTER — HOSPITAL ENCOUNTER (OUTPATIENT)
Facility: MEDICAL CENTER | Age: 40
End: 2025-05-01
Attending: OBSTETRICS & GYNECOLOGY
Payer: COMMERCIAL

## 2025-05-02 ENCOUNTER — APPOINTMENT (OUTPATIENT)
Dept: ADMISSIONS | Facility: MEDICAL CENTER | Age: 40
End: 2025-05-02
Attending: OBSTETRICS & GYNECOLOGY
Payer: COMMERCIAL

## 2025-05-02 LAB — GP B STREP DNA SPEC QL NAA+PROBE: NEGATIVE

## 2025-05-13 ENCOUNTER — PRE-ADMISSION TESTING (OUTPATIENT)
Dept: ADMISSIONS | Facility: MEDICAL CENTER | Age: 40
End: 2025-05-13
Attending: OBSTETRICS & GYNECOLOGY
Payer: COMMERCIAL

## 2025-05-21 NOTE — H&P
DATE OF ADMISSION:  2025     CHIEF COMPLAINT:  1.  A 38 5/7 week gestation.  2.  Diet-controlled gestational diabetes mellitus.  3.  Previous  section.  4.  preeclampsia     HISTORY OF PRESENT ILLNESS:  The patient is a 39-year-old lady  2, para   1.  Her ANDREA is 2025, so her EGA was 39 and 3/7 weeks at the time of the   Originally-scheduled repeat  section.  The indication for surgery is previous    section for fetal intolerance of labor.  She has been treated for   diet-controlled gestational diabetes mellitus with reasonable control.    Non-stress tests have been reassuring.  She is group B streptococcus negative.    Ultrasound at 33 weeks revealed normal fetal growth, 51st percentile at that   time.  Risks and benefits of surgery have been discussed.    ADDENDUM on 2025:  -156 /  today at 38 5/7 wks.  1+ edema.  She has significant proteinuria.  FHR Cat I.     PRENATAL CARE: Blood type is O negative.  She did receive antepartum RhoGAM.    Cell free-DNA screening was reassuring.  She had a [employer] 14 recessive gene   screen revealing that she is a silent carrier of alpha thalassemia.  Her    subsequently had the same screen and he is not a silent carrier.    She understands autosomal recessive ramifications.  Maternal serum   alpha-fetoprotein was reassuring.  Ultrasounds at 21 and 33 weeks revealed   normal fetal anatomy and growth with a right/anterior fundal placenta and no   evidence of invasive placenta.  She has been on prophylactic dose aspirin.    She is group B streptococcus negative.  She has been managing her gestational   diabetes with diet and a Stelo biosensor.  For the most part, she has had good   control with most of her fastings less than 100 and average glucose of 118.    All of her blood pressures were normal.  Total pregnancy weight gain was 25   pounds.     OBSTETRIC HISTORY:  1.  Primary low transverse  section in  2019 at 39 weeks' gestation   for fetal intolerance of labor.  She had an elevated gestational diabetes   screen, but a normal 3-hour glucose tolerance test during that pregnancy.  2.  Present pregnancy.     PAST MEDICAL HISTORY:  Positive for polycystic ovary syndrome, hypothyroidism,   anxiety.     MEDICATIONS:  1.  Prenatal vitamin daily.  2.  Metformin 1000 mg twice a day.  3.  Levothyroxine 88 mcg a day.  4.  Aspirin 81 mg a day.     ALLERGIES:  No known drug allergies.     SURGERIES:  Primary low transverse  section in 2019.     SOCIAL HISTORY:  She is a full-time mom.  She is  to Talon, a data    at [employer].  She denies alcohol, tobacco, or drug use.     PHYSICAL EXAMINATION:  VITAL SIGNS: Afebrile, /74.  Weight 182 pounds.  HEENT:  Normal.  LUNGS:  Clear to auscultation.  HEART:  Heart sounds normal.  ABDOMEN:  Nontender.  Fundal height is appropriate.  No hepatosplenomegaly.  EXTREMITIES:  Trace edema.  Homans negative.  DTRs are normal.     DIAGNOSES:  1.  A 39 and 3/7 week gestation.  2.  Diet controlled gestational diabetes.  3.  Previous  section.     PLAN:  Repeat low transverse  section.    _________________________________________    ADDENDUM:  -156 /  today at 38 5/7 wks.  1+ edema.  She has significant proteinuria.  FHR Cat I.    LAB:     25 13:51 25 14:05   WBC 8.4    Hemoglobin 13.7    Hematocrit 42.8    Platelet Count 172    Bun 6 (L)    Creatinine 0.48 (L)    GFR (CKD-EPI) 124    AST(SGOT) 23    ALT(SGPT) 18    Total Bilirubin 0.2    Urine Protein Creatinine Ratio  314 (H)     NEW DIAGNOSES:  1.  A 38 5/7 week gestation.  2.  Diet controlled gestational diabetes.  3.  Previous  section.  4.  Preeclampsia without severe features    PLAN:    Deliver today by repeat LTCS.      ______________________________  MD JAYDON Daley/GOMEZ    DD:  2025 12:57  DT:  2025 13:38    Job#:   [id number]

## 2025-05-23 ENCOUNTER — ANESTHESIA EVENT (OUTPATIENT)
Dept: OBGYN | Facility: MEDICAL CENTER | Age: 40
End: 2025-05-23
Payer: COMMERCIAL

## 2025-05-23 ENCOUNTER — ANESTHESIA (OUTPATIENT)
Dept: OBGYN | Facility: MEDICAL CENTER | Age: 40
End: 2025-05-23
Payer: COMMERCIAL

## 2025-05-23 ENCOUNTER — HOSPITAL ENCOUNTER (INPATIENT)
Facility: MEDICAL CENTER | Age: 40
LOS: 3 days | End: 2025-05-26
Attending: OBSTETRICS & GYNECOLOGY | Admitting: OBSTETRICS & GYNECOLOGY
Payer: COMMERCIAL

## 2025-05-23 DIAGNOSIS — G89.18 PAIN FOLLOWING SURGERY OR PROCEDURE: ICD-10-CM

## 2025-05-23 LAB
ALBUMIN SERPL BCP-MCNC: 3.5 G/DL (ref 3.2–4.9)
ALBUMIN/GLOB SERPL: 1.2 G/DL
ALP SERPL-CCNC: 137 U/L (ref 30–99)
ALT SERPL-CCNC: 18 U/L (ref 2–50)
ANION GAP SERPL CALC-SCNC: 13 MMOL/L (ref 7–16)
AST SERPL-CCNC: 23 U/L (ref 12–45)
BASOPHILS # BLD AUTO: 0.2 % (ref 0–1.8)
BASOPHILS # BLD: 0.02 K/UL (ref 0–0.12)
BILIRUB SERPL-MCNC: 0.2 MG/DL (ref 0.1–1.5)
BUN SERPL-MCNC: 6 MG/DL (ref 8–22)
CALCIUM ALBUM COR SERPL-MCNC: 9.4 MG/DL (ref 8.5–10.5)
CALCIUM SERPL-MCNC: 9 MG/DL (ref 8.5–10.5)
CHLORIDE SERPL-SCNC: 106 MMOL/L (ref 96–112)
CO2 SERPL-SCNC: 18 MMOL/L (ref 20–33)
CREAT SERPL-MCNC: 0.48 MG/DL (ref 0.5–1.4)
CREAT UR-MCNC: 19.4 MG/DL
EOSINOPHIL # BLD AUTO: 0.07 K/UL (ref 0–0.51)
EOSINOPHIL NFR BLD: 0.8 % (ref 0–6.9)
ERYTHROCYTE [DISTWIDTH] IN BLOOD BY AUTOMATED COUNT: 45.6 FL (ref 35.9–50)
GFR SERPLBLD CREATININE-BSD FMLA CKD-EPI: 124 ML/MIN/1.73 M 2
GLOBULIN SER CALC-MCNC: 3 G/DL (ref 1.9–3.5)
GLUCOSE SERPL-MCNC: 74 MG/DL (ref 65–99)
HCT VFR BLD AUTO: 42.8 % (ref 37–47)
HGB BLD-MCNC: 13.7 G/DL (ref 12–16)
HOLDING TUBE BB 8507: NORMAL
IMM GRANULOCYTES # BLD AUTO: 0.05 K/UL (ref 0–0.11)
IMM GRANULOCYTES NFR BLD AUTO: 0.6 % (ref 0–0.9)
LYMPHOCYTES # BLD AUTO: 2.17 K/UL (ref 1–4.8)
LYMPHOCYTES NFR BLD: 25.8 % (ref 22–41)
MCH RBC QN AUTO: 27.9 PG (ref 27–33)
MCHC RBC AUTO-ENTMCNC: 32 G/DL (ref 32.2–35.5)
MCV RBC AUTO: 87.2 FL (ref 81.4–97.8)
MONOCYTES # BLD AUTO: 0.67 K/UL (ref 0–0.85)
MONOCYTES NFR BLD AUTO: 8 % (ref 0–13.4)
NEUTROPHILS # BLD AUTO: 5.42 K/UL (ref 1.82–7.42)
NEUTROPHILS NFR BLD: 64.6 % (ref 44–72)
NRBC # BLD AUTO: 0 K/UL
NRBC BLD-RTO: 0 /100 WBC (ref 0–0.2)
NUMBER OF RH DOSES IND 8505RD: NORMAL
PLATELET # BLD AUTO: 172 K/UL (ref 164–446)
PMV BLD AUTO: 12.3 FL (ref 9–12.9)
POTASSIUM SERPL-SCNC: 4 MMOL/L (ref 3.6–5.5)
PROT SERPL-MCNC: 6.5 G/DL (ref 6–8.2)
PROT UR-MCNC: 6.1 MG/DL (ref 0–15)
PROT/CREAT UR: 314 MG/G (ref 10–107)
RBC # BLD AUTO: 4.91 M/UL (ref 4.2–5.4)
SODIUM SERPL-SCNC: 137 MMOL/L (ref 135–145)
T PALLIDUM AB SER QL IA: NORMAL
URATE SERPL-MCNC: 3.9 MG/DL (ref 1.9–8.2)
WBC # BLD AUTO: 8.4 K/UL (ref 4.8–10.8)

## 2025-05-23 PROCEDURE — 85025 COMPLETE CBC W/AUTO DIFF WBC: CPT

## 2025-05-23 PROCEDURE — 160029 HCHG SURGERY MINUTES - 1ST 30 MINS LEVEL 4: Performed by: OBSTETRICS & GYNECOLOGY

## 2025-05-23 PROCEDURE — 700111 HCHG RX REV CODE 636 W/ 250 OVERRIDE (IP): Mod: JZ | Performed by: ANESTHESIOLOGY

## 2025-05-23 PROCEDURE — 82570 ASSAY OF URINE CREATININE: CPT

## 2025-05-23 PROCEDURE — A9270 NON-COVERED ITEM OR SERVICE: HCPCS | Performed by: ANESTHESIOLOGY

## 2025-05-23 PROCEDURE — A9270 NON-COVERED ITEM OR SERVICE: HCPCS | Performed by: OBSTETRICS & GYNECOLOGY

## 2025-05-23 PROCEDURE — 160041 HCHG SURGERY MINUTES - EA ADDL 1 MIN LEVEL 4: Performed by: OBSTETRICS & GYNECOLOGY

## 2025-05-23 PROCEDURE — 160002 HCHG RECOVERY MINUTES (STAT): Performed by: OBSTETRICS & GYNECOLOGY

## 2025-05-23 PROCEDURE — C1755 CATHETER, INTRASPINAL: HCPCS | Performed by: OBSTETRICS & GYNECOLOGY

## 2025-05-23 PROCEDURE — 84156 ASSAY OF PROTEIN URINE: CPT

## 2025-05-23 PROCEDURE — 84550 ASSAY OF BLOOD/URIC ACID: CPT

## 2025-05-23 PROCEDURE — 303615 HCHG EPIDURAL/SPINAL ANESTHESIA FOR LABOR

## 2025-05-23 PROCEDURE — 700111 HCHG RX REV CODE 636 W/ 250 OVERRIDE (IP): Performed by: OBSTETRICS & GYNECOLOGY

## 2025-05-23 PROCEDURE — 700101 HCHG RX REV CODE 250: Performed by: ANESTHESIOLOGY

## 2025-05-23 PROCEDURE — 160048 HCHG OR STATISTICAL LEVEL 1-5: Performed by: OBSTETRICS & GYNECOLOGY

## 2025-05-23 PROCEDURE — 700102 HCHG RX REV CODE 250 W/ 637 OVERRIDE(OP): Performed by: ANESTHESIOLOGY

## 2025-05-23 PROCEDURE — 80053 COMPREHEN METABOLIC PANEL: CPT

## 2025-05-23 PROCEDURE — 700102 HCHG RX REV CODE 250 W/ 637 OVERRIDE(OP): Performed by: OBSTETRICS & GYNECOLOGY

## 2025-05-23 PROCEDURE — 160009 HCHG ANES TIME/MIN: Performed by: OBSTETRICS & GYNECOLOGY

## 2025-05-23 PROCEDURE — 770002 HCHG ROOM/CARE - OB PRIVATE (112)

## 2025-05-23 PROCEDURE — 160036 HCHG PACU - EA ADDL 30 MINS PHASE I: Performed by: OBSTETRICS & GYNECOLOGY

## 2025-05-23 PROCEDURE — 86780 TREPONEMA PALLIDUM: CPT

## 2025-05-23 PROCEDURE — 700105 HCHG RX REV CODE 258: Performed by: ANESTHESIOLOGY

## 2025-05-23 PROCEDURE — 160035 HCHG PACU - 1ST 60 MINS PHASE I: Performed by: OBSTETRICS & GYNECOLOGY

## 2025-05-23 PROCEDURE — 160015 HCHG STAT PREOP MINUTES: Performed by: OBSTETRICS & GYNECOLOGY

## 2025-05-23 PROCEDURE — 36415 COLL VENOUS BLD VENIPUNCTURE: CPT

## 2025-05-23 PROCEDURE — 302449 STATCHG TRIAGE ONLY (STATISTIC)

## 2025-05-23 RX ORDER — ACETAMINOPHEN 500 MG
1000 TABLET ORAL EVERY 6 HOURS PRN
Status: DISCONTINUED | OUTPATIENT
Start: 2025-05-28 | End: 2025-05-26 | Stop reason: HOSPADM

## 2025-05-23 RX ORDER — OXYCODONE HYDROCHLORIDE 10 MG/1
10 TABLET ORAL EVERY 4 HOURS PRN
Status: ACTIVE | OUTPATIENT
Start: 2025-05-23 | End: 2025-05-24

## 2025-05-23 RX ORDER — MEPERIDINE HYDROCHLORIDE 25 MG/ML
6.25 INJECTION INTRAMUSCULAR; INTRAVENOUS; SUBCUTANEOUS
Status: DISCONTINUED | OUTPATIENT
Start: 2025-05-23 | End: 2025-05-23 | Stop reason: HOSPADM

## 2025-05-23 RX ORDER — IBUPROFEN 800 MG/1
800 TABLET, FILM COATED ORAL EVERY 8 HOURS PRN
Status: DISCONTINUED | OUTPATIENT
Start: 2025-05-27 | End: 2025-05-26 | Stop reason: HOSPADM

## 2025-05-23 RX ORDER — OXYCODONE HCL 5 MG/5 ML
5 SOLUTION, ORAL ORAL
Status: COMPLETED | OUTPATIENT
Start: 2025-05-23 | End: 2025-05-23

## 2025-05-23 RX ORDER — OXYTOCIN 10 [USP'U]/ML
10 INJECTION, SOLUTION INTRAMUSCULAR; INTRAVENOUS
Status: DISCONTINUED | OUTPATIENT
Start: 2025-05-23 | End: 2025-05-26 | Stop reason: HOSPADM

## 2025-05-23 RX ORDER — SODIUM CHLORIDE, SODIUM GLUCONATE, SODIUM ACETATE, POTASSIUM CHLORIDE AND MAGNESIUM CHLORIDE 526; 502; 368; 37; 30 MG/100ML; MG/100ML; MG/100ML; MG/100ML; MG/100ML
INJECTION, SOLUTION INTRAVENOUS
Status: DISCONTINUED | OUTPATIENT
Start: 2025-05-23 | End: 2025-05-23 | Stop reason: SURG

## 2025-05-23 RX ORDER — DIPHENHYDRAMINE HYDROCHLORIDE 50 MG/ML
25 INJECTION, SOLUTION INTRAMUSCULAR; INTRAVENOUS EVERY 6 HOURS PRN
Status: ACTIVE | OUTPATIENT
Start: 2025-05-23 | End: 2025-05-24

## 2025-05-23 RX ORDER — DIPHENHYDRAMINE HYDROCHLORIDE 50 MG/ML
12.5 INJECTION, SOLUTION INTRAMUSCULAR; INTRAVENOUS EVERY 6 HOURS PRN
Status: ACTIVE | OUTPATIENT
Start: 2025-05-23 | End: 2025-05-24

## 2025-05-23 RX ORDER — VITAMIN A ACETATE, BETA CAROTENE, ASCORBIC ACID, CHOLECALCIFEROL, .ALPHA.-TOCOPHEROL ACETATE, DL-, THIAMINE MONONITRATE, RIBOFLAVIN, NIACINAMIDE, PYRIDOXINE HYDROCHLORIDE, FOLIC ACID, CYANOCOBALAMIN, CALCIUM CARBONATE, FERROUS FUMARATE, ZINC OXIDE, CUPRIC OXIDE 3080; 12; 120; 400; 1; 1.84; 3; 20; 22; 920; 25; 200; 27; 10; 2 [IU]/1; UG/1; MG/1; [IU]/1; MG/1; MG/1; MG/1; MG/1; MG/1; [IU]/1; MG/1; MG/1; MG/1; MG/1; MG/1
1 TABLET, FILM COATED ORAL
Status: DISCONTINUED | OUTPATIENT
Start: 2025-05-24 | End: 2025-05-26 | Stop reason: HOSPADM

## 2025-05-23 RX ORDER — SODIUM CHLORIDE, SODIUM LACTATE, POTASSIUM CHLORIDE, CALCIUM CHLORIDE 600; 310; 30; 20 MG/100ML; MG/100ML; MG/100ML; MG/100ML
INJECTION, SOLUTION INTRAVENOUS CONTINUOUS
Status: DISCONTINUED | OUTPATIENT
Start: 2025-05-23 | End: 2025-05-26 | Stop reason: HOSPADM

## 2025-05-23 RX ORDER — BUPIVACAINE HYDROCHLORIDE 7.5 MG/ML
INJECTION, SOLUTION INTRASPINAL PRN
Status: DISCONTINUED | OUTPATIENT
Start: 2025-05-23 | End: 2025-05-23 | Stop reason: SURG

## 2025-05-23 RX ORDER — EPHEDRINE SULFATE 50 MG/ML
5 INJECTION, SOLUTION INTRAVENOUS
Status: DISCONTINUED | OUTPATIENT
Start: 2025-05-23 | End: 2025-05-23 | Stop reason: HOSPADM

## 2025-05-23 RX ORDER — HYDROMORPHONE HYDROCHLORIDE 1 MG/ML
0.4 INJECTION, SOLUTION INTRAMUSCULAR; INTRAVENOUS; SUBCUTANEOUS
Status: DISCONTINUED | OUTPATIENT
Start: 2025-05-23 | End: 2025-05-23 | Stop reason: HOSPADM

## 2025-05-23 RX ORDER — ONDANSETRON 2 MG/ML
INJECTION INTRAMUSCULAR; INTRAVENOUS PRN
Status: DISCONTINUED | OUTPATIENT
Start: 2025-05-23 | End: 2025-05-23 | Stop reason: SURG

## 2025-05-23 RX ORDER — HYDROMORPHONE HYDROCHLORIDE 1 MG/ML
0.2 INJECTION, SOLUTION INTRAMUSCULAR; INTRAVENOUS; SUBCUTANEOUS
Status: ACTIVE | OUTPATIENT
Start: 2025-05-23 | End: 2025-05-24

## 2025-05-23 RX ORDER — METOCLOPRAMIDE HYDROCHLORIDE 5 MG/ML
10 INJECTION INTRAMUSCULAR; INTRAVENOUS ONCE
Status: COMPLETED | OUTPATIENT
Start: 2025-05-23 | End: 2025-05-23

## 2025-05-23 RX ORDER — MORPHINE SULFATE 0.5 MG/ML
INJECTION, SOLUTION EPIDURAL; INTRATHECAL; INTRAVENOUS PRN
Status: DISCONTINUED | OUTPATIENT
Start: 2025-05-23 | End: 2025-05-23 | Stop reason: SURG

## 2025-05-23 RX ORDER — DIPHENHYDRAMINE HCL 25 MG
25 TABLET ORAL EVERY 6 HOURS PRN
Status: DISCONTINUED | OUTPATIENT
Start: 2025-05-25 | End: 2025-05-26 | Stop reason: HOSPADM

## 2025-05-23 RX ORDER — ALBUTEROL SULFATE 5 MG/ML
2.5 SOLUTION RESPIRATORY (INHALATION)
Status: DISCONTINUED | OUTPATIENT
Start: 2025-05-23 | End: 2025-05-23 | Stop reason: HOSPADM

## 2025-05-23 RX ORDER — ACETAMINOPHEN 500 MG
1000 TABLET ORAL EVERY 6 HOURS
Status: DISCONTINUED | OUTPATIENT
Start: 2025-05-25 | End: 2025-05-26 | Stop reason: HOSPADM

## 2025-05-23 RX ORDER — SCOPOLAMINE 1 MG/3D
PATCH, EXTENDED RELEASE TRANSDERMAL PRN
Status: DISCONTINUED | OUTPATIENT
Start: 2025-05-23 | End: 2025-05-23 | Stop reason: SURG

## 2025-05-23 RX ORDER — OXYCODONE HYDROCHLORIDE 10 MG/1
10 TABLET ORAL EVERY 4 HOURS PRN
Status: DISCONTINUED | OUTPATIENT
Start: 2025-05-25 | End: 2025-05-24

## 2025-05-23 RX ORDER — DIPHENHYDRAMINE HYDROCHLORIDE 50 MG/ML
12.5 INJECTION, SOLUTION INTRAMUSCULAR; INTRAVENOUS
Status: DISCONTINUED | OUTPATIENT
Start: 2025-05-23 | End: 2025-05-23 | Stop reason: HOSPADM

## 2025-05-23 RX ORDER — LEVOTHYROXINE SODIUM 88 UG/1
88 TABLET ORAL
Status: DISCONTINUED | OUTPATIENT
Start: 2025-05-24 | End: 2025-05-26 | Stop reason: HOSPADM

## 2025-05-23 RX ORDER — HALOPERIDOL 5 MG/ML
1 INJECTION INTRAMUSCULAR
Status: DISCONTINUED | OUTPATIENT
Start: 2025-05-23 | End: 2025-05-23 | Stop reason: HOSPADM

## 2025-05-23 RX ORDER — EPHEDRINE SULFATE 50 MG/ML
INJECTION, SOLUTION INTRAVENOUS PRN
Status: DISCONTINUED | OUTPATIENT
Start: 2025-05-23 | End: 2025-05-23 | Stop reason: SURG

## 2025-05-23 RX ORDER — PHENYLEPHRINE HYDROCHLORIDE 10 MG/ML
INJECTION, SOLUTION INTRAMUSCULAR; INTRAVENOUS; SUBCUTANEOUS PRN
Status: DISCONTINUED | OUTPATIENT
Start: 2025-05-23 | End: 2025-05-23 | Stop reason: SURG

## 2025-05-23 RX ORDER — ONDANSETRON 2 MG/ML
4 INJECTION INTRAMUSCULAR; INTRAVENOUS EVERY 6 HOURS PRN
Status: ACTIVE | OUTPATIENT
Start: 2025-05-23 | End: 2025-05-24

## 2025-05-23 RX ORDER — CEFAZOLIN SODIUM 1 G/3ML
INJECTION, POWDER, FOR SOLUTION INTRAMUSCULAR; INTRAVENOUS PRN
Status: DISCONTINUED | OUTPATIENT
Start: 2025-05-23 | End: 2025-05-23 | Stop reason: SURG

## 2025-05-23 RX ORDER — HYDROMORPHONE HYDROCHLORIDE 1 MG/ML
0.2 INJECTION, SOLUTION INTRAMUSCULAR; INTRAVENOUS; SUBCUTANEOUS
Status: DISCONTINUED | OUTPATIENT
Start: 2025-05-23 | End: 2025-05-23 | Stop reason: HOSPADM

## 2025-05-23 RX ORDER — CALCIUM CARBONATE 500 MG/1
1000 TABLET, CHEWABLE ORAL EVERY 6 HOURS PRN
Status: DISCONTINUED | OUTPATIENT
Start: 2025-05-23 | End: 2025-05-26 | Stop reason: HOSPADM

## 2025-05-23 RX ORDER — HYDROMORPHONE HYDROCHLORIDE 1 MG/ML
0.1 INJECTION, SOLUTION INTRAMUSCULAR; INTRAVENOUS; SUBCUTANEOUS
Status: DISCONTINUED | OUTPATIENT
Start: 2025-05-23 | End: 2025-05-23 | Stop reason: HOSPADM

## 2025-05-23 RX ORDER — EPHEDRINE SULFATE 50 MG/ML
10 INJECTION, SOLUTION INTRAVENOUS
Status: ACTIVE | OUTPATIENT
Start: 2025-05-23 | End: 2025-05-24

## 2025-05-23 RX ORDER — DOCUSATE SODIUM 100 MG/1
100 CAPSULE, LIQUID FILLED ORAL 2 TIMES DAILY PRN
Status: DISCONTINUED | OUTPATIENT
Start: 2025-05-23 | End: 2025-05-26 | Stop reason: HOSPADM

## 2025-05-23 RX ORDER — HYDRALAZINE HYDROCHLORIDE 20 MG/ML
5 INJECTION INTRAMUSCULAR; INTRAVENOUS
Status: DISCONTINUED | OUTPATIENT
Start: 2025-05-23 | End: 2025-05-23 | Stop reason: HOSPADM

## 2025-05-23 RX ORDER — KETOROLAC TROMETHAMINE 15 MG/ML
15 INJECTION, SOLUTION INTRAMUSCULAR; INTRAVENOUS EVERY 6 HOURS
Status: COMPLETED | OUTPATIENT
Start: 2025-05-23 | End: 2025-05-24

## 2025-05-23 RX ORDER — LABETALOL HYDROCHLORIDE 5 MG/ML
5 INJECTION, SOLUTION INTRAVENOUS
Status: DISCONTINUED | OUTPATIENT
Start: 2025-05-23 | End: 2025-05-23 | Stop reason: HOSPADM

## 2025-05-23 RX ORDER — MISOPROSTOL 200 UG/1
800 TABLET ORAL
Status: DISCONTINUED | OUTPATIENT
Start: 2025-05-23 | End: 2025-05-26 | Stop reason: HOSPADM

## 2025-05-23 RX ORDER — OXYCODONE HYDROCHLORIDE 5 MG/1
5 TABLET ORAL EVERY 4 HOURS PRN
Status: DISPENSED | OUTPATIENT
Start: 2025-05-23 | End: 2025-05-24

## 2025-05-23 RX ORDER — DIPHENHYDRAMINE HYDROCHLORIDE 50 MG/ML
25 INJECTION, SOLUTION INTRAMUSCULAR; INTRAVENOUS EVERY 6 HOURS PRN
Status: DISCONTINUED | OUTPATIENT
Start: 2025-05-25 | End: 2025-05-26 | Stop reason: HOSPADM

## 2025-05-23 RX ORDER — OXYCODONE HCL 5 MG/5 ML
10 SOLUTION, ORAL ORAL
Status: COMPLETED | OUTPATIENT
Start: 2025-05-23 | End: 2025-05-23

## 2025-05-23 RX ORDER — LEVOTHYROXINE SODIUM 88 UG/1
88 TABLET ORAL
Status: DISCONTINUED | OUTPATIENT
Start: 2025-05-24 | End: 2025-05-23

## 2025-05-23 RX ORDER — ONDANSETRON 2 MG/ML
4 INJECTION INTRAMUSCULAR; INTRAVENOUS EVERY 6 HOURS PRN
Status: DISCONTINUED | OUTPATIENT
Start: 2025-05-25 | End: 2025-05-26 | Stop reason: HOSPADM

## 2025-05-23 RX ORDER — SODIUM CHLORIDE 9 MG/ML
INJECTION, SOLUTION INTRAVENOUS ONCE
Status: DISCONTINUED | OUTPATIENT
Start: 2025-05-23 | End: 2025-05-26 | Stop reason: HOSPADM

## 2025-05-23 RX ORDER — CITRIC ACID/SODIUM CITRATE 334-500MG
30 SOLUTION, ORAL ORAL ONCE
Status: COMPLETED | OUTPATIENT
Start: 2025-05-23 | End: 2025-05-23

## 2025-05-23 RX ORDER — HYDROMORPHONE HYDROCHLORIDE 1 MG/ML
0.4 INJECTION, SOLUTION INTRAMUSCULAR; INTRAVENOUS; SUBCUTANEOUS
Status: ACTIVE | OUTPATIENT
Start: 2025-05-23 | End: 2025-05-24

## 2025-05-23 RX ORDER — IBUPROFEN 800 MG/1
800 TABLET, FILM COATED ORAL EVERY 8 HOURS
Status: DISCONTINUED | OUTPATIENT
Start: 2025-05-25 | End: 2025-05-26 | Stop reason: HOSPADM

## 2025-05-23 RX ORDER — ONDANSETRON 2 MG/ML
4 INJECTION INTRAMUSCULAR; INTRAVENOUS
Status: COMPLETED | OUTPATIENT
Start: 2025-05-23 | End: 2025-05-23

## 2025-05-23 RX ORDER — CARBOPROST TROMETHAMINE 250 UG/ML
250 INJECTION, SOLUTION INTRAMUSCULAR
Status: DISCONTINUED | OUTPATIENT
Start: 2025-05-23 | End: 2025-05-26 | Stop reason: HOSPADM

## 2025-05-23 RX ORDER — ACETAMINOPHEN 500 MG
1000 TABLET ORAL EVERY 6 HOURS
Status: DISPENSED | OUTPATIENT
Start: 2025-05-23 | End: 2025-05-24

## 2025-05-23 RX ORDER — SODIUM CHLORIDE, SODIUM LACTATE, POTASSIUM CHLORIDE, CALCIUM CHLORIDE 600; 310; 30; 20 MG/100ML; MG/100ML; MG/100ML; MG/100ML
2000 INJECTION, SOLUTION INTRAVENOUS PRN
Status: DISCONTINUED | OUTPATIENT
Start: 2025-05-23 | End: 2025-05-26 | Stop reason: HOSPADM

## 2025-05-23 RX ORDER — OXYCODONE HYDROCHLORIDE 5 MG/1
5 TABLET ORAL EVERY 4 HOURS PRN
Status: DISCONTINUED | OUTPATIENT
Start: 2025-05-25 | End: 2025-05-26 | Stop reason: HOSPADM

## 2025-05-23 RX ORDER — SIMETHICONE 125 MG
125 TABLET,CHEWABLE ORAL 4 TIMES DAILY PRN
Status: DISCONTINUED | OUTPATIENT
Start: 2025-05-23 | End: 2025-05-26 | Stop reason: HOSPADM

## 2025-05-23 RX ORDER — CEFAZOLIN SODIUM 1 G/3ML
2 INJECTION, POWDER, FOR SOLUTION INTRAMUSCULAR; INTRAVENOUS ONCE
Status: DISCONTINUED | OUTPATIENT
Start: 2025-05-23 | End: 2025-05-23 | Stop reason: HOSPADM

## 2025-05-23 RX ORDER — ONDANSETRON 4 MG/1
4 TABLET, ORALLY DISINTEGRATING ORAL EVERY 6 HOURS PRN
Status: DISCONTINUED | OUTPATIENT
Start: 2025-05-25 | End: 2025-05-26 | Stop reason: HOSPADM

## 2025-05-23 RX ORDER — KETOROLAC TROMETHAMINE 15 MG/ML
INJECTION, SOLUTION INTRAMUSCULAR; INTRAVENOUS PRN
Status: DISCONTINUED | OUTPATIENT
Start: 2025-05-23 | End: 2025-05-23 | Stop reason: SURG

## 2025-05-23 RX ORDER — NIFEDIPINE 10 MG/1
10 CAPSULE ORAL
Status: DISCONTINUED | OUTPATIENT
Start: 2025-05-23 | End: 2025-05-26 | Stop reason: HOSPADM

## 2025-05-23 RX ORDER — MIDAZOLAM HYDROCHLORIDE 1 MG/ML
1 INJECTION INTRAMUSCULAR; INTRAVENOUS
Status: DISCONTINUED | OUTPATIENT
Start: 2025-05-23 | End: 2025-05-23 | Stop reason: HOSPADM

## 2025-05-23 RX ADMIN — ONDANSETRON 4 MG: 2 INJECTION INTRAMUSCULAR; INTRAVENOUS at 17:27

## 2025-05-23 RX ADMIN — EPHEDRINE SULFATE 50 MG: 50 INJECTION, SOLUTION INTRAVENOUS at 16:55

## 2025-05-23 RX ADMIN — BUPIVACAINE HYDROCHLORIDE IN DEXTROSE 1.5 ML: 7.5 INJECTION, SOLUTION SUBARACHNOID at 16:48

## 2025-05-23 RX ADMIN — METOCLOPRAMIDE HYDROCHLORIDE 10 MG: 5 INJECTION INTRAMUSCULAR; INTRAVENOUS at 16:30

## 2025-05-23 RX ADMIN — SCOPOLAMINE 1 PATCH: 1.5 PATCH, EXTENDED RELEASE TRANSDERMAL at 16:50

## 2025-05-23 RX ADMIN — ONDANSETRON 4 MG: 2 INJECTION INTRAMUSCULAR; INTRAVENOUS at 19:14

## 2025-05-23 RX ADMIN — MORPHINE SULFATE 100 MCG: 0.5 INJECTION, SOLUTION EPIDURAL; INTRATHECAL; INTRAVENOUS at 16:48

## 2025-05-23 RX ADMIN — SODIUM CHLORIDE, SODIUM GLUCONATE, SODIUM ACETATE, POTASSIUM CHLORIDE AND MAGNESIUM CHLORIDE: 526; 502; 368; 37; 30 INJECTION, SOLUTION INTRAVENOUS at 16:40

## 2025-05-23 RX ADMIN — OXYTOCIN 125 ML/HR: 10 INJECTION, SOLUTION INTRAMUSCULAR; INTRAVENOUS at 18:40

## 2025-05-23 RX ADMIN — FAMOTIDINE 20 MG: 10 INJECTION, SOLUTION INTRAVENOUS at 16:30

## 2025-05-23 RX ADMIN — FENTANYL CITRATE 10 MCG: 50 INJECTION, SOLUTION INTRAMUSCULAR; INTRAVENOUS at 16:48

## 2025-05-23 RX ADMIN — PHENYLEPHRINE HYDROCHLORIDE 200 MCG: 10 INJECTION INTRAVENOUS at 17:14

## 2025-05-23 RX ADMIN — OXYTOCIN 600 ML: 10 INJECTION, SOLUTION INTRAMUSCULAR; INTRAVENOUS at 17:33

## 2025-05-23 RX ADMIN — KETOROLAC TROMETHAMINE 15 MG: 15 INJECTION, SOLUTION INTRAMUSCULAR; INTRAVENOUS at 17:27

## 2025-05-23 RX ADMIN — CEFAZOLIN 2 G: 1 INJECTION, POWDER, FOR SOLUTION INTRAMUSCULAR; INTRAVENOUS at 16:42

## 2025-05-23 RX ADMIN — PHENYLEPHRINE HYDROCHLORIDE 200 MCG: 10 INJECTION INTRAVENOUS at 17:19

## 2025-05-23 RX ADMIN — DOCUSATE SODIUM 100 MG: 100 CAPSULE, LIQUID FILLED ORAL at 23:29

## 2025-05-23 RX ADMIN — SODIUM CITRATE AND CITRIC ACID MONOHYDRATE 30 ML: 2004; 3000 SOLUTION ORAL at 16:32

## 2025-05-23 RX ADMIN — KETOROLAC TROMETHAMINE 15 MG: 15 INJECTION, SOLUTION INTRAMUSCULAR; INTRAVENOUS at 23:31

## 2025-05-23 RX ADMIN — PHENYLEPHRINE HYDROCHLORIDE 200 MCG: 10 INJECTION INTRAVENOUS at 17:28

## 2025-05-23 RX ADMIN — PHENYLEPHRINE HYDROCHLORIDE 200 MCG: 10 INJECTION INTRAVENOUS at 16:58

## 2025-05-23 RX ADMIN — OXYCODONE 5 MG: 5 TABLET ORAL at 23:29

## 2025-05-23 RX ADMIN — OXYTOCIN 20 UNITS: 10 INJECTION, SOLUTION INTRAMUSCULAR; INTRAVENOUS at 17:30

## 2025-05-23 RX ADMIN — OXYCODONE HYDROCHLORIDE 5 MG: 5 SOLUTION ORAL at 19:53

## 2025-05-23 ASSESSMENT — PAIN SCALES - GENERAL
PAINLEVEL: 0 - NO PAIN
PAIN_LEVEL: 0

## 2025-05-23 ASSESSMENT — PAIN DESCRIPTION - PAIN TYPE
TYPE: ACUTE PAIN;SURGICAL PAIN

## 2025-05-23 ASSESSMENT — FIBROSIS 4 INDEX: FIB4 SCORE: 0.96

## 2025-05-23 NOTE — PROGRESS NOTES
1510 Report received from JESSIE Sellers. POC discussed, assuming care.     1518 Call to Dr Stuart, report given. Admission orders received.    1530 Report given to JESSIE Lennon. POC discussed, care relinquished.

## 2025-05-23 NOTE — PROGRESS NOTES
1349: Lab at bedside.     1456: Dr. Stuart called, updated on patient status including BP values, lab results. Will call and update on PCR results. OK for patient to come off monitor now, EFM tracing reviewed by Sade YUNG.

## 2025-05-23 NOTE — ANESTHESIA PREPROCEDURE EVALUATION
Case: [id number] Anesthesia Start Date/Time: 25 1640    Procedure:  SECTION, REPEAT (Abdomen)    Anesthesia type: Spinal    Pre-op diagnosis: pre e    Location: [facility] OR 02 / SURGERY LABOR AND DELIVERY    Surgeons: Nikolay Stuart M.D.            Relevant Problems   ANESTHESIA   (positive) SHIMON (obstructive sleep apnea)      ENDO   (positive) Hypothyroidism (acquired)   (positive) Hypothyroidism due to Hashimoto's thyroiditis       Physical Exam    Airway   Mallampati: I  TM distance: >3 FB  Neck ROM: full       Cardiovascular - normal exam  Rhythm: regular  Rate: normal     Dental    Pulmonary Breath sounds clear to auscultation     Abdominal (+) obese     Neurological - normal exam                   Anesthesia Plan    ASA 2       Plan - spinal                   Postoperative Plan: Postoperative administration of opioids is intended.    Pertinent diagnostic labs and testing reviewed    Informed Consent:    Anesthetic plan and risks discussed with patient.

## 2025-05-23 NOTE — ANESTHESIA PREPROCEDURE EVALUATION
Case: [id number] Date/Time: 25 1145    Procedure: REPEAT CAESAREAN SECTION    Pre-op diagnosis: PRIOR  SECTION-39 3/7 WEEKS    Location: [facility] OR 01 / SURGERY LABOR AND DELIVERY    Surgeons: Nikolay Stuart M.D.            Relevant Problems   ANESTHESIA   (positive) SHIMON (obstructive sleep apnea)      ENDO   (positive) Hypothyroidism (acquired)   (positive) Hypothyroidism due to Hashimoto's thyroiditis       Physical Exam    Airway   Mallampati: I  TM distance: >3 FB  Neck ROM: full       Cardiovascular - normal exam   Dental    Pulmonary Breath sounds clear to auscultation     Abdominal (+) obese     Neurological - normal exam                   Anesthesia Plan    ASA 2 (pre-eclampsia, h/o )- EMERGENT   ASA physical status emergent criteria: other (comment)    Plan - spinal                   Postoperative Plan: Postoperative administration of opioids is intended.    Pertinent diagnostic labs and testing reviewed    Informed Consent:    Anesthetic plan and risks discussed with patient.

## 2025-05-24 LAB
ERYTHROCYTE [DISTWIDTH] IN BLOOD BY AUTOMATED COUNT: 46.3 FL (ref 35.9–50)
HCT VFR BLD AUTO: 37.4 % (ref 37–47)
HGB BLD-MCNC: 11.9 G/DL (ref 12–16)
MCH RBC QN AUTO: 27.9 PG (ref 27–33)
MCHC RBC AUTO-ENTMCNC: 31.8 G/DL (ref 32.2–35.5)
MCV RBC AUTO: 87.8 FL (ref 81.4–97.8)
PLATELET # BLD AUTO: 136 K/UL (ref 164–446)
PMV BLD AUTO: 12.2 FL (ref 9–12.9)
RBC # BLD AUTO: 4.26 M/UL (ref 4.2–5.4)
WBC # BLD AUTO: 11.1 K/UL (ref 4.8–10.8)

## 2025-05-24 PROCEDURE — 700102 HCHG RX REV CODE 250 W/ 637 OVERRIDE(OP): Performed by: OBSTETRICS & GYNECOLOGY

## 2025-05-24 PROCEDURE — 700111 HCHG RX REV CODE 636 W/ 250 OVERRIDE (IP): Mod: JZ | Performed by: ANESTHESIOLOGY

## 2025-05-24 PROCEDURE — 36415 COLL VENOUS BLD VENIPUNCTURE: CPT

## 2025-05-24 PROCEDURE — 700102 HCHG RX REV CODE 250 W/ 637 OVERRIDE(OP): Performed by: ANESTHESIOLOGY

## 2025-05-24 PROCEDURE — A9270 NON-COVERED ITEM OR SERVICE: HCPCS | Performed by: OBSTETRICS & GYNECOLOGY

## 2025-05-24 PROCEDURE — 85027 COMPLETE CBC AUTOMATED: CPT

## 2025-05-24 PROCEDURE — 770002 HCHG ROOM/CARE - OB PRIVATE (112)

## 2025-05-24 PROCEDURE — A9270 NON-COVERED ITEM OR SERVICE: HCPCS | Performed by: ANESTHESIOLOGY

## 2025-05-24 RX ORDER — OXYCODONE HYDROCHLORIDE 10 MG/1
10 TABLET ORAL EVERY 4 HOURS PRN
Refills: 0 | Status: DISCONTINUED | OUTPATIENT
Start: 2025-05-24 | End: 2025-05-26 | Stop reason: HOSPADM

## 2025-05-24 RX ADMIN — DOCUSATE SODIUM 100 MG: 100 CAPSULE, LIQUID FILLED ORAL at 12:32

## 2025-05-24 RX ADMIN — OXYCODONE 5 MG: 5 TABLET ORAL at 06:57

## 2025-05-24 RX ADMIN — METFORMIN HYDROCHLORIDE 1000 MG: 500 TABLET ORAL at 17:56

## 2025-05-24 RX ADMIN — OXYCODONE HYDROCHLORIDE 10 MG: 10 TABLET ORAL at 23:25

## 2025-05-24 RX ADMIN — OXYCODONE 5 MG: 5 TABLET ORAL at 16:13

## 2025-05-24 RX ADMIN — KETOROLAC TROMETHAMINE 15 MG: 15 INJECTION, SOLUTION INTRAMUSCULAR; INTRAVENOUS at 17:56

## 2025-05-24 RX ADMIN — KETOROLAC TROMETHAMINE 15 MG: 15 INJECTION, SOLUTION INTRAMUSCULAR; INTRAVENOUS at 06:45

## 2025-05-24 RX ADMIN — ACETAMINOPHEN 1000 MG: 500 TABLET ORAL at 12:31

## 2025-05-24 RX ADMIN — ACETAMINOPHEN 1000 MG: 500 TABLET ORAL at 17:56

## 2025-05-24 RX ADMIN — KETOROLAC TROMETHAMINE 15 MG: 15 INJECTION, SOLUTION INTRAMUSCULAR; INTRAVENOUS at 11:55

## 2025-05-24 RX ADMIN — SIMETHICONE 125 MG: 125 TABLET, CHEWABLE ORAL at 16:13

## 2025-05-24 RX ADMIN — OXYCODONE 5 MG: 5 TABLET ORAL at 11:54

## 2025-05-24 RX ADMIN — Medication 1 LOZENGE: at 22:23

## 2025-05-24 RX ADMIN — PRENATAL WITH FERROUS FUM AND FOLIC ACID 1 TABLET: 3080; 920; 120; 400; 22; 1.84; 3; 20; 10; 1; 12; 200; 27; 25; 2 TABLET ORAL at 12:37

## 2025-05-24 RX ADMIN — LEVOTHYROXINE SODIUM 88 MCG: 0.09 TABLET ORAL at 06:45

## 2025-05-24 ASSESSMENT — PAIN DESCRIPTION - PAIN TYPE
TYPE: ACUTE PAIN;SURGICAL PAIN
TYPE: SURGICAL PAIN;ACUTE PAIN
TYPE: ACUTE PAIN;SURGICAL PAIN
TYPE: ACUTE PAIN;SURGICAL PAIN
TYPE: SURGICAL PAIN
TYPE: ACUTE PAIN;SURGICAL PAIN
TYPE: SURGICAL PAIN;ACUTE PAIN

## 2025-05-24 NOTE — LACTATION NOTE
This note was copied from a baby's chart.  Initial Visit:    38w5d infant delivered via c section to a  mother 25 at 1708  Recently documented latch score 7    Feeding Plan: breastfeeding    Breastfeeding History: Milli breast and bottle fed her first child for 5 months, her milk supply was not sufficient for exclusive breast feeding    Medical History: PCOS, hypothyroid, GDM, AMA.     Milli reports that Barbara has latched a couple of times since her delivery, but she is feeling concerned that she is not feeding well at the breast. LC offered latch assistance and she agrees.     Bilateral breasts are wide spaced, symmetrical and Milli reports positive breast changes during pregnancy, nipples everted and intact, no damage noted.    Baby placed skin to skin in cross cradle position on the left side. LC assisted with proper positioning, breast wedging and asymmetrical latch technique. When presented the nipple, baby opened mouth with a wide gape and latched deeply to the breast. Baby on and off the breast frequently, nasal congestion noted. A deep latch was achieved after several attempts which Barbara maintained for about 3-5 minutes. She was noted to have intermittent nutritive/non nutritive suck pattern and a few swallows were appreciated. When she came off the breast Milli's nipple was intact and round.     Barbara was then moved to the right side. A deep latch was achieved much more quickly, and Barbara continued to feed during the remainder of the consultation. Milli reports no discomfort and was able to hold Barbara, maintaining proper positioning and alignment for a nutritive feeding.     Breast feeding education provided: Education provided regarding the milk making process and supply in demand. Frequent skin to skin encouraged. Encouraged MOB to offer the breast to baby any time she is showing hunger cues (cues reviewed). Encouraged MOB to allow baby to self limit at  the breast. Anticipatory guidance provided regarding typical  feeding behaviors in the first 24-48hrs, including cluster feeding. Proper positioning and latch technique verbalized. Education provided regarding the importance of achieving a deep latch with each feeding to ensure proper stimulation, milk transfer, and reduce the chance for nipple damage/pain.     Plan: Continue offering the breast to baby on cue, a minimum of 8 times every 24hrs. Skin to skin for each feeding. Hand expression and feed back colostrum (with RN assistance) if baby due to feed, but too sleepy or unable to latch. Do not limit baby's time at the breast. Reach out to RN/LC if experiencing pain with latch or if unable to latch baby independently. LC provided outpatient resource handout, and will place outpatient consult referral to [facility].

## 2025-05-24 NOTE — ANESTHESIA TIME REPORT
Anesthesia Start and Stop Event Times       Date Time Event    5/23/2025 1612 Ready for Procedure     1640 Anesthesia Start     1735 Anesthesia Stop          Responsible Staff  05/23/25      Name Role Begin End    Sheila Escalera M.D. Anesth 1640 1735          Overtime Reason:  no overtime (within assigned shift)    Comments:

## 2025-05-24 NOTE — ANESTHESIA PROCEDURE NOTES
Spinal Block    Date/Time: 5/23/2025 4:48 PM    Performed by: Sheila Escalera M.D.  Authorized by: Sheila Escalera M.D.    Patient Location:  OR  Start Time:  5/23/2025 4:48 PM  Reason for Block: primary anesthetic    patient identified, IV checked, site marked, risks and benefits discussed, surgical consent, monitors and equipment checked, pre-op evaluation and timeout performed    Patient Position:  Sitting  Prep: ChloraPrep, patient draped and sterile technique    Monitoring:  Blood pressure, continuous pulse oximetry and heart rate  Approach:  Midline  Location:  L4-5  Injection Technique:  Single-shot  Skin infiltration:  Lidocaine  Strength:  1%  Dose:  3ml  Needle Type:  Pencan  Needle Gauge:  25 G  CSF flowing pre/post injection:  Yes  Sensory Level:  T4

## 2025-05-24 NOTE — PROGRESS NOTES
1900 - Report received from Citlalli ROMAN. Care assumed. Patient resting in bed with baby in arms. FOB at bedside.     2020 - Report given to Anushka ROMAN . Care relinquished.

## 2025-05-24 NOTE — CARE PLAN
The patient is Watcher - Medium risk of patient condition declining or worsening    Shift Goals  Clinical Goals: Pain control, lochia remain WDL, VSS    Progress made toward(s) clinical / shift goals:  Progress made toward(s) clinical / shift goals:  Patient VSS and WDL. Patient now 18 hours post op and awaiting to ambulate. SCDs on and in place. No s/sx of infection. Mosley output adequate.       Problem: Altered physiologic condition related to postoperative  delivery  Goal: Patient physiologically stable as evidenced by normal lochia, palpable uterine involution and vital signs within normal limits  2025 1314 by Kelsey C. Koyanagi, R.N.  Outcome: Progressing  2025 1301 by Kelsey C. Koyanagi, R.N.  Outcome: Progressing     Problem: Potential for postpartum infection related to surgical incision, compromised uterine condition, urinary tract or respiratory compromise  Goal: Patient will be afebrile and free from signs and symptoms of infection  Outcome: Progressing     Problem: Alteration in comfort related to surgical incision and/or after birth pains  Goal: Patient is able to ambulate, care for self and infant with acceptable pain level  2025 1314 by Kelsey C. Koyanagi, R.N.  Outcome: Progressing  2025 1301 by Kelsey C. Koyanagi, R.N.  Outcome: Progressing  Goal: Patient verbalizes acceptable pain level  Outcome: Progressing           Patient is not progressing towards the following goals:

## 2025-05-24 NOTE — PROGRESS NOTES
Bedside report received from Cat. Assumed care of pt.  pt and on dura. Pt resting comfortably in bed. A/Ox4, VSS, on room air. FOB at bedside. Infant in open crib. All needs met. POC reviewed and white board updated. Call light in reach. Bed locked in lowest position with 2 upper bed rails up. No pain or complaints. Oriented pt and family to room and unit. Cuddles and bands verified with Cat RN

## 2025-05-24 NOTE — PROGRESS NOTES
"Pt refusing to ambulate, get on edge of bed and get out of bed. RN gave education about the importance of ambulating after surgery. Pt verbalized understanding. She said \"I want to get out of bed in the evening today. Don't push me and I want to go with my own pace. I will let the doctor know about it.\" Notified Dr. Yu. MD aware. Charge RN aware as well.  "

## 2025-05-24 NOTE — DISCHARGE PLANNING
Discharge Planning Assessment Post Partum    Reviewed records and met with parents    Reason for Referral: maternal history of anxiety  Address: [address] in [place]  Type of Living Situation:stable  Mom Diagnosis: post partum  Baby Diagnosis:   Primary Language: English    Name of Baby: Barbara  Father of the Baby: Talon Hernandez  Involved in baby’s care? yes  Contact Information: [phone]    Prenatal Care: Dr. Stuart  Mom's PCP: Kylie Lamb  PCP for new baby:Winter Sue    Support System: family  Coping/Bonding between mother & baby: appropriate  Source of Feeding: breast  Supplies for Infant: prepared    Mom's Insurance: [employer]  Baby Covered on Insurance:yes  Mother Employed/School: no  Father:   Other children in the home/ages: 5 year old    Financial Hardship/Income: denies   Mom's Mental status: alert and oriented - tired from delivery  Services used prior to admit: no    CPS History: denies  Psychiatric History: mother has history of PP anxiety/depression. She informed her provider. No therapy, felt she was able to manage.   Domestic Violence History: denies  Drug/ETOH History: denies    Resources Provided: PP support and resources. Parents deny need for any further resources  Referrals Made: none needed     Clearance for Discharge: Infant to discharge home to parent when ready

## 2025-05-24 NOTE — ANESTHESIA POSTPROCEDURE EVALUATION
Patient: Milli Santizo    Procedure Summary       Date: 25 Room / Location: [facility] OR 02 / SURGERY LABOR AND DELIVERY    Anesthesia Start:  Anesthesia Stop:     Procedure:  SECTION, REPEAT (Abdomen) Diagnosis: (pre e)    Surgeons: Nikolay Stuart M.D. Responsible Provider: Sheila Escalera M.D.    Anesthesia Type: spinal ASA Status: 2            Final Anesthesia Type: spinal  Last vitals  BP   Blood Pressure: 136/85    Temp   36.3 °C (97.3 °F)    Pulse   85   Resp   18    SpO2   98 %      Anesthesia Post Evaluation    Patient location during evaluation: PACU  Patient participation: complete - patient participated  Level of consciousness: awake and alert  Pain score: 0    Airway patency: patent  Anesthetic complications: no  Cardiovascular status: hemodynamically stable  Respiratory status: acceptable  Hydration status: euvolemic    PONV: none          No notable events documented.     Nurse Pain Score: 4 (NPRS)

## 2025-05-24 NOTE — PROGRESS NOTES
1530 - Report received from Alvina ROMAN, care assumed.     1640 - Pt wheelchaired and ambulated to OR 1 in stable condition at this time     1738 - Pt transferred to PACU 1 in stable condition via joaquina     1900 - Report given to RN, care transferred.

## 2025-05-24 NOTE — OP REPORT
DATE OF SERVICE:  2025     OPERATION:  Repeat low transverse  section.     SURGEON:  Dr. Stuart.     ASSISTANT:  Taylor Abrams.     ANESTHESIOLOGIST:  Dr. Sheila Escalera.     ANESTHESIA:  Spinal.     PREOPERATIVE DIAGNOSES:  1.  38 and 5/7th weeks gestation.  2.  Diet-controlled gestational diabetes mellitus.  3.  Previous  section.  4.  Preeclampsia without severe features.     POSTOPERATIVE DIAGNOSES:  1.  38 and 5/7th weeks gestation.  2.  Diet-controlled gestational diabetes mellitus.  3.  Previous  section.  4.  Preeclampsia without severe features.     COMPLICATIONS:  None.     ESTIMATED BLOOD LOSS:  600 mL.     FINDINGS:  Baby --- female, 1 minute Apgar 8, 5 minute Apgar 9.  Weight 3315 g.     INDICATIONS:  This 39-year-old lady is now G2, P2.  She was scheduled for   repeat  section 5 days from now at 39 and 3/7th weeks.  She presented   to the office today with persistent hypertension and edema.  She was evaluated   on labor and delivery where she was found to be significantly proteinuric,   with a urine protein-to-creatinine ratio of 314.  Renal function, liver   enzymes, and platelets were normal.  Fetal monitoring was reassuring.  Because   of a new diagnosis of preeclampsia, I recommended proceeding with delivery   today rather than waiting .  The patient also has gestational   diabetes, which has been reasonably well controlled on diet.     DESCRIPTION OF PROCEDURE:  The patient went to the OR.  Spinal anesthesia was   administered.  She was prepped and draped.  Timeout was done.  I made a   Pfannenstiel skin incision by excising her Pfannenstiel scar with elliptical   incisions.  I incised the subcutaneous fat.  I incised the rectus fascia   transversely.  I  the fascia from the rectus muscles above and below.    I entered the peritoneum in the midline well away from the bladder.  The   peritoneal incision was enlarged and an Jose O retractor was  placed.  The   lower uterus was intact and it was not unusually thin.  There were no pelvic   adhesions noted.  I incised the vesicouterine peritoneum transversely.  I used   a sponge-covered fingertip to make sure the peritoneum and bladder were   remote from my proposed hysterotomy site.  I made a low transverse myometrial   incision with a scalpel.  I pierced the membranes with a hemostat revealing   clear fluid.  The baby's head was extracted from left occiput transverse   position with no difficulty.  I bulb suctioned the baby's mouth.  There were   no nuchal cords.  The shoulders and body delivered easily.  30 seconds later,   the cord was doubly clamped and cut.  The baby was handed off.  Gentle cord   traction yielded the intact placenta and trailing membranes.  I sponge   curetted the endometrial cavity to ensure that there were no retained products   of conception.  I closed the myometrium with full-thickness running   interlocking 0 Vicryl suture, obtaining excellent approximation and   hemostasis.  Both fallopian tubes and ovaries were inspected.  There were no   adnexal masses evident.  I did note the presence of a 7 cm diameter left   lateral uterine fibroid, left in situ.     After confirming good hemostasis, the Jose O was removed.  I closed the   anterior parietal peritoneum and the posterior layer of the rectus sheath with   running 2-0 Vicryl.  I closed both layers of the rectus abdominis fascia with   running 0 PDS.  I closed Yodit's fascia with running 3-0 Vicryl.  I closed   the skin with Insorb reabsorbable subcutaneous staples.  One-half-inch white   Steri-Strips and a Mepilex AG dressing were placed.  Sponge and needle counts   were correct.        ______________________________  MD JAYDON Daley/MARIA R    DD:  05/23/2025 17:51  DT:  05/23/2025 18:12    Job#:  [id number]

## 2025-05-24 NOTE — PROGRESS NOTES
"POD #1 s/p RLTCS    PREOPERATIVE DIAGNOSES:  1.  38 and 5/7th weeks gestation.  2.  Diet-controlled gestational diabetes mellitus.  3.  Previous  section.  4.  Preeclampsia without severe features.     POSTOPERATIVE DIAGNOSES:  1.  38 and 5/7th weeks gestation.  2.  Diet-controlled gestational diabetes mellitus.  3.  Previous  section.  4.  Preeclampsia without severe features.    S:  Doing well. She has not really been up out of bed walking and wants to take that at her own pace.Discussed my recommendation for her to get up out of bed to ambulate. She still has the mora catheter. She is asking about her vaginal bleeding. She is concerned that her wound dressing will cause blisters.  She is trying to breast feed but also wants to give formula.    O:  /85   Pulse 90   Temp 36.2 °C (97.1 °F) (Temporal)   Resp 18   Ht 1.549 m (5' 1\")   Wt 83 kg (183 lb)   SpO2 95%     A, A, and O x 3 NAD    FF u/1    Incision: clean/dry/intact.  Mepilex dressing in place.    No c/c/e     Latest Reference Range & Units / 13:51 / 06:09   WBC 4.8 - 10.8 K/uL 8.4 11.1 (H)   RBC 4.20 - 5.40 M/uL 4.91 4.26   Hemoglobin 12.0 - 16.0 g/dL 13.7 11.9 (L)   Hematocrit 37.0 - 47.0 % 42.8 37.4   MCV 81.4 - 97.8 fL 87.2 87.8   MCH 27.0 - 33.0 pg 27.9 27.9   MCHC 32.2 - 35.5 g/dL 32.0 (L) 31.8 (L)   RDW 35.9 - 50.0 fL 45.6 46.3   Platelet Count 164 - 446 K/uL 172 136 (L)   MPV 9.0 - 12.9 fL 12.3 12.2   (H): Data is abnormally high  (L): Data is abnormally low    A/P: POD #1 s/p RLTCS.     Ambulate TID.  ADAT.  Continue cares.  Work on breast feeding and lactation consultant to see patient.  Work on pain management.  D/C mora catheter.  "

## 2025-05-24 NOTE — PROGRESS NOTES
"45-62\" abdominal binder sent to tube station 32    Contact traction for any questions or concerns.   "

## 2025-05-24 NOTE — OR SURGEON
Immediate Post OP Note  PreOp Diagnosis:   1.  A 38 5/7 week gestation.  2.  Diet controlled gestational diabetes.  3.  Previous  section.  4.  Preeclampsia without severe features        PostOp Diagnosis:     1.  A 38 5/7 week gestation.  2.  Diet controlled gestational diabetes.  3.  Previous  section.  4.  Preeclampsia without severe features      Procedure(s):   SECTION, REPEAT    Surgeon(s):  Nikolay Stuart M.D., Nadira Abrams MD    Anesthesiologist/Type of Anesthesia:  Anesthesiologist: Sheila Escalera M.D./Spinal    Surgical Staff:  * No surgical staff found *    Specimens removed if any:  * No specimens in log *    Estimated Blood Loss: 600 cc    Findings: baby-female, APGARs 8-9, 3315 grams    Complications: none        2025 5:40 PM Nikolay Stuart M.D.

## 2025-05-24 NOTE — CARE PLAN
The patient is Stable - Low risk of patient condition declining or worsening    Shift Goals  Clinical Goals: Vss, fundus firm, lochia wdl, breastfeed  Patient Goals: breastfeed, bond  Family Goals: support and bond    Progress made toward(s) clinical / shift goals: Plan of care and medications discussed and reviewed over with pt. Education on ambulation after surgery, use of IS, and pain medications given to pt. Pt verbalized understanding. Pain controlled at this time. Stable vs, fundus is firm and bleeding is light. Pt able to verbalize needs. Parents demonstrated understanding of infant care    Patient is not progressing towards the following goals:      Problem: Knowledge Deficit - Standard  Goal: Patient and family/care givers will demonstrate understanding of plan of care, disease process/condition, diagnostic tests and medications  Outcome: Progressing     Problem: Pain - Standard  Goal: Alleviation of pain or a reduction in pain to the patient’s comfort goal  Outcome: Progressing     Problem: Altered physiologic condition related to postoperative  delivery  Goal: Patient physiologically stable as evidenced by normal lochia, palpable uterine involution and vital signs within normal limits  Outcome: Progressing     Problem: Alteration in comfort related to surgical incision and/or after birth pains  Goal: Patient is able to ambulate, care for self and infant with acceptable pain level  Outcome: Progressing  Goal: Patient verbalizes acceptable pain level  Outcome: Progressing     Problem: Potential knowledge deficit related to lack of understanding of self and  care  Goal: Patient will verbalize understanding of self and infant care  Outcome: Progressing  Goal: Patient will demonstrate ability to care for self and infant  Outcome: Progressing

## 2025-05-25 PROCEDURE — A9270 NON-COVERED ITEM OR SERVICE: HCPCS | Performed by: OBSTETRICS & GYNECOLOGY

## 2025-05-25 PROCEDURE — 770002 HCHG ROOM/CARE - OB PRIVATE (112)

## 2025-05-25 PROCEDURE — 700102 HCHG RX REV CODE 250 W/ 637 OVERRIDE(OP): Performed by: OBSTETRICS & GYNECOLOGY

## 2025-05-25 PROCEDURE — 306313 ANTI-EMBOLISM STOCKINGS XXXLG REG: Performed by: OBSTETRICS & GYNECOLOGY

## 2025-05-25 RX ADMIN — OXYCODONE HYDROCHLORIDE 10 MG: 10 TABLET ORAL at 13:35

## 2025-05-25 RX ADMIN — LEVOTHYROXINE SODIUM 88 MCG: 0.09 TABLET ORAL at 07:14

## 2025-05-25 RX ADMIN — IBUPROFEN 800 MG: 800 TABLET, FILM COATED ORAL at 07:15

## 2025-05-25 RX ADMIN — PRENATAL WITH FERROUS FUM AND FOLIC ACID 1 TABLET: 3080; 920; 120; 400; 22; 1.84; 3; 20; 10; 1; 12; 200; 27; 25; 2 TABLET ORAL at 07:16

## 2025-05-25 RX ADMIN — ACETAMINOPHEN 1000 MG: 500 TABLET ORAL at 18:25

## 2025-05-25 RX ADMIN — DOCUSATE SODIUM 100 MG: 100 CAPSULE, LIQUID FILLED ORAL at 21:08

## 2025-05-25 RX ADMIN — METFORMIN HYDROCHLORIDE 1000 MG: 500 TABLET ORAL at 18:25

## 2025-05-25 RX ADMIN — OXYCODONE 5 MG: 5 TABLET ORAL at 07:15

## 2025-05-25 RX ADMIN — MAGNESIUM HYDROXIDE 30 ML: 2400 SUSPENSION ORAL at 22:43

## 2025-05-25 RX ADMIN — OXYCODONE 5 MG: 5 TABLET ORAL at 22:36

## 2025-05-25 RX ADMIN — DOCUSATE SODIUM 100 MG: 100 CAPSULE, LIQUID FILLED ORAL at 07:16

## 2025-05-25 RX ADMIN — ACETAMINOPHEN 1000 MG: 500 TABLET ORAL at 12:07

## 2025-05-25 RX ADMIN — OXYCODONE HYDROCHLORIDE 10 MG: 10 TABLET ORAL at 18:25

## 2025-05-25 RX ADMIN — SIMETHICONE 125 MG: 125 TABLET, CHEWABLE ORAL at 13:35

## 2025-05-25 RX ADMIN — ACETAMINOPHEN 1000 MG: 500 TABLET ORAL at 03:57

## 2025-05-25 RX ADMIN — IBUPROFEN 800 MG: 800 TABLET, FILM COATED ORAL at 15:56

## 2025-05-25 ASSESSMENT — PAIN DESCRIPTION - PAIN TYPE
TYPE: ACUTE PAIN;SURGICAL PAIN
TYPE: SURGICAL PAIN;ACUTE PAIN
TYPE: ACUTE PAIN;SURGICAL PAIN
TYPE: SURGICAL PAIN;ACUTE PAIN
TYPE: SURGICAL PAIN
TYPE: SURGICAL PAIN;ACUTE PAIN

## 2025-05-25 ASSESSMENT — EDINBURGH POSTNATAL DEPRESSION SCALE (EPDS)
THE THOUGHT OF HARMING MYSELF HAS OCCURRED TO ME: NEVER
I HAVE BEEN SO UNHAPPY THAT I HAVE BEEN CRYING: ONLY OCCASIONALLY
I HAVE BLAMED MYSELF UNNECESSARILY WHEN THINGS WENT WRONG: NOT VERY OFTEN
I HAVE BEEN SO UNHAPPY THAT I HAVE HAD DIFFICULTY SLEEPING: NOT VERY OFTEN
THINGS HAVE BEEN GETTING ON TOP OF ME: NO, MOST OF THE TIME I HAVE COPED QUITE WELL
I HAVE FELT SCARED OR PANICKY FOR NO GOOD REASON: NO, NOT MUCH
I HAVE BEEN ANXIOUS OR WORRIED FOR NO GOOD REASON: YES, SOMETIMES
I HAVE FELT SAD OR MISERABLE: NOT VERY OFTEN
I HAVE BEEN ABLE TO LAUGH AND SEE THE FUNNY SIDE OF THINGS: NOT AT ALL
I HAVE LOOKED FORWARD WITH ENJOYMENT TO THINGS: RATHER LESS THAN I USED TO

## 2025-05-25 NOTE — CARE PLAN
The patient is Stable - Low risk of patient condition declining or worsening    Shift Goals  Clinical Goals: Pain control, lochia remain WDL, VSS    Progress made toward(s) clinical / shift goals:  Patient pain well controlled with rest, repositioning, and medication as needed/scheduled. Ambulating, voiding, and tolerating PO wel lat this itme. No s/sx of infection observed. SO at bedside helpful with patient and infant care.      Problem: Potential for postpartum infection related to surgical incision, compromised uterine condition, urinary tract or respiratory compromise  Goal: Patient will be afebrile and free from signs and symptoms of infection  Outcome: Progressing     Problem: Alteration in comfort related to surgical incision and/or after birth pains  Goal: Patient is able to ambulate, care for self and infant with acceptable pain level  Outcome: Progressing  Goal: Patient verbalizes acceptable pain level  Outcome: Progressing     Problem: Psychosocial - Postpartum  Goal: Patient will verbalize and demonstrate effective bonding and parenting behavior  Outcome: Progressing       Patient is not progressing towards the following goals:

## 2025-05-25 NOTE — PROGRESS NOTES
0700- Received report from JESSIE Reveles. Assumed care. 12 hour chart check, MAR and orders reviewed.     0800- MOB requesting to rest at this time/eat breakfast    1000- LC at bedside working with MOB on breastfeeding.    1100- Pediatrician rounding with parents at infant.    1200- Assessment complete. Fundus firm and palpable, lochia scant to light rubra. Pain management and interventions discussed with pt. SO at bedside. Patient refusing to ambulate at this time. Discussed importance of ambulating after surgery with patient. Mosley catheter in place, adequate output. POC discussed. All questions and concerns discussed. No further concerns.

## 2025-05-25 NOTE — LACTATION NOTE
This note was copied from a baby's chart.  Met with Milli for a follow up lactation consultation. She reports that her baby was supplemented with formula overnight for low blood sugar. She expressed doubts that her colostrum is providing enough volume for her baby.     LC discussed the milk making process, and supply in demand. LC also reviewed the importance of supplementing when it is medically indicated. At this time, Milli expresses a desire to supplement baby after breast feeding to ensure that she is getting enough volume.     LC discussed with mom offering the breast before offering bottles to baby. This can ensure that the breasts are being stimulated to bring in lactogenesis 2. However, when supplementing it is natural for this stimulation to decrease. LC offered to mom a hand pump to add additional stimulation while dad offers a bottle to baby.     Milli reports that baby appeared to be frustrated at the breast overnight, and it was difficult to get her to maintain a latch at the breast before she would pull off and cry. LC discussed, and demonstrated paced bottle feeding, to mimic the flow of milk from the breast, and encouraged parents to use this bottle feeding technique when offering formula to baby. This will help to avoid a flow preference.     LC demonstrated hand expression, and drops of colostrum were expressed and flowed from each breast. Milli reports that this does make her feel better to see some colostrum being expressed. Milli was able to return demonstrate with good technique.     Plan: Continue to breastfeed Barbara per her hunger cues, with a minimum of one feeding every three hours. Supplement with formula per mom's preference using paced bottle feeding technique. Hand pump/hand express when supplementing to optimize breast stimulation.

## 2025-05-25 NOTE — PROGRESS NOTES
"POD #2 RLTCS    PREOPERATIVE DIAGNOSES:  1.  38 and 5/7th weeks gestation.  2.  Diet-controlled gestational diabetes mellitus.  3.  Previous  section.  4.  Preeclampsia without severe features.     POSTOPERATIVE DIAGNOSES:  1.  38 and 5/7th weeks gestation.  2.  Diet-controlled gestational diabetes mellitus.  3.  Previous  section.  4.  Preeclampsia without severe features.    S:  Doing well.  Pain is well controlled. She is working on breast feeding.  She has ambulated. She denies nausea/vomiting/fevers/chills/night sweats.  She has moderate lochia.  She is voiding spontaneously, passing gas, and tolerating a regular diet.      O:  /80   Pulse 92   Temp 36.6 °C (97.8 °F) (Temporal)   Resp 16   Ht 1.549 m (5' 1\")   Wt 83 kg (183 lb)   SpO2 97%     A, A, and O x 3 NAD    FF u/1    No c/c/e    Recent Labs     05//  1351 05//  0609   WBC 8.4 11.1*   RBC 4.91 4.26   HEMOGLOBIN 13.7 11.9*   HEMATOCRIT 42.8 37.4   MCV 87.2 87.8   MCH 27.9 27.9   RDW 45.6 46.3   PLATELETCT 172 136*   MPV 12.3 12.2   NEUTSPOLYS 64.60  --    LYMPHOCYTES 25.80  --    MONOCYTES 8.00  --    EOSINOPHILS 0.80  --    BASOPHILS 0.20  --      AP:  POD #2 s/p RLTCS.    Ambulate TID.  ADAT.  Continue cares.  Work on breast feeding today.  Anticipate D/C tomrrow.    "

## 2025-05-25 NOTE — PROGRESS NOTES
Bedside report received from JESSIE Allen. Pt in bed resting comfortably at this time. Pt able to get up to restroom and void independently, denies need for pain medication at this time. Pt states that she will call if additional pain medication is needed. Whiteboard updated. POC discussed. Call light within reach. All questions and concerns answered at this time, advised to call for assistance as needed.

## 2025-05-25 NOTE — CARE PLAN
Problem: Knowledge Deficit - Postpartum  Goal: Patient will verbalize and demonstrate understanding of self and infant care  Outcome: Progressing     Problem: Psychosocial - Postpartum  Goal: Patient will verbalize and demonstrate effective bonding and parenting behavior  Outcome: Progressing     Problem: Potential anxiety related to difficulty adapting to parental role  Goal: Patient will verbalize and demonstrate effective bonding and parenting behavior  5/25/2025 0003 by Yamilex Vazquez R.NManuel  Outcome: Progressing  5/25/2025 0002 by Yamilex Vazquez R.NManuel  Outcome: Progressing     Problem: Infection - Postpartum  Goal: Postpartum patient will be free of signs and symptoms of infection  Outcome: Progressing   The patient is Stable - Low risk of patient condition declining or worsening    Shift Goals  Clinical Goals: Pain control, lochia remain WDL, VSS  Patient Goals: breastfeed, bond  Family Goals: support and bond    Progress made toward(s) clinical / shift goals:      Patient is not progressing towards the following goals:

## 2025-05-25 NOTE — DISCHARGE PLANNING
:    Received consult-MOB scored 12 on the EPDS screen.  Reviewed chart and MOB was provided post partum support and counseling resources on 5/24/25 by Briana Crespo.  Discussed with RN.  Pt is cleared for discharge.

## 2025-05-25 NOTE — PROGRESS NOTES
Assessment complete. Fundus firm and palpable, lochia scant rubra. Pain management and interventions discussed with pt. SO at bedside. Infant to NBN for an hour for parents to sleep. POC discussed. Abdominal binder in use at this time. All questions and concerns discussed. No further concerns.

## 2025-05-26 ENCOUNTER — PHARMACY VISIT (OUTPATIENT)
Dept: PHARMACY | Facility: MEDICAL CENTER | Age: 40
End: 2025-05-26
Payer: COMMERCIAL

## 2025-05-26 VITALS
RESPIRATION RATE: 17 BRPM | WEIGHT: 183 LBS | HEART RATE: 91 BPM | SYSTOLIC BLOOD PRESSURE: 119 MMHG | OXYGEN SATURATION: 98 % | BODY MASS INDEX: 34.55 KG/M2 | HEIGHT: 61 IN | DIASTOLIC BLOOD PRESSURE: 79 MMHG | TEMPERATURE: 97 F

## 2025-05-26 PROCEDURE — A9270 NON-COVERED ITEM OR SERVICE: HCPCS | Performed by: OBSTETRICS & GYNECOLOGY

## 2025-05-26 PROCEDURE — 700102 HCHG RX REV CODE 250 W/ 637 OVERRIDE(OP): Performed by: OBSTETRICS & GYNECOLOGY

## 2025-05-26 PROCEDURE — RXMED WILLOW AMBULATORY MEDICATION CHARGE: Performed by: OBSTETRICS & GYNECOLOGY

## 2025-05-26 RX ORDER — OXYCODONE HYDROCHLORIDE 5 MG/1
5 TABLET ORAL EVERY 4 HOURS PRN
Qty: 30 TABLET | Refills: 0 | Status: SHIPPED | OUTPATIENT
Start: 2025-05-26 | End: 2025-06-02

## 2025-05-26 RX ORDER — PSEUDOEPHEDRINE HCL 30 MG
100 TABLET ORAL 2 TIMES DAILY PRN
Qty: 60 CAPSULE | Refills: 1 | Status: SHIPPED | OUTPATIENT
Start: 2025-05-26

## 2025-05-26 RX ORDER — SIMETHICONE 125 MG
125 TABLET,CHEWABLE ORAL 4 TIMES DAILY PRN
Qty: 120 TABLET | Refills: 1 | Status: SHIPPED | OUTPATIENT
Start: 2025-05-26

## 2025-05-26 RX ORDER — IBUPROFEN 800 MG/1
800 TABLET, FILM COATED ORAL EVERY 8 HOURS PRN
Qty: 30 TABLET | Refills: 1 | Status: SHIPPED | OUTPATIENT
Start: 2025-05-26

## 2025-05-26 RX ORDER — ACETAMINOPHEN 500 MG
500-1000 TABLET ORAL EVERY 6 HOURS PRN
Qty: 30 TABLET | Refills: 1 | Status: SHIPPED | OUTPATIENT
Start: 2025-05-26

## 2025-05-26 RX ADMIN — LEVOTHYROXINE SODIUM 88 MCG: 0.09 TABLET ORAL at 07:33

## 2025-05-26 RX ADMIN — DOCUSATE SODIUM 100 MG: 100 CAPSULE, LIQUID FILLED ORAL at 10:19

## 2025-05-26 RX ADMIN — OXYCODONE 5 MG: 5 TABLET ORAL at 11:32

## 2025-05-26 RX ADMIN — ACETAMINOPHEN 1000 MG: 500 TABLET ORAL at 01:47

## 2025-05-26 RX ADMIN — PRENATAL WITH FERROUS FUM AND FOLIC ACID 1 TABLET: 3080; 920; 120; 400; 22; 1.84; 3; 20; 10; 1; 12; 200; 27; 25; 2 TABLET ORAL at 08:10

## 2025-05-26 RX ADMIN — IBUPROFEN 800 MG: 800 TABLET, FILM COATED ORAL at 10:19

## 2025-05-26 RX ADMIN — ACETAMINOPHEN 1000 MG: 500 TABLET ORAL at 08:10

## 2025-05-26 RX ADMIN — IBUPROFEN 800 MG: 800 TABLET, FILM COATED ORAL at 01:47

## 2025-05-26 RX ADMIN — SIMETHICONE 125 MG: 125 TABLET, CHEWABLE ORAL at 11:32

## 2025-05-26 RX ADMIN — SIMETHICONE 125 MG: 125 TABLET, CHEWABLE ORAL at 01:51

## 2025-05-26 RX ADMIN — OXYCODONE 5 MG: 5 TABLET ORAL at 02:41

## 2025-05-26 ASSESSMENT — SOCIAL DETERMINANTS OF HEALTH (SDOH)
WITHIN THE LAST YEAR, HAVE TO BEEN RAPED OR FORCED TO HAVE ANY KIND OF SEXUAL ACTIVITY BY YOUR PARTNER OR EX-PARTNER?: NO
WITHIN THE LAST YEAR, HAVE YOU BEEN AFRAID OF YOUR PARTNER OR EX-PARTNER?: NO
WITHIN THE PAST 12 MONTHS, THE FOOD YOU BOUGHT JUST DIDN'T LAST AND YOU DIDN'T HAVE MONEY TO GET MORE: NEVER TRUE
IN THE PAST 12 MONTHS, HAS THE ELECTRIC, GAS, OIL, OR WATER COMPANY THREATENED TO SHUT OFF SERVICE IN YOUR HOME?: NO
WITHIN THE LAST YEAR, HAVE YOU BEEN KICKED, HIT, SLAPPED, OR OTHERWISE PHYSICALLY HURT BY YOUR PARTNER OR EX-PARTNER?: NO
WITHIN THE LAST YEAR, HAVE YOU BEEN HUMILIATED OR EMOTIONALLY ABUSED IN OTHER WAYS BY YOUR PARTNER OR EX-PARTNER?: NO
WITHIN THE PAST 12 MONTHS, YOU WORRIED THAT YOUR FOOD WOULD RUN OUT BEFORE YOU GOT THE MONEY TO BUY MORE: NEVER TRUE

## 2025-05-26 ASSESSMENT — PAIN DESCRIPTION - PAIN TYPE
TYPE: SURGICAL PAIN;ACUTE PAIN
TYPE: SURGICAL PAIN;ACUTE PAIN
TYPE: ACUTE PAIN;SURGICAL PAIN
TYPE: ACUTE PAIN;SURGICAL PAIN
TYPE: SURGICAL PAIN;ACUTE PAIN

## 2025-05-26 NOTE — CARE PLAN
The patient is Stable - Low risk of patient condition declining or worsening    Shift Goals  Clinical Goals: VSS, Fundus firm with light lochia, pain control  Patient Goals:   Family Goals:     Progress made toward(s) clinical / shift goals:  VSS. Fundus firm with minimal lochia.     Patient is not progressing towards the following goals: Patient with increased pain, was taking oxy 10 q 4 hrs along with scheduled tylenol and motrin. Educated for home care and encouraged patient to try and wean from 10 mg to 5 mg. Patient encouraged to ambulate in hallways, drink lots of fluid to prevent constipation.

## 2025-05-26 NOTE — PROGRESS NOTES
Discharge paperwork for infant and mom discussed at bedside. All questions answered. Follow-up appointments reviewed. Parents aware of NBS #2 and dates to complete it by. Paperwork signed and dated at this time.    1135- Patient and infant discharged with escort off unit. Infant discharged in car seat, patient declined wheelchair and wanted to ambulate. Infant placed in car seat by parents and checked by RN. Cuddles removed. Bands verified. All questions answered at this time.

## 2025-05-26 NOTE — DISCHARGE SUMMARY
Discharge Summary:      Milli Santizo    Admit Date:   2025  Discharge Date:  2025     Admitting diagnosis:   delivery affecting  [P03.4]  Discharge Diagnosis: Status post RLTCS    PREOPERATIVE DIAGNOSES:  1.  38 and 5/7th weeks gestation.  2.  Diet-controlled gestational diabetes mellitus.  3.  Previous  section.  4.  Preeclampsia without severe features.     POSTOPERATIVE DIAGNOSES:  1.  38 and 5/7th weeks gestation.  2.  Diet-controlled gestational diabetes mellitus.  3.  Previous  section.  4.  Preeclampsia without severe features.        History:  Past Medical History[1]  OB History    Para Term  AB Living   2 2 2   2   SAB IAB Ectopic Molar Multiple Live Births       0 2      # Outcome Date GA Lbr Robert/2nd Weight Sex Type Anes PTL Lv   2 Term 25 38w5d  3.315 kg (7 lb 4.9 oz) F CS-LTranv Spinal N NELDA   1 Term 19 39w1d  3.475 kg (7 lb 10.6 oz) F CS-LTranv EPI N NELDA        Patient has no known allergies.  Patient Active Problem List    Diagnosis Date Noted     delivery affecting  2025    SHIMON (obstructive sleep apnea) 10/18/2022    Impaired fasting glucose 2021    Hypothyroidism due to Hashimoto's thyroiditis 2021    History of hypothyroidism 2020    Lactation problem 2020    Lactation suppression 2020    Slow transit constipation 2019    Normocytic anemia 2019    Vitamin D insufficiency 2019    Chronic fatigue 2019    PCOS (polycystic ovarian syndrome) 2018    Meralgia paresthetica of right side 2017    Generalized anxiety disorder 2017    Hypothyroidism (acquired) 2017    Acne vulgaris 2017    Psychologic vaginismus 2017    Family planning 2013    Positive PPD         Hospital Course:   39 y.o. , now para 2, was admitted with the above mentioned diagnosis, underwent RLTCS.   Patient postpartum course was unremarkable,  with progressive advancement in diet , ambulation and toleration of oral analgesia. Patient without complaints today and desires discharge.      Vitals:    05/24/25 1830 05/25/25 0715 05/25/25 1812 05/26/25 0600   BP: 122/80  129/82 119/79   Pulse: 92  85 91   Resp:  16 16 18   Temp: 36.6 °C (97.8 °F)  36.9 °C (98.4 °F) 36.1 °C (97 °F)   TempSrc: Temporal  Temporal Temporal   SpO2: 97%  92% 98%   Weight:       Height:           Current Facility-Administered Medications   Medication Dose    menthol (Halls) lozenge 1 Lozenge  1 Lozenge    oxyCODONE immediate release (Roxicodone) tablet 10 mg  10 mg    lactated ringers infusion      NS infusion      oxytocin (Pitocin) infusion (for post delivery)  125 mL/hr    oxytocin (Pitocin) injection 10 Units  10 Units    miSOPROStol (Cytotec) tablet 800 mcg  800 mcg    carboPROST (Hemabate) injection 250 mcg  250 mcg    levothyroxine (Synthroid) tablet 88 mcg  88 mcg    lactated ringers infusion  2,000 mL    ibuprofen (Motrin) tablet 800 mg  800 mg    Followed by    [START ON 5/27/2025] ibuprofen (Motrin) tablet 800 mg  800 mg    acetaminophen (Tylenol) tablet 1,000 mg  1,000 mg    Followed by    [START ON 5/28/2025] acetaminophen (Tylenol) tablet 1,000 mg  1,000 mg    oxyCODONE immediate-release (Roxicodone) tablet 5 mg  5 mg    ondansetron (Zofran) syringe/vial injection 4 mg  4 mg    Or    ondansetron (Zofran ODT) dispertab 4 mg  4 mg    diphenhydrAMINE (Benadryl) tablet/capsule 25 mg  25 mg    Or    diphenhydrAMINE (Benadryl) injection 25 mg  25 mg    docusate sodium (Colace) capsule 100 mg  100 mg    NIFEdipine IR (Procardia) capsule 10 mg  10 mg    magnesium hydroxide (Milk Of Magnesia) suspension 30 mL  30 mL    prenatal plus vitamin (Stuartnatal 1+1) 27-1 MG tablet 1 Tablet  1 Tablet    simethicone (Mylicon) chewable tablet 125 mg  125 mg    calcium carbonate (Tums) chewable tab 1,000 mg  1,000 mg    metFORMIN (Glucophage) tablet 1,000 mg  1,000 mg       Exam:    BP  "119/79   Pulse 91   Temp 36.1 °C (97 °F) (Temporal)   Resp 18   Ht 1.549 m (5' 1\")   Wt 83 kg (183 lb)   SpO2 98%     A, A, and O x 3 NAD    FF u/1    No c/c/1+ edema.  Normal reflexes and no clonus.    Incision: clean/dry/intact.  Mepilex dressing in place.     Labs:  Recent Labs     05/23/25  1351 05/24/25  0609   WBC 8.4 11.1*   RBC 4.91 4.26   HEMOGLOBIN 13.7 11.9*   HEMATOCRIT 42.8 37.4   MCV 87.2 87.8   MCH 27.9 27.9   MCHC 32.0* 31.8*   RDW 45.6 46.3   PLATELETCT 172 136*   MPV 12.3 12.2        Activity:   Discharge to home  Pelvic Rest x 6 weeks  Ambulate TID  Continue prenatal vitamins while breast feeding.    Assessment:  Stable and ready for discharge.  Needs Interdry wicking fabric upon discharge.     Follow up: Dr. Stuart in 2 weeks for a BP check and incision check and again in 6 weeks for a postpartum appointment.     Discharge Meds:   Current Outpatient Medications   Medication Sig Dispense Refill    metFORMIN (GLUCOPHAGE) 1000 MG tablet Take 1 Tablet by mouth every day. 100 Tablet 3    docusate sodium 100 MG Cap Take 100 mg by mouth 2 times a day as needed for Constipation. 60 Capsule 1    oxyCODONE immediate-release (ROXICODONE) 5 MG Tab Take 1 Tablet by mouth every four hours as needed for Severe Pain for up to 7 days. 30 Tablet 0    simethicone (MYLICON) 125 MG chewable tablet Chew 1 Tablet 4 times a day as needed for Flatulence. 120 Tablet 1    ibuprofen (MOTRIN) 800 MG Tab Take 1 Tablet by mouth every 8 hours as needed for Moderate Pain. 30 Tablet 1    acetaminophen (TYLENOL) 500 MG Tab Take 1-2 Tablets by mouth every 6 hours as needed for Moderate Pain. 30 Tablet 1       Camila Matias M.D.               [1]   Past Medical History:  Diagnosis Date    Anxiety     Diabetes (HCC) 05/13/2025    GESTATIONAL    Gestational diabetes insipidus (HCC)     pt report x 1 mo ago    PCOS (polycystic ovarian syndrome)     Positive PPD     Thyroid disease      "

## 2025-05-26 NOTE — PROGRESS NOTES
Received bedside report from JESSIE Coronel. Patient in restroom trying to have BM. Patient recently received pain medication and declines any needs at this time. Whiteboards updated, POC discussed. Call light within reach. Patient encouraged to call with any needs and or concerns.       2000: Assessment done. Patient wanting to sleep tonight and care to be clustered. Discussed need for VS for baby and mother agrees. Plan for medication, patient will call if needing pain medication. Times medication are available written on whiteboard. Patient encouraged to call with any needs and or concerns.

## 2025-05-26 NOTE — PROGRESS NOTES
0700- Received report from JESSIE Dean. Assumed care. 12 hour chart check, MAR and orders reviewed.      0730- Assessment complete. Fundus firm and palpable, lochia scant to light rubra. Pain management and interventions discussed with pt. SO at bedside. POC discussed. Infant feeding well, mainly bottle feeding. Tolerating formula. All questions and concerns discussed. No further concerns.    0800- Patient states she had a bowel movement at this time.

## 2025-05-26 NOTE — LACTATION NOTE
This note was copied from a baby's chart.  Met with Milli for a follow up lactation consultation. She has been breastfeeding and supplementing with formula. Baby is latched during this consultation in cross cradle position on the right breast, mom achieved this latch independently. Milli reports that they had a better night in terms of feeding last night and is feeling ready to go home today.     MARY encouraged her to continue to breastfeed her baby per her hunger cues, with at least one feeding every three hours. It is her preference to follow up those feedings with a bottle.     MARY encouraged her to follow up with outpatient lactation as desired for continued support after her discharge from the hospital. A referral was sent to outpatient Taylor HERNANDEZ, on Saturday.

## 2025-05-26 NOTE — DISCHARGE INSTRUCTIONS

## 2025-05-28 ENCOUNTER — ANESTHESIA (OUTPATIENT)
Dept: OBGYN | Facility: MEDICAL CENTER | Age: 40
End: 2025-05-28
Payer: COMMERCIAL

## 2025-06-27 ENCOUNTER — TELEPHONE (OUTPATIENT)
Dept: MATERNAL FETAL MEDICINE | Facility: MEDICAL CENTER | Age: 40
End: 2025-06-27
Payer: COMMERCIAL

## 2025-07-10 ENCOUNTER — TELEMEDICINE (OUTPATIENT)
Dept: MEDICAL GROUP | Facility: IMAGING CENTER | Age: 40
End: 2025-07-10
Payer: COMMERCIAL

## 2025-07-10 VITALS — WEIGHT: 163 LBS | HEART RATE: 91 BPM | HEIGHT: 61 IN | BODY MASS INDEX: 30.78 KG/M2

## 2025-07-10 DIAGNOSIS — Z87.39 HISTORY OF LOW BACK PAIN: ICD-10-CM

## 2025-07-10 DIAGNOSIS — Z86.32 HISTORY OF GESTATIONAL DIABETES: ICD-10-CM

## 2025-07-10 DIAGNOSIS — M53.3 TAIL BONE PAIN: ICD-10-CM

## 2025-07-10 DIAGNOSIS — K64.9 HEMORRHOIDS, UNSPECIFIED HEMORRHOID TYPE: ICD-10-CM

## 2025-07-10 PROCEDURE — 98005 SYNCH AUDIO-VIDEO EST LOW 20: CPT | Mod: 95 | Performed by: FAMILY MEDICINE

## 2025-07-10 RX ORDER — HYDROCORTISONE 25 MG/G
1 CREAM TOPICAL 2 TIMES DAILY PRN
Qty: 28 G | Refills: 1 | Status: SHIPPED | OUTPATIENT
Start: 2025-07-10

## 2025-07-10 ASSESSMENT — FIBROSIS 4 INDEX: FIB4 SCORE: 1.51

## 2025-07-10 NOTE — PROGRESS NOTES
Telemedicine: Established Patient   This evaluation was conducted via Teams using secure and encrypted videoconferencing technology. The patient was in their home in the [facility].    The patient's identity was confirmed and verbal consent was obtained for this virtual visit.    Subjective:     Chief Complaint   Patient presents with    Follow-Up     Gestation diabetes     Hemorrhoids     Painful and back back pain    Other     Postpartum        Milli Santizo is a 40 y.o. female presenting for evaluation and management of:    GDM- post partum.  Notes she had a  due to Юлия a CLABSI.  Her blood pressure is now normal.  Had her OB follow-up yesterday.  2 months postpartum she will complete lab work for her glucose per OB orders.    Tail bone pain with sitting, she does have hemorrhoids.  Pain started a few weeks after delivery.  Her baby is now 6 weeks old.  She does get intermittent flares of hemorrhoids.  Would like to see specialist.  She has ordered a new doughnut pillow.  She has not looked at the area for any abrasions or lesions.  Denies any specific injury to the area.  She does tend to have constipation.  Breast feeding.   Supplementing with formula.   No sitz bath.   Baby with colic/reflux, this not getting much sleep.   She does have a topical Tylenol available.  Has had history of lower back pain with a lot of work or with standing a lot.  She does have Bengay to use if needed.  After Bengay and rest she is better      ROS  Denies any recent fevers or chills. No nausea or vomiting. No diarrhea. No chest pains or shortness of breath. No lower extremity edema.        Allergies[1]    Current medicines (including changes today)  Current Medications[2]    Patient Active Problem List    Diagnosis Date Noted     delivery affecting  2025    SHIMON (obstructive sleep apnea) 10/18/2022    Impaired fasting glucose 2021    Hypothyroidism due to Hashimoto's thyroiditis  "2021    History of hypothyroidism 2020    Lactation problem 2020    Lactation suppression 2020    Slow transit constipation 2019    Normocytic anemia 2019    Vitamin D insufficiency 2019    Chronic fatigue 2019    PCOS (polycystic ovarian syndrome) 2018    Meralgia paresthetica of right side 2017    Generalized anxiety disorder 2017    Hypothyroidism (acquired) 2017    Acne vulgaris 2017    Psychologic vaginismus 2017    Family planning 2013    Positive PPD        Family History   Problem Relation Age of Onset    Arthritis Mother     Thyroid Mother         hypothyroid    Hypertension Father     Arthritis Maternal Grandmother     Cancer Maternal Grandmother         GI or gyn cancer    Hypertension Paternal Grandmother        She  has a past medical history of Anxiety, Diabetes (HCC) (2025), Gestational diabetes insipidus (HCC), PCOS (polycystic ovarian syndrome), Positive PPD, and Thyroid disease.    She has no past medical history of Alcohol abuse, Alcoholism ([facility]), Depression, Psychosis ([facility]), or Substance abuse ([facility]).  She  has a past surgical history that includes pr  delivery only (N/A, 2019) and repeat c section (N/A, 2025).       Objective:   Pulse 91 Comment: 25  Ht 1.549 m (5' 1\")   Wt 73.9 kg (163 lb) Comment: pt report  LMP 2024 Comment: Due 2025  Breastfeeding Yes   BMI 30.80 kg/m²     Physical Exam  Constitutional: Alert, no distress, well-groomed.  Skin: No rashes in visible areas.  Eye: Round. Conjunctiva clear, lids normal. No icterus.   ENMT: Lips pink without lesions, good dentition, moist mucous membranes. Phonation normal.  Neck: No masses, no thyromegaly. Moves freely without pain.  CV: Pulse as reported by patient  Respiratory: Unlabored respiratory effort, no cough or audible wheeze  Psych: Alert and oriented x3, normal affect and mood.       Assessment and " Plan:   The following treatment plan was discussed:     This is a 40 y.o. female     1. Tail bone pain        2. History of low back pain        3. Hemorrhoids, unspecified hemorrhoid type  Referral to Gastroenterology    hydrocortisone rectal (PERIANAL) 2.5 % Cream      4. History of gestational diabetes        5. Breast feeding status of mother        -Tailbone pain, history of low back pain- she is post partum, c section delivery.  No specific injury to the area.  Advised patient to examine area for any other lesions or abrasions.  Recommend regular use of donut pillow and modification of positions/activities.  She may try ice and heat on the area.  Reviewed recommendations for topical therapy otc or oral tylenol as needed.  May consider other therapies in future as needed.  Continue monitor at this time.  -Hemorrhoids-recommend adequate fiber and fluids.  Anusol therapy as needed.  Recommend sitz bath's.  Referral to GI.   -History of gestational diabetes-she will plan to complete lab work per OB and follow-up afterward.      Follow-up: Follow-up in 6 to 8 weeks.           This medical record contains text that has been entered with the assistance of computer voice recognition and dictation software.  Therefore, it may contain unintended errors in text, spelling, punctuation, or grammar.             [1] No Known Allergies  [2]   Current Outpatient Medications   Medication Sig Dispense Refill    metformin (GLUCOPHAGE) 1000 MG tablet Take 1 Tablet by mouth every day. 100 Tablet 3    levothyroxine (SYNTHROID) 88 MCG Tab Take 1 Tablet by mouth every morning on an empty stomach. 90 Tablet 3    docusate sodium 100 MG Cap Take 1 capsule by mouth 2 times a day as needed for Constipation. 60 Capsule 1    simethicone (MYLICON) 125 MG chewable tablet Chew 1 Tablet 4 times a day as needed for Flatulence. 120 Tablet 1    ibuprofen (MOTRIN) 800 MG Tab Take 1 Tablet by mouth every 8 hours as needed for Moderate Pain. 30 Tablet  1    acetaminophen (TYLENOL) 500 MG Tab Take 1-2 Tablets by mouth every 6 hours as needed for Moderate Pain. 30 Tablet 1     No current facility-administered medications for this visit.

## 2025-07-11 ENCOUNTER — TELEPHONE (OUTPATIENT)
Dept: MATERNAL FETAL MEDICINE | Facility: MEDICAL CENTER | Age: 40
End: 2025-07-11
Payer: COMMERCIAL

## 2025-07-16 NOTE — Clinical Note
REFERRAL APPROVAL NOTICE         Sent on July 16, 2025                   Milli Santizo  [address] [facility] [address]                   Dear Ms. Santizo,    After a careful review of the medical information and benefit coverage, [facility] has processed your referral. See below for additional details.    If applicable, you must be actively enrolled with your insurance for coverage of the authorized service. If you have any questions regarding your coverage, please contact your insurance directly.    REFERRAL INFORMATION   Referral #:  [id number]  Referred-To Provider    Referred-By Provider:  Gastroenterology    Kylie Lamb M.D.   [facility]      [address] [facility] [address]  [phone] [address] [facility] [address]  [phone]    Referral Start Date:  07/10/2025  Referral End Date:   07/10/2026             SCHEDULING  If you do not already have an appointment, please call [phone] to make an appointment.     MORE INFORMATION  If you do not already have a [facility] account, sign up at: [email or web address]  You can access your medical information, make appointments, see lab results, billing information, and more.  If you have questions regarding this referral, please contact  the [facility] Referrals department at:             [phone]. Monday - Friday 8:00AM - 5:00PM.     Sincerely,    [facility]

## (undated) DEVICE — SUTURE 3-0 VICRYL PLUS CT-1 - 36 INCH (36/BX)

## (undated) DEVICE — HEAD HOLDER JUNIOR/ADULT

## (undated) DEVICE — TUBING CLEARLINK DUO-VENT - C-FLO (48EA/CA)

## (undated) DEVICE — CLOSURE SKIN STRIP 1/2 X 4 IN - (STERI STRIP) (50/BX 4BX/CA)

## (undated) DEVICE — SUTURE 2-0 VICRYL PLUS CT-1 36 (36PK/BX)"

## (undated) DEVICE — PAD GROUNDING PRE-JELLED - (50EA/PK)

## (undated) DEVICE — SLEEVE, SEQUENTIAL CALF REG

## (undated) DEVICE — CANISTER SUCTION 3000ML MECHANICAL FILTER AUTO SHUTOFF MEDI-VAC NONSTERILE LF DISP  (40EA/CA)

## (undated) DEVICE — GLOVE BIOGEL INDICATOR SZ 7SURGICAL PF LTX - (50/BX 4BX/CA)

## (undated) DEVICE — SHEATH RO 4F 10CM (10EA/BX)

## (undated) DEVICE — ELECTRODE DUAL RETURN W/ CORD - (50/PK)

## (undated) DEVICE — SUTURE 4-0 VICRYL PLUS FS-1 - 27 INCH (36/BX)

## (undated) DEVICE — TRAY BLADDER CARE W/ 16 FR FOLEY CATHETER STATLOCK  (10/CA)

## (undated) DEVICE — STAPLER SKIN DISP - (6/BX 10BX/CA) VISISTAT

## (undated) DEVICE — CATHETER IV NON-SAFETY 18 GA X 1 1/4 (50/BX 4BX/CA)

## (undated) DEVICE — TAPE CLOTH MEDIPORE 6 INCH - (12RL/CA)

## (undated) DEVICE — PACK C-SECTION (2EA/CA)

## (undated) DEVICE — SODIUM CHL IRRIGATION 0.9% 1000ML (12EA/CA)

## (undated) DEVICE — SUTURE 0 VICRYL PLUS CT-1 - 36 INCH (36/BX)

## (undated) DEVICE — SET EXTENSION WITH 2 PORTS (48EA/CA) ***PART #2C8610 IS A SUBSTITUTE*****

## (undated) DEVICE — GLOVE BIOGEL SZ 7 SURGICAL PF LTX - (50PR/BX 4BX/CA)

## (undated) DEVICE — WATER IRRIGATION STERILE 1000ML (12EA/CA)

## (undated) DEVICE — RETRACTOR O C SECTION LRY - (5/BX)

## (undated) DEVICE — BLANKET UNDERBODY FULL ACCES - (5/CA)

## (undated) DEVICE — WATER IRRIG. STER. 1500 ML - (9/CA)

## (undated) DEVICE — SUTURE 0 36IN PDS + VIO CT-1 (36PK/BX)

## (undated) DEVICE — DETERGENT RENUZYME PLUS 10 OZ PACKET (50/BX)

## (undated) DEVICE — SUTURE 4-0 VICRYL PLUSFS-1 - 27 INCH (36/BX)

## (undated) DEVICE — TRAY SPINAL ANESTHESIA NON-SAFETY (20/CA)

## (undated) DEVICE — KIT  I.V. START (100EA/CA)

## (undated) DEVICE — TRAY SPINAL ANESTHESIA NON-SAFETY (10/CA)

## (undated) DEVICE — GLOVE BIOGEL SZ 8.5 SURGICAL PF LTX - (50PR/BX 4BX/CA)

## (undated) DEVICE — PACK ROOM TURNOVER L&D (12/CA)

## (undated) DEVICE — CHLORAPREP 26 ML APPLICATOR - ORANGE TINT(25/CA)